# Patient Record
Sex: FEMALE | Race: WHITE | NOT HISPANIC OR LATINO | Employment: UNEMPLOYED | URBAN - METROPOLITAN AREA
[De-identification: names, ages, dates, MRNs, and addresses within clinical notes are randomized per-mention and may not be internally consistent; named-entity substitution may affect disease eponyms.]

---

## 2017-04-11 ENCOUNTER — OFFICE VISIT - HEALTHEAST (OUTPATIENT)
Dept: MIDWIFE SERVICES | Facility: CLINIC | Age: 27
End: 2017-04-11

## 2017-04-11 DIAGNOSIS — Z32.00 POSSIBLE PREGNANCY: ICD-10-CM

## 2017-04-11 DIAGNOSIS — Z32.01 POSITIVE URINE PREGNANCY TEST: ICD-10-CM

## 2017-04-11 ASSESSMENT — MIFFLIN-ST. JEOR: SCORE: 1315.76

## 2017-04-13 ENCOUNTER — COMMUNICATION - HEALTHEAST (OUTPATIENT)
Dept: MIDWIFE SERVICES | Facility: CLINIC | Age: 27
End: 2017-04-13

## 2017-04-14 ENCOUNTER — COMMUNICATION - HEALTHEAST (OUTPATIENT)
Dept: MIDWIFE SERVICES | Facility: CLINIC | Age: 27
End: 2017-04-14

## 2017-04-18 ENCOUNTER — COMMUNICATION - HEALTHEAST (OUTPATIENT)
Dept: MIDWIFE SERVICES | Facility: CLINIC | Age: 27
End: 2017-04-18

## 2017-04-24 ENCOUNTER — COMMUNICATION - HEALTHEAST (OUTPATIENT)
Dept: MIDWIFE SERVICES | Facility: CLINIC | Age: 27
End: 2017-04-24

## 2017-04-24 ENCOUNTER — AMBULATORY - HEALTHEAST (OUTPATIENT)
Dept: OBGYN | Facility: CLINIC | Age: 27
End: 2017-04-24

## 2017-04-26 ENCOUNTER — COMMUNICATION - HEALTHEAST (OUTPATIENT)
Dept: ADMINISTRATIVE | Facility: CLINIC | Age: 27
End: 2017-04-26

## 2017-07-01 ENCOUNTER — OFFICE VISIT - HEALTHEAST (OUTPATIENT)
Dept: FAMILY MEDICINE | Facility: CLINIC | Age: 27
End: 2017-07-01

## 2017-07-01 DIAGNOSIS — G43.909 MIGRAINE HEADACHE: ICD-10-CM

## 2017-07-01 DIAGNOSIS — R19.7 DIARRHEA: ICD-10-CM

## 2017-09-08 ENCOUNTER — RECORDS - HEALTHEAST (OUTPATIENT)
Dept: ADMINISTRATIVE | Facility: OTHER | Age: 27
End: 2017-09-08

## 2017-09-08 ENCOUNTER — AMBULATORY - HEALTHEAST (OUTPATIENT)
Dept: ADMINISTRATIVE | Facility: REHABILITATION | Age: 27
End: 2017-09-08

## 2017-09-08 DIAGNOSIS — G43.909 MIXED MIGRAINE AND MUSCLE CONTRACTION HEADACHE: ICD-10-CM

## 2017-09-08 DIAGNOSIS — G44.209 MIXED MIGRAINE AND MUSCLE CONTRACTION HEADACHE: ICD-10-CM

## 2017-09-19 ENCOUNTER — HOSPITAL ENCOUNTER (OUTPATIENT)
Dept: MRI IMAGING | Facility: CLINIC | Age: 27
Discharge: HOME | End: 2017-09-19

## 2017-09-19 DIAGNOSIS — G43.909 MIXED MIGRAINE AND MUSCLE CONTRACTION HEADACHE: ICD-10-CM

## 2017-09-19 DIAGNOSIS — G44.209 MIXED MIGRAINE AND MUSCLE CONTRACTION HEADACHE: ICD-10-CM

## 2017-09-25 ENCOUNTER — RECORDS - HEALTHEAST (OUTPATIENT)
Dept: ADMINISTRATIVE | Facility: OTHER | Age: 27
End: 2017-09-25

## 2017-09-29 ENCOUNTER — RECORDS - HEALTHEAST (OUTPATIENT)
Dept: ADMINISTRATIVE | Facility: OTHER | Age: 27
End: 2017-09-29

## 2017-10-24 ENCOUNTER — OFFICE VISIT - HEALTHEAST (OUTPATIENT)
Dept: PHYSICAL THERAPY | Facility: REHABILITATION | Age: 27
End: 2017-10-24

## 2017-10-24 DIAGNOSIS — R29.3 POOR POSTURE: ICD-10-CM

## 2017-10-24 DIAGNOSIS — G44.209 TENSION HEADACHE: ICD-10-CM

## 2017-10-24 DIAGNOSIS — M62.81 GENERALIZED MUSCLE WEAKNESS: ICD-10-CM

## 2017-10-24 DIAGNOSIS — M54.12 CERVICAL RADICULITIS: ICD-10-CM

## 2017-10-25 ENCOUNTER — RECORDS - HEALTHEAST (OUTPATIENT)
Dept: ADMINISTRATIVE | Facility: OTHER | Age: 27
End: 2017-10-25

## 2017-11-13 ENCOUNTER — COMMUNICATION - HEALTHEAST (OUTPATIENT)
Dept: FAMILY MEDICINE | Facility: CLINIC | Age: 27
End: 2017-11-13

## 2017-11-17 ENCOUNTER — OFFICE VISIT - HEALTHEAST (OUTPATIENT)
Dept: FAMILY MEDICINE | Facility: CLINIC | Age: 27
End: 2017-11-17

## 2017-11-17 DIAGNOSIS — J02.9 SORE THROAT: ICD-10-CM

## 2017-11-17 DIAGNOSIS — J02.9 PHARYNGITIS: ICD-10-CM

## 2017-12-16 ENCOUNTER — COMMUNICATION - HEALTHEAST (OUTPATIENT)
Dept: FAMILY MEDICINE | Facility: CLINIC | Age: 27
End: 2017-12-16

## 2017-12-16 ENCOUNTER — COMMUNICATION - HEALTHEAST (OUTPATIENT)
Dept: SCHEDULING | Facility: CLINIC | Age: 27
End: 2017-12-16

## 2017-12-16 DIAGNOSIS — G43.909 MIGRAINE HEADACHE: ICD-10-CM

## 2017-12-18 ENCOUNTER — COMMUNICATION - HEALTHEAST (OUTPATIENT)
Dept: FAMILY MEDICINE | Facility: CLINIC | Age: 27
End: 2017-12-18

## 2017-12-18 ENCOUNTER — AMBULATORY - HEALTHEAST (OUTPATIENT)
Dept: FAMILY MEDICINE | Facility: CLINIC | Age: 27
End: 2017-12-18

## 2017-12-18 DIAGNOSIS — R51.9 HEADACHE: ICD-10-CM

## 2017-12-20 ENCOUNTER — COMMUNICATION - HEALTHEAST (OUTPATIENT)
Dept: FAMILY MEDICINE | Facility: CLINIC | Age: 27
End: 2017-12-20

## 2017-12-27 ENCOUNTER — COMMUNICATION - HEALTHEAST (OUTPATIENT)
Dept: HEALTH INFORMATION MANAGEMENT | Facility: CLINIC | Age: 27
End: 2017-12-27

## 2017-12-27 ENCOUNTER — COMMUNICATION - HEALTHEAST (OUTPATIENT)
Dept: TELEHEALTH | Facility: CLINIC | Age: 27
End: 2017-12-27

## 2018-01-04 ENCOUNTER — COMMUNICATION - HEALTHEAST (OUTPATIENT)
Dept: FAMILY MEDICINE | Facility: CLINIC | Age: 28
End: 2018-01-04

## 2018-01-08 ENCOUNTER — COMMUNICATION - HEALTHEAST (OUTPATIENT)
Dept: NURSING | Facility: CLINIC | Age: 28
End: 2018-01-08

## 2018-01-08 ENCOUNTER — OFFICE VISIT - HEALTHEAST (OUTPATIENT)
Dept: FAMILY MEDICINE | Facility: CLINIC | Age: 28
End: 2018-01-08

## 2018-01-08 DIAGNOSIS — G43.909 MIGRAINE: ICD-10-CM

## 2018-01-08 DIAGNOSIS — Z71.89 COUNSELING AND COORDINATION OF CARE: ICD-10-CM

## 2018-01-08 DIAGNOSIS — Z12.4 CERVICAL CANCER SCREENING: ICD-10-CM

## 2018-01-08 DIAGNOSIS — Z00.00 ROUTINE GENERAL MEDICAL EXAMINATION AT A HEALTH CARE FACILITY: ICD-10-CM

## 2018-01-08 ASSESSMENT — MIFFLIN-ST. JEOR: SCORE: 1391.73

## 2018-01-10 ENCOUNTER — RECORDS - HEALTHEAST (OUTPATIENT)
Dept: ADMINISTRATIVE | Facility: OTHER | Age: 28
End: 2018-01-10

## 2018-01-10 LAB
BKR LAB AP ABNORMAL BLEEDING: YES
BKR LAB AP BIRTH CONTROL/HORMONES: NORMAL
BKR LAB AP CERVICAL APPEARANCE: NORMAL
BKR LAB AP GYN ADEQUACY: NORMAL
BKR LAB AP GYN INTERPRETATION: NORMAL
BKR LAB AP HPV REFLEX: NORMAL
BKR LAB AP LMP: NORMAL
BKR LAB AP PATIENT STATUS: NORMAL
BKR LAB AP PREVIOUS ABNORMAL: NORMAL
BKR LAB AP PREVIOUS NORMAL: 2015
HIGH RISK?: NO
PATH REPORT.COMMENTS IMP SPEC: NORMAL
RESULT FLAG (HE HISTORICAL CONVERSION): NORMAL

## 2018-01-13 ENCOUNTER — COMMUNICATION - HEALTHEAST (OUTPATIENT)
Dept: FAMILY MEDICINE | Facility: CLINIC | Age: 28
End: 2018-01-13

## 2018-01-13 DIAGNOSIS — R51.9 HEADACHE: ICD-10-CM

## 2018-01-16 ENCOUNTER — COMMUNICATION - HEALTHEAST (OUTPATIENT)
Dept: FAMILY MEDICINE | Facility: CLINIC | Age: 28
End: 2018-01-16

## 2018-01-16 DIAGNOSIS — R51.9 HEADACHE: ICD-10-CM

## 2018-01-30 ENCOUNTER — COMMUNICATION - HEALTHEAST (OUTPATIENT)
Dept: FAMILY MEDICINE | Facility: CLINIC | Age: 28
End: 2018-01-30

## 2018-01-30 DIAGNOSIS — R51.9 HEADACHE: ICD-10-CM

## 2018-01-31 ENCOUNTER — AMBULATORY - HEALTHEAST (OUTPATIENT)
Dept: FAMILY MEDICINE | Facility: CLINIC | Age: 28
End: 2018-01-31

## 2018-01-31 ENCOUNTER — COMMUNICATION - HEALTHEAST (OUTPATIENT)
Dept: FAMILY MEDICINE | Facility: CLINIC | Age: 28
End: 2018-01-31

## 2018-01-31 ENCOUNTER — AMBULATORY - HEALTHEAST (OUTPATIENT)
Dept: NURSING | Facility: CLINIC | Age: 28
End: 2018-01-31

## 2018-01-31 DIAGNOSIS — R51.9 HEADACHE: ICD-10-CM

## 2018-02-06 ENCOUNTER — AMBULATORY - HEALTHEAST (OUTPATIENT)
Dept: FAMILY MEDICINE | Facility: CLINIC | Age: 28
End: 2018-02-06

## 2018-02-06 ENCOUNTER — COMMUNICATION - HEALTHEAST (OUTPATIENT)
Dept: SCHEDULING | Facility: CLINIC | Age: 28
End: 2018-02-06

## 2018-02-06 DIAGNOSIS — R51.9 HEADACHE: ICD-10-CM

## 2018-02-19 ENCOUNTER — COMMUNICATION - HEALTHEAST (OUTPATIENT)
Dept: FAMILY MEDICINE | Facility: CLINIC | Age: 28
End: 2018-02-19

## 2018-02-19 DIAGNOSIS — R51.9 HEADACHE: ICD-10-CM

## 2018-04-24 ENCOUNTER — COMMUNICATION - HEALTHEAST (OUTPATIENT)
Dept: FAMILY MEDICINE | Facility: CLINIC | Age: 28
End: 2018-04-24

## 2018-05-10 ENCOUNTER — COMMUNICATION - HEALTHEAST (OUTPATIENT)
Dept: FAMILY MEDICINE | Facility: CLINIC | Age: 28
End: 2018-05-10

## 2018-06-29 ENCOUNTER — COMMUNICATION - HEALTHEAST (OUTPATIENT)
Dept: FAMILY MEDICINE | Facility: CLINIC | Age: 28
End: 2018-06-29

## 2018-06-29 ENCOUNTER — OFFICE VISIT - HEALTHEAST (OUTPATIENT)
Dept: FAMILY MEDICINE | Facility: CLINIC | Age: 28
End: 2018-06-29

## 2018-06-29 DIAGNOSIS — H00.015 HORDEOLUM EXTERNUM OF LEFT LOWER EYELID: ICD-10-CM

## 2018-06-29 DIAGNOSIS — G43.909 MIGRAINE HEADACHE: ICD-10-CM

## 2018-07-10 ENCOUNTER — RECORDS - HEALTHEAST (OUTPATIENT)
Dept: ADMINISTRATIVE | Facility: OTHER | Age: 28
End: 2018-07-10

## 2018-07-12 ENCOUNTER — COMMUNICATION - HEALTHEAST (OUTPATIENT)
Dept: FAMILY MEDICINE | Facility: CLINIC | Age: 28
End: 2018-07-12

## 2018-07-17 ENCOUNTER — OFFICE VISIT - HEALTHEAST (OUTPATIENT)
Dept: FAMILY MEDICINE | Facility: CLINIC | Age: 28
End: 2018-07-17

## 2018-07-17 DIAGNOSIS — M79.601 PAIN IN BOTH UPPER EXTREMITIES: ICD-10-CM

## 2018-07-17 DIAGNOSIS — M79.602 PAIN IN BOTH UPPER EXTREMITIES: ICD-10-CM

## 2018-07-17 DIAGNOSIS — N89.8 VAGINAL DISCHARGE: ICD-10-CM

## 2018-07-17 LAB
CLUE CELLS: NORMAL
TRICHOMONAS, WET PREP: NORMAL
YEAST, WET PREP: NORMAL

## 2018-07-26 ENCOUNTER — COMMUNICATION - HEALTHEAST (OUTPATIENT)
Dept: FAMILY MEDICINE | Facility: CLINIC | Age: 28
End: 2018-07-26

## 2018-07-26 DIAGNOSIS — K08.89 TOOTH PAIN: ICD-10-CM

## 2018-08-06 ENCOUNTER — COMMUNICATION - HEALTHEAST (OUTPATIENT)
Dept: FAMILY MEDICINE | Facility: CLINIC | Age: 28
End: 2018-08-06

## 2018-08-06 DIAGNOSIS — K08.89 TOOTH PAIN: ICD-10-CM

## 2018-08-10 ENCOUNTER — COMMUNICATION - HEALTHEAST (OUTPATIENT)
Dept: FAMILY MEDICINE | Facility: CLINIC | Age: 28
End: 2018-08-10

## 2018-09-04 ENCOUNTER — COMMUNICATION - HEALTHEAST (OUTPATIENT)
Dept: FAMILY MEDICINE | Facility: CLINIC | Age: 28
End: 2018-09-04

## 2018-09-05 ENCOUNTER — OFFICE VISIT - HEALTHEAST (OUTPATIENT)
Dept: FAMILY MEDICINE | Facility: CLINIC | Age: 28
End: 2018-09-05

## 2018-09-05 ENCOUNTER — COMMUNICATION - HEALTHEAST (OUTPATIENT)
Dept: FAMILY MEDICINE | Facility: CLINIC | Age: 28
End: 2018-09-05

## 2018-09-05 DIAGNOSIS — R51.9 HEADACHE: ICD-10-CM

## 2018-09-05 DIAGNOSIS — G43.719: ICD-10-CM

## 2018-09-07 ENCOUNTER — COMMUNICATION - HEALTHEAST (OUTPATIENT)
Dept: FAMILY MEDICINE | Facility: CLINIC | Age: 28
End: 2018-09-07

## 2018-09-17 ENCOUNTER — AMBULATORY - HEALTHEAST (OUTPATIENT)
Dept: FAMILY MEDICINE | Facility: CLINIC | Age: 28
End: 2018-09-17

## 2018-10-18 ENCOUNTER — COMMUNICATION - HEALTHEAST (OUTPATIENT)
Dept: ADMINISTRATIVE | Facility: CLINIC | Age: 28
End: 2018-10-18

## 2018-10-31 ENCOUNTER — COMMUNICATION - HEALTHEAST (OUTPATIENT)
Dept: FAMILY MEDICINE | Facility: CLINIC | Age: 28
End: 2018-10-31

## 2018-11-29 ENCOUNTER — COMMUNICATION - HEALTHEAST (OUTPATIENT)
Dept: FAMILY MEDICINE | Facility: CLINIC | Age: 28
End: 2018-11-29

## 2018-12-28 ENCOUNTER — COMMUNICATION - HEALTHEAST (OUTPATIENT)
Dept: FAMILY MEDICINE | Facility: CLINIC | Age: 28
End: 2018-12-28

## 2018-12-28 DIAGNOSIS — F43.10 PTSD (POST-TRAUMATIC STRESS DISORDER): ICD-10-CM

## 2019-01-25 ENCOUNTER — OFFICE VISIT - HEALTHEAST (OUTPATIENT)
Dept: FAMILY MEDICINE | Facility: CLINIC | Age: 29
End: 2019-01-25

## 2019-01-25 DIAGNOSIS — J02.9 PHARYNGITIS, UNSPECIFIED ETIOLOGY: ICD-10-CM

## 2019-01-25 LAB — DEPRECATED S PYO AG THROAT QL EIA: NORMAL

## 2019-01-26 LAB — GROUP A STREP BY PCR: NORMAL

## 2019-02-11 ENCOUNTER — COMMUNICATION - HEALTHEAST (OUTPATIENT)
Dept: FAMILY MEDICINE | Facility: CLINIC | Age: 29
End: 2019-02-11

## 2019-02-11 DIAGNOSIS — R51.9 HEADACHE: ICD-10-CM

## 2019-03-19 ENCOUNTER — COMMUNICATION - HEALTHEAST (OUTPATIENT)
Dept: SCHEDULING | Facility: CLINIC | Age: 29
End: 2019-03-19

## 2019-05-17 ENCOUNTER — COMMUNICATION - HEALTHEAST (OUTPATIENT)
Dept: FAMILY MEDICINE | Facility: CLINIC | Age: 29
End: 2019-05-17

## 2019-05-17 DIAGNOSIS — R51.9 HEADACHE: ICD-10-CM

## 2019-07-18 ENCOUNTER — COMMUNICATION - HEALTHEAST (OUTPATIENT)
Dept: FAMILY MEDICINE | Facility: CLINIC | Age: 29
End: 2019-07-18

## 2019-07-18 DIAGNOSIS — N61.0 MASTITIS: ICD-10-CM

## 2019-07-22 ENCOUNTER — COMMUNICATION - HEALTHEAST (OUTPATIENT)
Dept: SCHEDULING | Facility: CLINIC | Age: 29
End: 2019-07-22

## 2019-08-13 ENCOUNTER — COMMUNICATION - HEALTHEAST (OUTPATIENT)
Dept: SCHEDULING | Facility: CLINIC | Age: 29
End: 2019-08-13

## 2019-08-13 DIAGNOSIS — F41.9 ANXIETY: ICD-10-CM

## 2019-08-27 ENCOUNTER — OFFICE VISIT - HEALTHEAST (OUTPATIENT)
Dept: FAMILY MEDICINE | Facility: CLINIC | Age: 29
End: 2019-08-27

## 2019-08-27 DIAGNOSIS — G43.719 INTRACTABLE CHRONIC MIGRAINE WITHOUT AURA AND WITHOUT STATUS MIGRAINOSUS: ICD-10-CM

## 2019-08-27 DIAGNOSIS — F33.0 MILD EPISODE OF RECURRENT MAJOR DEPRESSIVE DISORDER (H): ICD-10-CM

## 2019-08-27 DIAGNOSIS — F41.9 ANXIETY: ICD-10-CM

## 2019-08-28 ENCOUNTER — COMMUNICATION - HEALTHEAST (OUTPATIENT)
Dept: FAMILY MEDICINE | Facility: CLINIC | Age: 29
End: 2019-08-28

## 2019-09-12 ENCOUNTER — COMMUNICATION - HEALTHEAST (OUTPATIENT)
Dept: FAMILY MEDICINE | Facility: CLINIC | Age: 29
End: 2019-09-12

## 2019-09-12 DIAGNOSIS — R51.9 HEADACHE: ICD-10-CM

## 2019-10-13 ENCOUNTER — COMMUNICATION - HEALTHEAST (OUTPATIENT)
Dept: FAMILY MEDICINE | Facility: CLINIC | Age: 29
End: 2019-10-13

## 2019-10-13 DIAGNOSIS — Z00.00 WELLNESS EXAMINATION: ICD-10-CM

## 2019-11-29 ENCOUNTER — COMMUNICATION - HEALTHEAST (OUTPATIENT)
Dept: FAMILY MEDICINE | Facility: CLINIC | Age: 29
End: 2019-11-29

## 2019-11-29 DIAGNOSIS — Z78.9 USES BIRTH CONTROL: ICD-10-CM

## 2019-12-12 ENCOUNTER — COMMUNICATION - HEALTHEAST (OUTPATIENT)
Dept: FAMILY MEDICINE | Facility: CLINIC | Age: 29
End: 2019-12-12

## 2019-12-12 DIAGNOSIS — J10.1 INFLUENZA B: ICD-10-CM

## 2020-01-07 ENCOUNTER — COMMUNICATION - HEALTHEAST (OUTPATIENT)
Dept: FAMILY MEDICINE | Facility: CLINIC | Age: 30
End: 2020-01-07

## 2020-01-07 DIAGNOSIS — Z00.00 WELLNESS EXAMINATION: ICD-10-CM

## 2020-01-08 RX ORDER — SWAB
SWAB, NON-MEDICATED MISCELLANEOUS
Qty: 90 TABLET | Refills: 0 | Status: SHIPPED | OUTPATIENT
Start: 2020-01-08 | End: 2021-12-13

## 2020-02-06 ENCOUNTER — COMMUNICATION - HEALTHEAST (OUTPATIENT)
Dept: SCHEDULING | Facility: CLINIC | Age: 30
End: 2020-02-06

## 2020-02-06 ENCOUNTER — OFFICE VISIT - HEALTHEAST (OUTPATIENT)
Dept: FAMILY MEDICINE | Facility: CLINIC | Age: 30
End: 2020-02-06

## 2020-02-06 DIAGNOSIS — J02.9 SORETHROAT: ICD-10-CM

## 2020-02-06 DIAGNOSIS — R50.9 FEVER: ICD-10-CM

## 2020-02-06 DIAGNOSIS — J10.1 INFLUENZA A: ICD-10-CM

## 2020-02-06 LAB
DEPRECATED S PYO AG THROAT QL EIA: NORMAL
FLUAV AG SPEC QL IA: ABNORMAL
FLUBV AG SPEC QL IA: ABNORMAL

## 2020-02-07 LAB — GROUP A STREP BY PCR: NORMAL

## 2020-08-07 ENCOUNTER — COMMUNICATION - HEALTHEAST (OUTPATIENT)
Dept: FAMILY MEDICINE | Facility: CLINIC | Age: 30
End: 2020-08-07

## 2020-08-07 DIAGNOSIS — Z78.9 USES BIRTH CONTROL: ICD-10-CM

## 2020-08-07 RX ORDER — LEVONORGESTREL AND ETHINYL ESTRADIOL 0.15-0.03
KIT ORAL
Qty: 84 TABLET | Refills: 2 | Status: SHIPPED | OUTPATIENT
Start: 2020-08-07 | End: 2021-12-13

## 2020-08-17 ENCOUNTER — OFFICE VISIT - HEALTHEAST (OUTPATIENT)
Dept: FAMILY MEDICINE | Facility: CLINIC | Age: 30
End: 2020-08-17

## 2020-08-17 DIAGNOSIS — R51.9 HEADACHE: ICD-10-CM

## 2020-08-17 DIAGNOSIS — F41.9 ANXIETY: ICD-10-CM

## 2020-08-17 DIAGNOSIS — G43.909 MIGRAINE WITHOUT STATUS MIGRAINOSUS, NOT INTRACTABLE, UNSPECIFIED MIGRAINE TYPE: ICD-10-CM

## 2020-08-17 DIAGNOSIS — Z00.00 ROUTINE GENERAL MEDICAL EXAMINATION AT A HEALTH CARE FACILITY: ICD-10-CM

## 2020-08-17 LAB — HCG UR QL: NEGATIVE

## 2020-08-17 ASSESSMENT — ANXIETY QUESTIONNAIRES
7. FEELING AFRAID AS IF SOMETHING AWFUL MIGHT HAPPEN: SEVERAL DAYS
IF YOU CHECKED OFF ANY PROBLEMS ON THIS QUESTIONNAIRE, HOW DIFFICULT HAVE THESE PROBLEMS MADE IT FOR YOU TO DO YOUR WORK, TAKE CARE OF THINGS AT HOME, OR GET ALONG WITH OTHER PEOPLE: SOMEWHAT DIFFICULT
5. BEING SO RESTLESS THAT IT IS HARD TO SIT STILL: SEVERAL DAYS
6. BECOMING EASILY ANNOYED OR IRRITABLE: SEVERAL DAYS
4. TROUBLE RELAXING: SEVERAL DAYS
1. FEELING NERVOUS, ANXIOUS, OR ON EDGE: SEVERAL DAYS
3. WORRYING TOO MUCH ABOUT DIFFERENT THINGS: SEVERAL DAYS
2. NOT BEING ABLE TO STOP OR CONTROL WORRYING: SEVERAL DAYS
GAD7 TOTAL SCORE: 7

## 2020-08-17 ASSESSMENT — MIFFLIN-ST. JEOR: SCORE: 1371.32

## 2020-08-17 ASSESSMENT — PATIENT HEALTH QUESTIONNAIRE - PHQ9: SUM OF ALL RESPONSES TO PHQ QUESTIONS 1-9: 1

## 2020-09-15 ENCOUNTER — COMMUNICATION - HEALTHEAST (OUTPATIENT)
Dept: FAMILY MEDICINE | Facility: CLINIC | Age: 30
End: 2020-09-15

## 2020-09-15 DIAGNOSIS — F41.9 ANXIETY: ICD-10-CM

## 2020-09-16 RX ORDER — ESCITALOPRAM OXALATE 20 MG/1
TABLET ORAL
Qty: 90 TABLET | Refills: 3 | Status: SHIPPED | OUTPATIENT
Start: 2020-09-16 | End: 2021-12-13

## 2020-09-24 ENCOUNTER — COMMUNICATION - HEALTHEAST (OUTPATIENT)
Dept: FAMILY MEDICINE | Facility: CLINIC | Age: 30
End: 2020-09-24

## 2020-09-28 ENCOUNTER — COMMUNICATION - HEALTHEAST (OUTPATIENT)
Dept: FAMILY MEDICINE | Facility: CLINIC | Age: 30
End: 2020-09-28

## 2020-11-18 ENCOUNTER — OFFICE VISIT - HEALTHEAST (OUTPATIENT)
Dept: FAMILY MEDICINE | Facility: CLINIC | Age: 30
End: 2020-11-18

## 2020-11-18 DIAGNOSIS — F33.0 MILD EPISODE OF RECURRENT MAJOR DEPRESSIVE DISORDER (H): ICD-10-CM

## 2020-11-18 DIAGNOSIS — F41.9 ANXIETY: ICD-10-CM

## 2020-11-18 RX ORDER — HYDROXYZINE PAMOATE 50 MG/1
50 CAPSULE ORAL 3 TIMES DAILY PRN
Qty: 30 CAPSULE | Refills: 0 | Status: SHIPPED | OUTPATIENT
Start: 2020-11-18 | End: 2021-12-13

## 2020-11-18 ASSESSMENT — ANXIETY QUESTIONNAIRES
5. BEING SO RESTLESS THAT IT IS HARD TO SIT STILL: NEARLY EVERY DAY
GAD7 TOTAL SCORE: 21
1. FEELING NERVOUS, ANXIOUS, OR ON EDGE: NEARLY EVERY DAY
7. FEELING AFRAID AS IF SOMETHING AWFUL MIGHT HAPPEN: NEARLY EVERY DAY
IF YOU CHECKED OFF ANY PROBLEMS ON THIS QUESTIONNAIRE, HOW DIFFICULT HAVE THESE PROBLEMS MADE IT FOR YOU TO DO YOUR WORK, TAKE CARE OF THINGS AT HOME, OR GET ALONG WITH OTHER PEOPLE: EXTREMELY DIFFICULT
4. TROUBLE RELAXING: NEARLY EVERY DAY
2. NOT BEING ABLE TO STOP OR CONTROL WORRYING: NEARLY EVERY DAY
3. WORRYING TOO MUCH ABOUT DIFFERENT THINGS: NEARLY EVERY DAY
6. BECOMING EASILY ANNOYED OR IRRITABLE: NEARLY EVERY DAY

## 2020-11-18 ASSESSMENT — PATIENT HEALTH QUESTIONNAIRE - PHQ9: SUM OF ALL RESPONSES TO PHQ QUESTIONS 1-9: 17

## 2021-01-07 ENCOUNTER — OFFICE VISIT - HEALTHEAST (OUTPATIENT)
Dept: FAMILY MEDICINE | Facility: CLINIC | Age: 31
End: 2021-01-07

## 2021-01-07 DIAGNOSIS — M79.10 MYALGIA: ICD-10-CM

## 2021-01-07 DIAGNOSIS — G43.909 MIGRAINE WITHOUT STATUS MIGRAINOSUS, NOT INTRACTABLE, UNSPECIFIED MIGRAINE TYPE: ICD-10-CM

## 2021-01-07 DIAGNOSIS — R00.0 TACHYCARDIA: ICD-10-CM

## 2021-01-07 LAB
ALBUMIN SERPL-MCNC: 3.9 G/DL (ref 3.5–5)
ALP SERPL-CCNC: 46 U/L (ref 45–120)
ALT SERPL W P-5'-P-CCNC: 11 U/L (ref 0–45)
ANION GAP SERPL CALCULATED.3IONS-SCNC: 10 MMOL/L (ref 5–18)
AST SERPL W P-5'-P-CCNC: 18 U/L (ref 0–40)
ATRIAL RATE - MUSE: 62 BPM
BILIRUB SERPL-MCNC: 0.5 MG/DL (ref 0–1)
BUN SERPL-MCNC: 17 MG/DL (ref 8–22)
CALCIUM SERPL-MCNC: 8.8 MG/DL (ref 8.5–10.5)
CHLORIDE BLD-SCNC: 106 MMOL/L (ref 98–107)
CO2 SERPL-SCNC: 25 MMOL/L (ref 22–31)
CREAT SERPL-MCNC: 0.83 MG/DL (ref 0.6–1.1)
DIASTOLIC BLOOD PRESSURE - MUSE: NORMAL
ERYTHROCYTE [DISTWIDTH] IN BLOOD BY AUTOMATED COUNT: 11.3 % (ref 11–14.5)
GFR SERPL CREATININE-BSD FRML MDRD: >60 ML/MIN/1.73M2
GLUCOSE BLD-MCNC: 87 MG/DL (ref 70–125)
HCT VFR BLD AUTO: 39.4 % (ref 35–47)
HGB BLD-MCNC: 13.1 G/DL (ref 12–16)
INTERPRETATION ECG - MUSE: NORMAL
MAGNESIUM SERPL-MCNC: 1.9 MG/DL (ref 1.8–2.6)
MCH RBC QN AUTO: 32 PG (ref 27–34)
MCHC RBC AUTO-ENTMCNC: 33.2 G/DL (ref 32–36)
MCV RBC AUTO: 96 FL (ref 80–100)
P AXIS - MUSE: 60 DEGREES
PLATELET # BLD AUTO: 259 THOU/UL (ref 140–440)
PMV BLD AUTO: 7 FL (ref 7–10)
POTASSIUM BLD-SCNC: 4.2 MMOL/L (ref 3.5–5)
PR INTERVAL - MUSE: 184 MS
PROT SERPL-MCNC: 6.9 G/DL (ref 6–8)
QRS DURATION - MUSE: 92 MS
QT - MUSE: 412 MS
QTC - MUSE: 418 MS
R AXIS - MUSE: 41 DEGREES
RBC # BLD AUTO: 4.09 MILL/UL (ref 3.8–5.4)
SODIUM SERPL-SCNC: 141 MMOL/L (ref 136–145)
SYSTOLIC BLOOD PRESSURE - MUSE: NORMAL
T AXIS - MUSE: 33 DEGREES
TSH SERPL DL<=0.005 MIU/L-ACNC: 0.8 UIU/ML (ref 0.3–5)
VENTRICULAR RATE- MUSE: 62 BPM
VIT B12 SERPL-MCNC: 453 PG/ML (ref 213–816)
WBC: 9.4 THOU/UL (ref 4–11)

## 2021-01-07 RX ORDER — BUTALBITAL, ACETAMINOPHEN AND CAFFEINE 50; 325; 40 MG/1; MG/1; MG/1
1-2 TABLET ORAL EVERY 6 HOURS PRN
Qty: 20 TABLET | Refills: 0 | Status: SHIPPED | OUTPATIENT
Start: 2021-01-07 | End: 2021-12-13

## 2021-01-07 RX ORDER — RIZATRIPTAN BENZOATE 5 MG/1
5 TABLET, ORALLY DISINTEGRATING ORAL PRN
Qty: 30 TABLET | Refills: 0 | Status: SHIPPED | OUTPATIENT
Start: 2021-01-07 | End: 2021-12-13

## 2021-01-08 LAB
25(OH)D3 SERPL-MCNC: 26.3 NG/ML (ref 30–80)
25(OH)D3 SERPL-MCNC: 26.3 NG/ML (ref 30–80)
B BURGDOR IGG+IGM SER QL: 0.19 INDEX VALUE

## 2021-02-03 ENCOUNTER — AMBULATORY - HEALTHEAST (OUTPATIENT)
Dept: FAMILY MEDICINE | Facility: CLINIC | Age: 31
End: 2021-02-03

## 2021-02-03 DIAGNOSIS — L70.0 ACNE VULGARIS: ICD-10-CM

## 2021-02-03 RX ORDER — SPIRONOLACTONE 100 MG/1
100 TABLET, FILM COATED ORAL DAILY
Qty: 30 TABLET | Refills: 11 | Status: SHIPPED | OUTPATIENT
Start: 2021-02-03 | End: 2021-12-13

## 2021-02-03 RX ORDER — TRETINOIN 0.25 MG/G
CREAM TOPICAL DAILY
Qty: 45 G | Refills: 0 | Status: SHIPPED | OUTPATIENT
Start: 2021-02-03 | End: 2021-12-13

## 2021-02-16 ENCOUNTER — COMMUNICATION - HEALTHEAST (OUTPATIENT)
Dept: ADMINISTRATIVE | Facility: CLINIC | Age: 31
End: 2021-02-16

## 2021-02-16 DIAGNOSIS — F33.0 MILD EPISODE OF RECURRENT MAJOR DEPRESSIVE DISORDER (H): ICD-10-CM

## 2021-02-16 DIAGNOSIS — F41.9 ANXIETY: ICD-10-CM

## 2021-02-16 RX ORDER — VENLAFAXINE HYDROCHLORIDE 37.5 MG/1
37.5 CAPSULE, EXTENDED RELEASE ORAL DAILY
Qty: 90 CAPSULE | Refills: 0 | Status: SHIPPED | OUTPATIENT
Start: 2021-02-16 | End: 2021-12-13

## 2021-04-22 ENCOUNTER — RECORDS - HEALTHEAST (OUTPATIENT)
Dept: ADMINISTRATIVE | Facility: OTHER | Age: 31
End: 2021-04-22

## 2021-05-26 ASSESSMENT — PATIENT HEALTH QUESTIONNAIRE - PHQ9: SUM OF ALL RESPONSES TO PHQ QUESTIONS 1-9: 17

## 2021-05-27 ASSESSMENT — PATIENT HEALTH QUESTIONNAIRE - PHQ9: SUM OF ALL RESPONSES TO PHQ QUESTIONS 1-9: 1

## 2021-05-28 ASSESSMENT — ANXIETY QUESTIONNAIRES
GAD7 TOTAL SCORE: 7
GAD7 TOTAL SCORE: 21

## 2021-05-28 NOTE — TELEPHONE ENCOUNTER
Controlled Substance Refill Request  Medication:   Requested Prescriptions     Pending Prescriptions Disp Refills     traMADol (ULTRAM) 50 mg tablet [Pharmacy Med Name: TRAMADOL 50MG TABLETS] 30 tablet 0     Sig: TAKE 1 TABLET BY MOUTH AS NEEDED FOR HEADACHE. NO MORE THAN 10 TABLETS PER MONTH     Date Last Fill: 2/13/19  Pharmacy: [facility]    Submit electronically to pharmacy    Controlled Substance Agreement on File:   Encounter-Level CSA Scan Date:    There are no encounter-level csa scan date.         Last office visit: 9/5/2018 Alyssia Scott MD

## 2021-05-28 NOTE — TELEPHONE ENCOUNTER
Last filled per  4-17-19. Last medication check 9-15-18. No current CSA on file. No up coming appointment made.

## 2021-05-30 VITALS — BODY MASS INDEX: 19.93 KG/M2 | WEIGHT: 127 LBS | HEIGHT: 67 IN

## 2021-05-30 NOTE — TELEPHONE ENCOUNTER
Left message to call back for: Lauren-2nd attempt  Information to relay to patient:      rx send for keflex 1 tab three times per day  For 10 dy. Please Follow up in clinic if not better in next 3 days , continue pumping milk or breast feeding      Mulu Gómez MD 7/18/2019 10:12 PM

## 2021-05-30 NOTE — TELEPHONE ENCOUNTER
RN Triage:   Thursday she had a blister on the right breast treated for mastitis on the right breast. On cephalexin 500 mg three times a day. NO fever, just aches and chills which have gotten better since starting the antibiotics. Patient has same amount of pain and redness. Breast is red and feels the blister has turned into a lump that is the size of a marble. Patient wanted an appointment for tomorrow. She was warm transferred to scheduling. Home care suggestions reviewed per RN protocol.     Marquita Faria RN, BSN [facility] Triage Nurse        Reason for Disposition    Taking antibiotic < 72 hours (3 days) and infected wound doesn't look better    Protocols used: WOUND INFECTION-A-OH

## 2021-05-30 NOTE — TELEPHONE ENCOUNTER
rx send for keflex 1 tab three times per day  For 10 dy. Please Follow up in clinic if not better in next 3 days , continue pumping milk or breast feeding     Mulu Gómez MD 7/18/2019 10:12 PM

## 2021-05-30 NOTE — TELEPHONE ENCOUNTER
Patient Returning Call  Reason for call: Patient calling back  Information relayed to patient:  Below message relayed to patient, she reports still in a lot of pain, feels a lump in her breast, caller transferred to [facility] triage nurse.  Patient has additional questions:  Yes  If YES, what are your questions/concerns:    Okay to leave a detailed message?:  Yes

## 2021-05-30 NOTE — TELEPHONE ENCOUNTER
The patient is weaning and the right side is painful.    Rates her pain at an 8 out of 10    The pain started yesterday    Is still breastfeeding but has returned to work and is in the processes of weaning.    Today the breast is pulsating.    The spot is red and tender.    The nipple and the whole breast is tender    It started with a clogged duct. Was hot packing, and pumping and breastfeeding however with the heat and the weaning it has now become inflamed.    Pt is requesting an antibiotic.    She doesn't believe she has a fever.    Baby is fussy and teething    Please advise     Kaylah Goddard RN  [facility] Medication Refill and Triage Nurse  7/18/2019  10:10 AM

## 2021-05-31 VITALS — BODY MASS INDEX: 23.55 KG/M2 | WEIGHT: 148.1 LBS

## 2021-05-31 VITALS — HEIGHT: 66 IN | WEIGHT: 145.5 LBS | BODY MASS INDEX: 23.38 KG/M2

## 2021-05-31 VITALS — BODY MASS INDEX: 27.45 KG/M2 | WEIGHT: 170.1 LBS

## 2021-05-31 NOTE — TELEPHONE ENCOUNTER
Per Dr. Hay - Prior Authorization started for Botox.   [facility] form was completed and faxed 8/28. Copy of form will be sent to scan. Original will be given to Dr. Hay's assistant.   Teodora Urbina LPN

## 2021-05-31 NOTE — TELEPHONE ENCOUNTER
"  Refill of escitalopram      Was referred to triage as she had mentioned to the initial staff that she was experiencing \"palpitaitons\"      Last dose of this was 3 days ago      > \"These are not new\"  - she has a h/o of these - will last 1-2 seconds   > No shortness of breath   > No CP   > No dizziness / lightheadness       A/P:   > Screened per protocol - negative for concerning sx per history reported     > Call back if new pattern or worsening in some way       Will process refill request as per SOP as well        Wicho Triplett, RN   Triage and Medication Refills    Reason for Disposition    Intermittent mild chest pain lasting a few seconds each time    Protocols used: CHEST PAIN-A-OH      "

## 2021-05-31 NOTE — PROGRESS NOTES
ASSESSMENT/PLAN:  Mild episode of recurrent major depressive disorder (H), anxiety  PHQ9=1, GAD7=2  Stable   Refilled  -     escitalopram oxalate (LEXAPRO) 20 MG tablet; Take 1 tablet (20 mg total) by mouth daily.    Intractable chronic migraine without aura and without status migrainosus  Stop daily excedrine migraine  Chronic 10 year history  Location:  Frontal, temporal, occipital  Headache days/month:  25+ days with headaches  Headache duration hrs/day:  12+ hours  Over the past 3 months headaches have become more often and has been taking daily excedrine migraine  Disability due to headaches: often missed /canceled plans because of headaches.  She has gone to the emergency department numerous times because of her headaches.  Medications previously tried: verapamil, Amitriptyline, topiramate, propanolol, gabapentin as preventative treatment without success.  She has tried Zofran, sumatriptan, Rizatriptan, ibuprofen, acetaminophen, tramadol, Fioricet as acute abortive treatments.  She has also tried [facility] and has not noted any difference     Plan:  start prior authorization for BOTOX.  She had an MRI of the brain that was done in 2017 which showed Chiari I malformation with tonsils projecting 7.6 mm over the foramen magnum, as opposed to an MRI from 2015 which showed the Chiari malformation with tonsils 5 mm before the level of the foramen magnum.  She will follow-up with [facility].  I strongly support the use of botulinum toxin injection for the convention of chronic migraine for this patient.     CHIEF COMPLAINT:  Chief Complaint   Patient presents with     Medication Management     citalopram        HISTORY OF PRESENT ILLNESS:  Lauren is a 29 y.o. female presenting to the clinic today for medication management. The patient report that she recently moved houses 4 days ago and acknowledge that she feels a little bit sad about the move, but overall feels  okay. The patient acknowledge that she has not been able to see her psychiatrist and needs her citalopram medication renewed. She confirms that she has ongoing constant migraines.  She denies to take Escitalopram oxalate, 10 mg, and tramadol 50 mg. The patient states that she is no longer on probabtion    Chronic 10 year history  Location:  Frontal, temporal, occipital  Headache days/month:  25+ days with headaches  Headache duration hrs/day:  12+ hours  Over the past 3 months headaches have become more often  Disability due to headaches: often missed /canceled plans because of headaches.  She has gone to the emergency department numerous times because of her headaches.  Medications previously tried: verapamil, Amitriptyline, topiramate, propanolol, gabapentin as preventative treatment without success.  She has tried Zofran, sumatriptan, Rizatriptan, ibuprofen, acetaminophen, tramadol, Fioricet as acute abortive treatments.  She has also tried physical therapy [facility] and has not noted any difference     [facility].  There was a note for review.  It was mentioned that the patient had an MRI of the brain that showed Chiari I malformation with tonsils projecting at 7.6 mm over the foramen magnum.  This was also compared with an MRI from 2015 which showed Chiari malformation with tonsils 5 mm before the level of the foramen magnum.    REVIEW OF SYSTEMS:   Positive: migraines.  All other systems are negative.    PFSH:  Substance abuse  Alcoholic     TOBACCO USE:  Social History     Tobacco Use   Smoking Status Former Smoker     Years: 5.00     Types: Cigarettes     Last attempt to quit: 2012     Years since quittin.6   Smokeless Tobacco Never Used   Tobacco Comment    e-cig       VITALS:  Vitals:    19 1456   BP: 106/70   Pulse: 64   Resp: 16   Weight: 124 lb 8 oz (56.5 kg)     Wt Readings from Last 3 Encounters:   19 124 lb  8 oz (56.5 kg)   01/25/19 120 lb (54.4 kg)   09/05/18 123 lb 9.6 oz (56.1 kg)       PHYSICAL EXAM:  Constitutional: Patient is oriented to person, place, and time. Patient appears well-developed and well-nourished. No distress.   Head: Normocephalic and atraumatic.   Right Ear: External ear normal.   Left Ear: External ear normal.   Nose: Nose normal.   Eyes: Conjunctivae and EOM are normal. Right eye exhibits no discharge. Left eye exhibits no discharge. No scleral icterus.   Neurological: Patient is alert and oriented to person, place, and time. No cranial nerve deficit. Coordination normal.   Skin: No rash noted. Patient is not diaphoretic. No erythema. No pallor.  The patient has good eye contact.  No psychomotor retardation or stereotypical behaviors.  Speech was regular rate, regular rhythm, adequate responses.  Mood was stable and affect was congruent mood.  No suicidal or homicidal intent.  No hallucination.     Results for orders placed or performed in visit on 01/25/19   Rapid Strep A Screen-Throat   Result Value Ref Range    Rapid Strep A Antigen No Group A Strep detected, presumptive negative No Group A Strep detected, presumptive negative   Group A Strep, RNA Direct Detection, Throat   Result Value Ref Range    Group A Strep by PCR No Group A Strep rRNA detected No Group A Strep rRNA detected       QUALITY MEASURES:      ADDITIONAL HISTORY SUMMARIZED (2): None.  DECISION TO OBTAIN EXTRA INFORMATION (1): None.   RADIOLOGY TESTS (1): None.  LABS (1): None.  MEDICINE TESTS (1): None.  INDEPENDENT REVIEW (2 each): None.     Total data points: 0    The visit lasted a total of 16 minutes face to face with the patient. Over 50% of the time was spent counseling and educating the patient about medication renewal.    IAngely, am scribing for and in the presence of, Dr. Hay.    I, Dr. Hay, personally performed the services described in this documentation, as scribed by Angely Salazar in my presence, and  it is both accurate and complete.    MEDICATIONS:  Current Outpatient Medications   Medication Sig Dispense Refill     escitalopram oxalate (LEXAPRO) 20 MG tablet Take 1 tablet (20 mg total) by mouth daily. 90 tablet 3     levonorgestrel-ethinyl estradiol (LEVORA-28) 0.15-0.03 mg per tablet Take 1 tablet by mouth daily. 84 tablet 3     prenat.vits,gabriel,min-iron-folic (PRENATAL VITAMIN) Tab Take 1 tablet by mouth daily. 90 each 3     butalbital-acetaminophen-caffeine (FIORICET, ESGIC) -40 mg per tablet Take 1-2 tablets by mouth every 6 (six) hours as needed for headaches. 20 tablet 0     cholecalciferol, vitamin D3, 1,000 unit capsule        SUMAtriptan (IMITREX) 50 MG tablet Take 1 tablet (50 mg total) by mouth once as needed for migraine. Max 200mg/24 hr 30 tablet 0     traMADol (ULTRAM) 50 mg tablet TAKE 1 TABLET BY MOUTH AS NEEDED FOR HEADACHE. NO MORE THAN 10 TABLETS PER MONTH 30 tablet 0     No current facility-administered medications for this visit.

## 2021-05-31 NOTE — TELEPHONE ENCOUNTER
RN cannot approve Refill Request    RN can NOT refill this medication historical medication requested.       Last office visit: Visit date not found Last Physical: Visit date not found Last MTM visit: 8/23/2016 Bonny Oliveira, PharmD Last visit same specialty: Visit date not found.  Next visit within 3 mo: Visit date not found  Next physical within 3 mo: Visit date not found      Ross Triplett, [facility] Connection Triage/Med Refill 8/13/2019    Requested Prescriptions   Pending Prescriptions Disp Refills     escitalopram oxalate (LEXAPRO) 10 MG tablet  0     Sig: Take 1 tablet (10 mg total) by mouth daily.       There is no refill protocol information for this order

## 2021-06-01 VITALS — BODY MASS INDEX: 20.18 KG/M2 | WEIGHT: 125 LBS

## 2021-06-01 VITALS — WEIGHT: 123.13 LBS | BODY MASS INDEX: 19.87 KG/M2

## 2021-06-01 NOTE — TELEPHONE ENCOUNTER
I contacted [employer] to check on status of Prior Auth. Per [employer]'s the PA was denied and a letter was faxed to us on 8/28. Letter will be re-faxed to the  fax machine.   Teodora Urbina, [facility]

## 2021-06-01 NOTE — TELEPHONE ENCOUNTER
RN cannot approve Refill Request    RN can NOT refill this medication PCP to clarify quantity and instructions. Last office visit: 8/27/2019 Alyssia Scott MD Last Physical: 1/8/2018 Last MTM visit: Visit date not found Last visit same specialty: 8/27/2019 Alyssia Scott MD.  Next visit within 3 mo: Visit date not found  Next physical within 3 mo: Visit date not found      Sabrina Ovalle Connection Triage/Med Refill 9/12/2019    Requested Prescriptions   Pending Prescriptions Disp Refills     SUMAtriptan (IMITREX) 50 MG tablet [Pharmacy Med Name: SUMATRIPTAN 50MG TABLETS] 30 tablet 0     Sig: TAKE 1 TABLET(50 MG) BY MOUTH 1 TIME AS NEEDED FOR MIGRAINE.  MG/ 24 HOUR       Triptans Refill Protocol Passed - 9/12/2019 12:15 PM        Passed - PCP or prescribing provider visit in past 12 months       Last office visit with prescriber/PCP: 8/27/2019 Alyssia Scott MD OR same dept: 8/27/2019 Alyssia Scott MD OR same specialty: 8/27/2019 Alyssia Scott MD  Last physical: 1/8/2018 Last MTM visit: Visit date not found   Next visit within 3 mo: Visit date not found  Next physical within 3 mo: Visit date not found  Prescriber OR PCP: Alyssia Beard MD  Last diagnosis associated with med order: 1. Headache  - SUMAtriptan (IMITREX) 50 MG tablet [Pharmacy Med Name: SUMATRIPTAN 50MG TABLETS]; TAKE 1 TABLET(50 MG) BY MOUTH 1 TIME AS NEEDED FOR MIGRAINE.  MG/ 24 HOUR  Dispense: 30 tablet; Refill: 0    If protocol passes may refill for 12 months if within 3 months of last provider visit (or a total of 15 months).

## 2021-06-02 VITALS — WEIGHT: 123.6 LBS | BODY MASS INDEX: 19.95 KG/M2

## 2021-06-02 VITALS — WEIGHT: 120 LBS | BODY MASS INDEX: 19.37 KG/M2

## 2021-06-02 NOTE — TELEPHONE ENCOUNTER
RN cannot approve Refill Request    RN can NOT refill this medication med is not covered by policy/route to provider. Last office visit: 8/27/2019 Alyssia Scott MD Last Physical: 1/8/2018 Last MTM visit: Visit date not found Last visit same specialty: 8/27/2019 Alyssia Scott MD.  Next visit within 3 mo: Visit date not found  Next physical within 3 mo: Visit date not found      Nai Boyce, [facility] Triage/Med Refill 10/13/2019    Requested Prescriptions   Pending Prescriptions Disp Refills     PNV cmb#95-ferrous fumarate-FA (PRENATAL VITAMIN WITH MINERALS) 28 mg iron- 800 mcg Tab [Pharmacy Med Name: PRENAVITE TABLETS] 90 tablet 0     Sig: TAKE 1 TABLET BY MOUTH DAILY       There is no refill protocol information for this order

## 2021-06-03 VITALS — WEIGHT: 124.5 LBS | BODY MASS INDEX: 20.09 KG/M2

## 2021-06-04 VITALS
TEMPERATURE: 99.3 F | HEART RATE: 80 BPM | DIASTOLIC BLOOD PRESSURE: 80 MMHG | SYSTOLIC BLOOD PRESSURE: 110 MMHG | BODY MASS INDEX: 20.39 KG/M2 | WEIGHT: 126.31 LBS | OXYGEN SATURATION: 97 %

## 2021-06-04 VITALS
OXYGEN SATURATION: 99 % | SYSTOLIC BLOOD PRESSURE: 118 MMHG | HEIGHT: 66 IN | HEART RATE: 80 BPM | WEIGHT: 141 LBS | DIASTOLIC BLOOD PRESSURE: 62 MMHG | BODY MASS INDEX: 22.66 KG/M2

## 2021-06-04 NOTE — TELEPHONE ENCOUNTER
Describe your symptoms:   Sore throat  Fever  Body Aches  Any pain: yes  New/Ongoing: New  How long have you been having symptoms: 2  day(s)  Have you been seen for this:  No.  Appointment offered? Spouse calling  Triage offered?: patient declined  Home remedies tried: Dayquil, IBU  Pharmacy Name and Location: [id number] [place] [id number] [address] [facility]  Okay to leave a detailed message? Yes     Daughter Marija Santiago  2014 was seen 12/10/19 tested POSITIVE for Inf B and is experiencing the same sx's  Requesting Tamiflu therapy.  Please send Rx to pharmacy and notify patient to the outcome .

## 2021-06-05 VITALS
TEMPERATURE: 98.3 F | SYSTOLIC BLOOD PRESSURE: 120 MMHG | HEART RATE: 82 BPM | DIASTOLIC BLOOD PRESSURE: 80 MMHG | BODY MASS INDEX: 24.05 KG/M2 | OXYGEN SATURATION: 100 % | WEIGHT: 149 LBS

## 2021-06-05 NOTE — TELEPHONE ENCOUNTER
RN cannot approve Refill Request    RN can NOT refill this medication med is not covered by policy/route to provider. Last office visit: 8/27/2019 Alyssia Scott MD Last Physical: 1/8/2018 Last MTM visit: Visit date not found Last visit same specialty: 8/27/2019 Alyssia Scott MD.  Next visit within 3 mo: Visit date not found  Next physical within 3 mo: Visit date not found      Abril Bentley, [facility] Triage/Med Refill 1/8/2020    Requested Prescriptions   Pending Prescriptions Disp Refills     PNV cmb#95-ferrous fumarate-FA (PRENATAL VITAMIN WITH MINERALS) 28 mg iron- 800 mcg Tab [Pharmacy Med Name: PRENAVITE TABLETS] 90 tablet 0     Sig: TAKE 1 TABLET BY MOUTH ONCE DAILY       There is no refill protocol information for this order

## 2021-06-05 NOTE — TELEPHONE ENCOUNTER
Sore throat, body hurts, had a fever but has not checked it.    No vomiting or diarrhea.    Would liek to be seen today in clinic.    Transferred to scheduling for an appointment.    Abril Bentley RN [facility] Triage Nurse Advisor    Reason for Disposition    SEVERE sore throat pain    Protocols used: SORE THROAT-A-OH

## 2021-06-05 NOTE — PROGRESS NOTES
ASSESSMENT/PLAN:    Fever, Sorethroat, Influenza A  Pleasant 29 y.o.  female presented with fever and sore throat and found to have influenza A.  Rapid strep was negative.  The patient may continue with OTC symptomatic treatment.  Rest, hydration, nutritional support, and time were counseled.  Follow up if the patient is not better in 1-2 weeks.  The patient verbalized understanding and agreed with the plan.  -     Rapid Strep A Screen-Throat  -     Influenza A/B Rapid Test- Nasal Swab  -     Group A Strep, RNA Direct Detection, Throat  -     oseltamivir (TAMIFLU) 75 MG capsule; Take 1 capsule (75 mg total) by mouth 2 (two) times a day for 5 days.      Orders Placed This Encounter   Procedures     Rapid Strep A Screen-Throat     Influenza A/B Rapid Test- Nasal Swab     Group A Strep, RNA Direct Detection, Throat     CHIEF COMPLAINT:  Chief Complaint   Patient presents with     Sore Throat     x 3 days     HISTORY OF PRESENT ILLNESS:  Lauren is a 29 y.o. female presenting to the clinic today for a sore throat and chills. She is accompanied by her son, Clive. She has had a sore throat for 3 days. She has had chills, cough, myalgia and headaches. She denies any vomiting or diarrhea, but has had some nausea. Her son has also been sick.    REVIEW OF SYSTEMS:   Constitutional: Negative.   HENT: Negative.   Eyes: Negative.   Respiratory: Negative.   Cardiovascular: Negative.   Gastrointestinal: Negative.   Endocrine: Negative.   Genitourinary: Negative.   Musculoskeletal: Negative.   Skin: Negative.   Allergic/Immunologic: Negative.   Neurological: Negative.   Hematological: Negative.   Psychiatric/Behavioral: Negative.   All other systems are negative.    TOBACCO USE:  Social History     Tobacco Use   Smoking Status Former Smoker     Years: 5.00     Types: Cigarettes     Last attempt to quit: 2012     Years since quittin.1   Smokeless Tobacco Never Used   Tobacco Comment    e-cig       VITALS:  Vitals:     02/06/20 0829   BP: 110/80   Pulse: 80   Temp: 99.3  F (37.4  C)   TempSrc: Oral   SpO2: 97%   Weight: 126 lb 5 oz (57.3 kg)     Wt Readings from Last 3 Encounters:   02/06/20 126 lb 5 oz (57.3 kg)   08/27/19 124 lb 8 oz (56.5 kg)   01/25/19 120 lb (54.4 kg)       PHYSICAL EXAM:  Constitutional: Patient is oriented to person, place, and time. Patient appears well-developed and well-nourished. No distress.   Head: Normocephalic and atraumatic.   Right Ear: External ear normal.   Left Ear: External ear normal.   Nose: Nose normal.   Mouth/Throat: Oropharynx is clear and moist. No oropharyngeal exudate. Peritonsillar erythema.   Eyes: Conjunctivae and EOM are normal. Pupils are equal, round, and reactive to light. Right eye exhibits no discharge. Left eye exhibits no discharge. No scleral icterus.   Neck: Neck supple. No JVD present. No tracheal deviation present. No thyromegaly present.   Cardiovascular: Normal rate, regular rhythm, normal heart sounds and intact distal pulses. No murmur heard.   Pulmonary/Chest: Effort normal and breath sounds normal. No stridor. No respiratory distress. Patient has no wheezes, no rales, exhibits no tenderness.   Abdominal: Soft. Bowel sounds are normal. Patient exhibits no distension and no mass. There is no tenderness. There is no rebound and no guarding.   Lymphadenopathy:  Patient has no cervical adenopathy.   Neurological: Patient is alert and oriented to person, place, and time. Patient has normal reflexes. No cranial nerve deficit. Coordination normal.   Skin: Skin is warm and dry. No rash noted. Patient is not diaphoretic. No erythema. No pallor.    Results for orders placed or performed in visit on 02/06/20   Rapid Strep A Screen-Throat   Result Value Ref Range    Rapid Strep A Antigen No Group A Strep detected, presumptive negative No Group A Strep detected, presumptive negative   Influenza A/B Rapid Test- Nasal Swab   Result Value Ref Range    Influenza  A, Rapid Antigen  Influenza A antigen detected (!) No Influenza A antigen detected    Influenza B, Rapid Antigen No Influenza B antigen detected No Influenza B antigen detected     ADDITIONAL HISTORY SUMMARIZED (2): Reviewed RN triage from 02/06/2020 for sore throat and myalgia.   DECISION TO OBTAIN EXTRA INFORMATION (1): None.   RADIOLOGY TESTS (1): None.  LABS (1): None.  MEDICINE TESTS (1): Performed Rapid strep test today. Performed influenza A/B swab today.   INDEPENDENT REVIEW (2 each): None.     IGenevieve, am scribing for and in the presence of, Dr. Hay.    I, Dr. Hay, personally performed the services described in this documentation, as scribed by Genevieve Swann in my presence, and it is both accurate and complete.    MEDICATIONS:  Current Outpatient Medications   Medication Sig Dispense Refill     butalbital-acetaminophen-caffeine (FIORICET, ESGIC) -40 mg per tablet Take 1-2 tablets by mouth every 6 (six) hours as needed for headaches. 20 tablet 0     cholecalciferol, vitamin D3, 1,000 unit capsule        escitalopram oxalate (LEXAPRO) 20 MG tablet Take 1 tablet (20 mg total) by mouth daily. 90 tablet 3     KURVELO, 28, 0.15-0.03 mg per tablet TAKE 1 TABLET BY MOUTH DAILY 84 tablet 2     oseltamivir (TAMIFLU) 75 MG capsule Take 1 capsule (75 mg total) by mouth 2 (two) times a day for 5 days. 10 capsule 0     PNV cmb#95-ferrous fumarate-FA (PRENATAL VITAMIN WITH MINERALS) 28 mg iron- 800 mcg Tab TAKE 1 TABLET BY MOUTH ONCE DAILY 90 tablet 0     SUMAtriptan (IMITREX) 50 MG tablet TAKE 1 TABLET(50 MG) BY MOUTH 1 TIME AS NEEDED FOR MIGRAINE.  MG/ 24 HOUR 30 tablet 0     traMADol (ULTRAM) 50 mg tablet TAKE 1 TABLET BY MOUTH AS NEEDED FOR HEADACHE. NO MORE THAN 10 TABLETS PER MONTH 30 tablet 0     No current facility-administered medications for this visit.        Total data points:3
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
## 2021-06-10 NOTE — PROGRESS NOTES
Documentation: Patient was seen for confirmation of pregnancy visit on 4/11/17. Patient case reviewed with CNM group. Due to lack of patient follow-up and high-risk medical and medication HX, patient is deemed inappropriate for CNM care. Unable to reach patient via phone or Mychart to inform her of this. She will need to seek prenatal care elsewhere if she does call back.

## 2021-06-11 NOTE — PROGRESS NOTES
Subjective:      Patient ID: Lauren Santiago is a 27 y.o. female.    Chief Complaint:    HPI  Patient comes in for evaluation of diarrhea that has been ongoing for the last several weeks.  She was seen in the [place] emergency department and diagnosed with a viral infection.  She took a single dose of Imodium a couple weeks ago and none since then.  She has about 4-5 mucousy stools per day.  She has no abdominal pain or discomfort at this time.  When this first started she was having a little bit of cramping and that has gone away.    She is currently taking Seroquel and she said that her psychiatrist had decreased her dose from 300 mg at bedtime to 150 mg at bedtime.  She is in a retirement house mandated by the judicial system and will need a physician order confirming the reduced dose.    She also has a history of migraine headaches.  She had a migraine while she was in nursing home and was taken to [facility] of the [facility].  The doctor had ordered Reglan and Benadryl for the headaches in addition to Tylenol.  Patient does not have that prescription with her and would like this refilled.    Patient is currently pregnant    Past Medical History:   Diagnosis Date     Anxiety 6/16/2015     GERD (gastroesophageal reflux disease) 5/23/2015     Headache      Migraine headache 6/23/2015     Mood disorder 6/16/2015     Opiate abuse, episodic     Stopped and detoxed.     Panic disorder 6/23/2015     PTSD (post-traumatic stress disorder) 2009       No past surgical history on file.    Family History   Problem Relation Age of Onset     Ovarian cancer Mother      In remission as of 2017     Mental illness Father      Depression Father      Hypertension Father      Drug abuse Brother      Depression Brother      Heart attack Paternal Grandmother      Hypertension Paternal Grandmother        Social History   Substance Use Topics     Smoking status: Former Smoker     Smokeless tobacco: Current User      Comment: e-cig     Alcohol use No        Review of Systems    Objective:     /52  Pulse 87  Temp 97.9  F (36.6  C) (Oral)   Resp 12  Wt 148 lb 1.6 oz (67.2 kg)  LMP 02/28/2017  SpO2 98%  BMI 23.55 kg/m2    Physical Exam   Constitutional: No distress.   She looks well-hydrated.  She moves easily from the chair to the exam table.   Cardiovascular: Normal rate.    Pulmonary/Chest: Effort normal.   Abdominal: Soft. Bowel sounds are normal. She exhibits no distension. There is no tenderness. There is no guarding.   Neurological: She is alert.       Assessment and Plan:     Procedures    The primary encounter diagnosis was Migraine headache. A diagnosis of Diarrhea was also pertinent to this visit.    Diarrhea for the past several weeks.  Patient does not appear to be in any pain.  She is well-appearing and well-hydrated.  I would not recommend any antidiarrheal medications at this time.  She was told that it was likely viral, but this is a long time for a viral type illness.  She has had problems with drug abuse and I am unsure if she had been on opiates for a while and she could be withdrawing from opiates causing the diarrhea.  She has not been on antibiotics and I do not think this is due to C. difficile.  I will obtain a stool culture and based on the results to determine if we need to treat.    History of migraine headaches: She currently is not having a headache at this time.  Since she is pregnant she cannot use triptan's.  Reglan is contraindicated when using Seroquel as increased risk of tardive dyskinesia.  I did give her prescription for Benadryl that she can use with Tylenol to see if this may help with her headaches.  I also encouraged her to stay well-hydrated.    I adjusted her dose of Seroquel in the chart to reflect the 150 mg that she is taken at bedtime.      Patient Instructions     Diarrhea for the past few weeks. Will obtain a stool culture.    Migraine headache: rest and stay hydrated. May try Benadryl and Tylenol for  the headache. Unable to use Reglan since you are on Seroquel    You mentioned that your dose of Seroquel had been reduced to 150 mg at bedtime and I adjusted this on you medication list.

## 2021-06-11 NOTE — TELEPHONE ENCOUNTER
Refill Approved    Rx renewed per Medication Renewal Policy. Medication was last renewed on 8/27/09.    Loretta Mejía, [facility] Triage/Med Refill 9/16/2020     Requested Prescriptions   Pending Prescriptions Disp Refills     escitalopram oxalate (LEXAPRO) 20 MG tablet [Pharmacy Med Name: ESCITALOPRAM 20MG TABLETS] 90 tablet 3     Sig: TAKE ONE TABLET BY MOUTH EVERY DAY       [facility] Refill Protocol  Passed - 9/15/2020  3:33 AM        Passed - PCP or prescribing provider visit in last year     Last office visit with prescriber/PCP: 2/6/2020 Alyssia Scott MD OR same dept: 2/6/2020 Alyssia Scott MD OR same specialty: 2/6/2020 Alyssia Scott MD  Last physical: 8/17/2020 Last MTM visit: Visit date not found   Next visit within 3 mo: Visit date not found  Next physical within 3 mo: Visit date not found  Prescriber OR PCP: Alyssia Beard MD  Last diagnosis associated with med order: 1. Anxiety  - escitalopram oxalate (LEXAPRO) 20 MG tablet [Pharmacy Med Name: ESCITALOPRAM 20MG TABLETS]; TAKE ONE TABLET BY MOUTH EVERY DAY  Dispense: 90 tablet; Refill: 3    If protocol passes may refill for 12 months if within 3 months of last provider visit (or a total of 15 months).

## 2021-06-11 NOTE — TELEPHONE ENCOUNTER
We do not do paternity test here in clinic. Charlette will check with the director to see where she can have this done. The message was also given to Niko.

## 2021-06-13 NOTE — PROGRESS NOTES
[facility]   Cervical Thoracic Initial Evaluation    Patient Name: Lauren Santiago  Date of evaluation: 10/24/2017  Referral Diagnosis: Mixed migraine and muscle contraction headache  Referring provider: Mo Terry MD  Visit Diagnosis:     ICD-10-CM    1. Tension headache G44.209    2. Cervical radiculitis M54.12    3. Poor posture R29.3    4. Generalized muscle weakness M62.81        Assessment:      Pt. is appropriate for skilled PT intervention as outlined in the Plan of Care (POC).  Pt. is a good candidate for skilled PT services to improve pain levels and function.    Goals:  Pt. will demonstrate/verbalize independence in self-management of condition in : 6 weeks  Pt. will be independent with home exercise program in : 6 weeks  Pt. will report decreased intensity, frequency of : in 6 weeks  Pt. will have improved quality of sleep: waking less times/night;in 6 weeks  Patient Turn Head: for driving;with less pain;in 6 weeks  No Data Recorded    Patient's expectations/goals are realistic.    Barriers to Learning or Achieving Goals:  Chronicity of the problem.  Co-morbidities or other medical factors.  pregnant and recent methamphetamine user       Plan / Patient Instructions:        Plan of Care:   Authorization / Certification Start Date: 10/24/17  Authorization / Certification End Date: 01/20/17  Authorization / Certification Number of Visits: 12  Communication with: Referral Source  Patient Related Instruction: Nature of Condition;Treatment plan and rationale;Self Care instruction;Basis of treatment;Body mechanics;Posture;Expected outcome  Times per Week: 1-2x/week  Number of Weeks: 6  Number of Visits: 12  Precautions / Restrictions : pregnant  Therapeutic Exercise: ROM;Stretching;Strengthening  Neuromuscular Reeducation: kinesio tape;posture;core  Manual Therapy: soft tissue mobilization    Plan for next visit: Plan to review exercises, add scapular retraction. Continue with MT for  suboccipitals and manual traction.  Continue with KT if helpful.     Subjective:         Social information:   Living Situation:lives with others    Occupation:homemaker   Work Status:NA   Equipment Available: None    History of Present Illness:    Lauren is a 27 y.o. female who presents to therapy today with complaints of frequent Migraine HA about 4x/week and neck pain right more frequent than left with radicular symptoms into right arm and hand. Date of onset/duration of symptoms is from many years ago with symptoms worsening over the past year. Onset was gradual. Symptoms are constant and not improving. She reports history of similar symptoms and reports her dad said she had headaches as young as a child and Migraines started when she was a teenager. She describes their previous level of function as limited with all activities.     Had visit to ER in July and August of this year for Migraine and dental pain.  MR of head performed most recently 17 for Migraines.  Has Chiari I formation noted and also noted in earlier MR/xrays of C-spine.    Pt. Is pregnant with due date of Dec. 12th, 2017 and has one daughter, age 3.5 at home.  Is  and stays home with daughter.  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  Reports Migraines can be triggered with coughing or laughing.  Lifting daughter or heavy activity can trigger increased neck tension and headache also.  Reports HA always there and reports neck pain constant also.  Was told she might have Carpal Tunnel of right hand.    Pain Ratin  Pain rating at best: 4  Pain rating at worst: 10  Pain description: numbness, pain, sharp, soreness and Migraine    Functional limitations are described as occurring with:   dependent care  lifting  personal cares donning bra, combing hair and washing hair  reaching overhead and behind back  performing routine daily activities  sitting greater than 15 minutes  sleeping    Patient reports benefit from:  rest  ,  cold         Objective:      Note: Items left blank indicates the item was not performed or not indicated at the time of the evaluation.    Patient Outcome Measures :    Neck Disability Score in %: 60   Scores range from 0-100%, where a score of 0% represents minimal pain and maximal function. The minmal clinically important difference is a score reduction of 10%.    Cervical Thoracic Examination  1. Tension headache     2. Cervical radiculitis     3. Poor posture     4. Generalized muscle weakness       Involved side: Bilateral and right greater than left  Posture Observation:      General sitting posture is  poor.  General standing posture is fair.  Shoulder/Thoracic complex: Moderate bilateral scapular protraction     Cervical ROM:    Date: 10/24/17     *Indicate scale AROM AROM AROM   Cervical Flexion End range pain     Cervical Extension End range pain      Right Left Right Left Right Left   Cervical Sidebending Min loss Min loss       Cervical Rotation Min loss Mod loss       Cervical Protraction WNL     Cervical Retraction End range pain     Thoracic Flexion      Thoracic Extension      Thoracic Sidebending         Thoracic Rotation           Strength     Date: 10/24/17     Cervical Myotomes/5 Right Left Right Left Right Left   Cervical Flexion (C1-2) 4 4       Cervical Sidebending (C3) 4 4       Shoulder Elevation (C4) 4 4       Shoulder Abduction (C5) 4 4       Elbow Flexion (C6) 4 4       Elbow Extension (C7) 4 4       Wrist Flexion (C7) 4 4       Wrist Extension (C6) 4 4       Thumb abduction (C8) 4 4       Finger Abduction (T1) 4 4         Sensation   intact      Reflex Testing  Cervical Dermatomes Right Left UE Reflexes Right Left   Back of the Head (C2)   Biceps (C5-6)     Supraclavicular Fossa (C3)   Brachioradialis (C5-6)     AC Joint (C4)   Triceps (C7-8)     Lateral Biceps (C5)   Tanner s test     Palmar Thumb (C6)   LE Reflexes     Palmar 3rd Finger (C7)   Patellar (L3-4)     Palmar 5th Finger  (C8)   Achilles (S1-2)     Ulnar Forearm (T1)   Babinski Response         Flexibility:decreased flexibility of bilateral shoulders  AROM for shoulder:  Flexion Right 140 degrees, Left 144 degrees  Abduction right 160 degrees, left 175 degrees  Extension right 54 degrees, left 57 degrees  ER right 55 degrees, left 70 degrees  IR right 60 degrees, left 65 degrees    Palpation: tender at bilateral suboccipitals, bilateral scalenes, right upper trapezius and levator scapula    Passive Mobility-Joint Integrity: Not tested.    Cervical Special Tests     Cervical Special Tests Right Left UE Nerve Mobility Right Left   Cervical compression   Median nerve     Cervical distraction   Ulnar nerve     Spurling s test   Radial nerve     Shoulder abduction sign   Thoracic outlet     Deep neck flexor endurance test   Kailey     Upper cervical rotation   Adson s     Sharper-Paul   Cervical rotation lateral flexion     Alar ligament test   Other:     Other:   Other:       UE Screen: see above    Treatment Today     TREATMENT MINUTES COMMENTS   Evaluation 30 Neck/right shoulder   Self-care/ Home management 10 Handouts given and reviewed regarding posture, body mechanics, icing and kinesiotape use   Manual therapy 5 Manual traction for neck supine 90/90 and gentle MT for suboccipital mm   Neuromuscular Re-education 5 KT for right shoulder x 3 strips and 1 longitudinal strip for right poster neck   Therapeutic Activity     Therapeutic Exercises 10 Ex given see flow sheet   Gait training     Modality__________________                Total 60    Blank areas are intentional and mean the treatment did not include these items.     PT Evaluation Code: (Please list factors)  Patient History/Comorbidities: pregnant, recent opioid abuse, PTSD, GERD, anxiety, chronic history of Migraines  Examination: limited neck AROM, limited right shoulder AROM, generalized weakness spinal muscles and UE muscles  Clinical Presentation: evolving with pregnancy  and recent opioid abuse  Clinical Decision Making: low    Patient History/  Comorbidities Examination  (body structures and functions, activity limitations, and/or participation restrictions) Clinical Presentation Clinical Decision Making (Complexity)   No documented Comorbidities or personal factors 1-2 Elements Stable and/or uncomplicated Low   1-2 documented comorbidities or personal factor 3 Elements Evolving clinical presentation with changing characteristics Moderate   3-4 documented comorbidities or personal factors 4 or more Unstable and unpredictable High                Linda Rodriguez, PT  10/24/2017  3:40 PM

## 2021-06-13 NOTE — PROGRESS NOTES
"Lauren Santiago is a 30 y.o. female who is being evaluated via a billable telephone visit.      The patient has been notified of following:     \"This telephone visit will be conducted via a call between you and your physician/provider. We have found that certain health care needs can be provided without the need for a physical exam.  This service lets us provide the care you need with a short phone conversation.  If a prescription is necessary we can send it directly to your pharmacy.  If lab work is needed we can place an order for that and you can then stop by our lab to have the test done at a later time.    Telephone visits are billed at different rates depending on your insurance coverage. During this emergency period, for some insurers they may be billed the same as an in-person visit.  Please reach out to your insurance provider with any questions.    If during the course of the call the physician/provider feels a telephone visit is not appropriate, you will not be charged for this service.\"    Patient has given verbal consent to a Telephone visit? Yes      Patient would like to receive their AVS by AVS Preference: Fred.    Additional provider notes:   Lauren Santiago 30 y.o. female presented for telephone encounter.    Chief Complaint   Patient presents with     Anxiety     Depression        Patient Active Problem List   Diagnosis     Anxiety     Migraine headache      Current Outpatient Medications on File Prior to Visit   Medication Sig Dispense Refill     cholecalciferol, vitamin D3, 1,000 unit capsule        escitalopram oxalate (LEXAPRO) 20 MG tablet TAKE ONE TABLET BY MOUTH EVERY DAY 90 tablet 3     KURVELO, 28, 0.15-0.03 mg per tablet TAKE 1 TABLET BY MOUTH DAILY 84 tablet 2     PNV cmb#95-ferrous fumarate-FA (PRENATAL VITAMIN WITH MINERALS) 28 mg iron- 800 mcg Tab TAKE 1 TABLET BY MOUTH ONCE DAILY 90 tablet 0     SUMAtriptan (IMITREX) 50 MG tablet TAKE 1 TABLET(50 MG) BY MOUTH 1 TIME AS NEEDED FOR " MIGRAINE.  MG/ 24 HOUR 30 tablet 12     No current facility-administered medications on file prior to visit.       No past surgical history on file.  Family History   Problem Relation Age of Onset     Ovarian cancer Mother         In remission as of 2017     Mental illness Father      Depression Father      Hypertension Father      Drug abuse Brother      Depression Brother      Heart attack Paternal Grandmother      Hypertension Paternal Grandmother        S:  Getting divorce  Feeling anxious and depressed  Still living under the same household  Feeling safe  Still taking lexapro  Seeing 2 therapists    Little interest or pleasure in doing things: More than half the days  Feeling down, depressed, or hopeless: More than half the days  Trouble falling or staying asleep, or sleeping too much: Nearly every day  Feeling tired or having little energy: Nearly every day  Poor appetite or overeating: More than half the days  Feeling bad about yourself - or that you are a failure or have let yourself or your family down: More than half the days  Trouble concentrating on things, such as reading the newspaper or watching television: Nearly every day  Moving or speaking so slowly that other people could have noticed. Or the opposite - being so fidgety or restless that you have been moving around a lot more than usual: Not at all  Thoughts that you would be better off dead, or of hurting yourself in some way: Not at all  PHQ-9 Total Score: 17  If you checked off any problems, how difficult have these problems made it for you to do your work, take care of things at home, or get along with other people?: Extremely dIfficult    DAE-7 Screening Results:  Feeling nervous, anxious or on edge: 3 (11/18/2020 10:00 AM)  Not being able to stop or control worry: 3 (11/18/2020 10:00 AM)  Worrying too much about different things: 3 (11/18/2020 10:00 AM)  Trouble relaxing: 3 (11/18/2020 10:00 AM)  Being so restless that is is hard to  sit still: 3 (11/18/2020 10:00 AM)  Becoming easily annnoyed or irritable: 3 (11/18/2020 10:00 AM)  Feeling afraid as if something awful might happen: 3 (11/18/2020 10:00 AM)  DAE-7 Total: 21 (11/18/2020 10:00 AM)  How difficult did these problems make it for you to do your work, take care of things at home or get along with other people? : Extremely difficult (11/18/2020 10:00 AM)  How difficult did these problems make it for you to do your work, take care of things at home or get along with other people? : Extremely difficult (11/18/2020 10:00 AM)      O:  Patient sound alert, coherent, and not in distress    Assessment/Plan:  1. Mild episode of recurrent major depressive disorder (H)  Start effexor  Continue with lexapro 20mg  Will refer to psychiatry  Continue with therapy  Motivational interviewing was utilized today.  Modified cognitive behavioral therapy was performed with counseling on internal locus of control.  Discussed importance of self care, sleep, nutrition, hydration, exercise, and mindfulness  - venlafaxine (EFFEXOR XR) 37.5 MG 24 hr capsule; Take 1 capsule (37.5 mg total) by mouth daily.  Dispense: 90 capsule; Refill: 0  - AMB REFERRAL TO MENTAL HEALTH AND ADDICTION  - Adult (18+); Psychiatry, ECT and Medication Management; Psychiatry; [facility]: Psychiatry Clinic [phone]; We will contact you to schedule the appointment or please call with any questions; External R...    2. Anxiety  Start effexor  Start hydroxyzine prn  Continue with lexapro 20mg  Will refer to psychiatry  Continue with therapy  Motivational interviewing was utilized today.  Modified cognitive behavioral therapy was performed with counseling on internal locus of control.  Discussed importance of self care, sleep, nutrition, hydration, exercise, and mindfulness    - venlafaxine (EFFEXOR XR) 37.5 MG 24 hr capsule; Take 1 capsule (37.5 mg total) by mouth daily.  Dispense: 90 capsule; Refill: 0  - AMB REFERRAL TO MENTAL HEALTH AND  ADDICTION  - Adult (18+); Psychiatry, ECT and Medication Management; Psychiatry; [facility]: Psychiatry Clinic [phone]; We will contact you to schedule the appointment or please call with any questions; External R...  - hydrOXYzine pamoate (VISTARIL) 50 MG capsule; Take 1 capsule (50 mg total) by mouth 3 (three) times a day as needed for anxiety.  Dispense: 30 capsule; Refill: 0        Phone call duration:  21 minutes    Alyssia Beard MD

## 2021-06-14 NOTE — PROGRESS NOTES
ASSESSMENT/PLAN:  Lauren was seen today for migraine and spasms.    Diagnoses and all orders for this visit:    Myalgia  -     HM2(CBC w/o Differential)  -     Lyme Antibody [place]  -     Comprehensive Metabolic Panel  -     Vitamin B12  -     Vitamin D, Total (25-Hydroxy)  -     Thyroid Cascade  -     Magnesium  We spoke about differential diagnoses  Recommend hydration, nutrition, and working on self-care including mental health and sleep    Migraine without status migrainosus, not intractable, unspecified migraine type  In place of sumatriptan we will do Maxalt  She can also try Fioricet for as needed abortive treatment  Increase Effexor to 75 mg  -     butalbital-acetaminophen-caffeine (FIORICET, ESGIC) -40 mg per tablet; Take 1-2 tablets by mouth every 6 (six) hours as needed for headaches.  -     rizatriptan (MAXALT-MLT) 5 MG disintegrating tablet; Take 1 tablet (5 mg total) by mouth as needed for migraine. Max 30mg/24hrMay repeat in 2 hours if needed    Anxiety/depression  Increase Effexor to 75 mg, continue with Lexapro 20 mg    Tachycardia  -     Electrocardiogram Perform - Clinic  EKG was unremarkable.  Ventricular rate of 62  No further management      SUBJECTIVE:    Lauren Santiago is a 30 y.o. female who came in today     Muscle pain/spasm over the last 3-4 days   Arms, legs, neck  Tight during daytime and pain at night  Affecting sleep  Triggering her migraine  No rash  No fever  No sore throat  No close contact with anyone confirmed to have had Covid over the last 2 weeks    123-125 sitting heart rate noted a couple days ago  She has a heart monitoring watch which I asked her to download the average heart rate and it was stating 75 bpm  Pulse today is 82 bpm  No chest pain or shortness of breath    Feeling little anxious  But better now than she had been a month ago  Was having marital strain but is working it out with her   They were both in marriage counseling  Patient is taking Lexapro  and Effexor  Migraine has been worse because of her pain as described above and inability to sleep  Currently taking Effexor for prevention and sumatriptan for abortive therapy.  She thinks amitriptyline makes her feel worse off and is not taking sumatriptan    Review of Systems (except those mentioned above)  Constitutional: Negative.   HENT: Negative.   Eyes: Negative.   Respiratory: Negative.   Cardiovascular: Negative.   Gastrointestinal: Negative.   Endocrine: Negative.   Genitourinary: Negative.   Musculoskeletal: Negative.   Skin: Negative.   Allergic/Immunologic: Negative.   Neurological: Negative.   Hematological: Negative.   Psychiatric/Behavioral: Negative.     Patient Active Problem List    Diagnosis Date Noted     Migraine headache 06/23/2015     Anxiety 06/16/2015     No Known Allergies  Current Outpatient Medications   Medication Sig Dispense Refill     butalbital-acetaminophen-caffeine (FIORICET, ESGIC) -40 mg per tablet Take 1-2 tablets by mouth every 6 (six) hours as needed for headaches. 20 tablet 0     cholecalciferol, vitamin D3, 1,000 unit capsule        escitalopram oxalate (LEXAPRO) 20 MG tablet TAKE ONE TABLET BY MOUTH EVERY DAY 90 tablet 3     hydrOXYzine pamoate (VISTARIL) 50 MG capsule Take 1 capsule (50 mg total) by mouth 3 (three) times a day as needed for anxiety. 30 capsule 0     KURVELO, 28, 0.15-0.03 mg per tablet TAKE 1 TABLET BY MOUTH DAILY 84 tablet 2     PNV cmb#95-ferrous fumarate-FA (PRENATAL VITAMIN WITH MINERALS) 28 mg iron- 800 mcg Tab TAKE 1 TABLET BY MOUTH ONCE DAILY 90 tablet 0     rizatriptan (MAXALT-MLT) 5 MG disintegrating tablet Take 1 tablet (5 mg total) by mouth as needed for migraine. Max 30mg/24hrMay repeat in 2 hours if needed 30 tablet 0     venlafaxine (EFFEXOR XR) 37.5 MG 24 hr capsule Take 1 capsule (37.5 mg total) by mouth daily. 90 capsule 0     No current facility-administered medications for this visit.      Past Medical History:   Diagnosis Date      Anxiety 2015     GERD (gastroesophageal reflux disease) 2015     Headache      Migraine headache 2015     Mood disorder (H) 2015     Opiate abuse, episodic (H)     Stopped and detoxed.     Panic disorder 2015     PTSD (post-traumatic stress disorder) 2009     No past surgical history on file.  Social History     Socioeconomic History     Marital status:      Spouse name: None     Number of children: None     Years of education: None     Highest education level: None   Occupational History     None   Social Needs     Financial resource strain: None     Food insecurity     Worry: None     Inability: None     Transportation needs     Medical: None     Non-medical: None   Tobacco Use     Smoking status: Former Smoker     Years: 5.00     Types: Cigarettes     Quit date: 2012     Years since quittin.0     Smokeless tobacco: Never Used     Tobacco comment: e-cig   Substance and Sexual Activity     Alcohol use: No     Drug use: Yes     Types: Benzodiazepines     Comment: In treatment currently (2017); wants help geting off of Suboxone and Valium;      Sexual activity: Yes     Partners: Male   Lifestyle     Physical activity     Days per week: None     Minutes per session: None     Stress: None   Relationships     Social connections     Talks on phone: None     Gets together: None     Attends Taoist service: None     Active member of club or organization: None     Attends meetings of clubs or organizations: None     Relationship status: None     Intimate partner violence     Fear of current or ex partner: None     Emotionally abused: None     Physically abused: None     Forced sexual activity: None   Other Topics Concern     None   Social History Narrative    Lives with family.      Family History   Problem Relation Age of Onset     Ovarian cancer Mother         In remission as of      Mental illness Father      Depression Father      Hypertension Father      Drug abuse  Brother      Depression Brother      Heart attack Paternal Grandmother      Hypertension Paternal Grandmother          OBJECTIVE:    Vitals:    01/07/21 1348   BP: 120/80   Patient Site: Left Arm   Patient Position: Sitting   Cuff Size: Adult Regular   Pulse: 82   Temp: 98.3  F (36.8  C)   TempSrc: Oral   SpO2: 100%   Weight: 149 lb (67.6 kg)     Body mass index is 24.05 kg/m .    Physical Exam:  Constitutional: Patient was oriented to person, place, and time. Patient appeared well-developed and well-nourished. No distress.   Head: Normocephalic and atraumatic.   Right Ear: External ear normal.   Left Ear: External ear normal.   Eyes: Conjunctivae and EOM were normal. right eye exhibited no discharge. Left eye exhibited no discharge. No scleral icterus.   Neck: Neck supple. No JVD present. No tracheal deviation present. No thyromegaly present.   Lymphadenopathy:  Patient has no cervical adenopathy.   Cardiovascular: Normal rate, regular rhythm, normal heart sounds and intact distal pulses. No murmur heard.   Pulmonary/Chest: Effort normal and breath sounds normal. No stridor. No respiratory distress. Patient had no wheezes, no rales, exhibits no tenderness.   Skin: Skin was warm and dry. No rash noted. Patient was not diaphoretic. No erythema. No pallor.       Results for orders placed or performed in visit on 01/07/21   Electrocardiogram Perform - Clinic   Result Value Ref Range    SYSTOLIC BLOOD PRESSURE      DIASTOLIC BLOOD PRESSURE      VENTRICULAR RATE 62 BPM    ATRIAL RATE 62 BPM    P-R INTERVAL 184 ms    QRS DURATION 92 ms    Q-T INTERVAL 412 ms    QTC CALCULATION (BEZET) 418 ms    P Axis 60 degrees    R AXIS 41 degrees    T AXIS 33 degrees    MUSE DIAGNOSIS       Normal sinus rhythm  Normal ECG  When compared with ECG of 21-AUG-2016 17:58,  Nonspecific T wave abnormality no longer evident in Anterolateral leads

## 2021-06-14 NOTE — PROGRESS NOTES
[facility] Discharge Summary  Patient Name: Lauren Santiago  Date: 11/14/2017  Referral Diagnosis: Mixed migraine and muscle contraction headache  Referring provider: Mo Terry MD  Visit Diagnosis:   1. Tension headache     2. Cervical radiculitis     3. Poor posture     4. Generalized muscle weakness         Goals:  Pt. will demonstrate/verbalize independence in self-management of condition in : 6 weeks  Pt. will be independent with home exercise program in : 6 weeks  Pt. will report decreased intensity, frequency of : in 6 weeks  Pt. will have improved quality of sleep: waking less times/night;in 6 weeks  Patient Turn Head: for driving;with less pain;in 6 weeks  No Data Recorded    Patient was seen for 1 visits on 10/24/17  with 1 missed appointments. She was scheduled for a follow up today, but did not show for her appointment.  It  Has been 3 weeks since her initial visit with no follow up.    The patient has not shown for their scheduled appointment(s) and has not called to reschedule.  Goals were not met.    Therapy will be discontinued at this time.  The patient will need a new referral to resume.    Thank you for your referral.  Linda Rodriguez  11/14/2017  3:41 PM

## 2021-06-14 NOTE — PROGRESS NOTES
Subjective:      Lauren Santiago is a 27 y.o. female who presents today for possible strep throat.    Symptoms started 11/10/2017 with a runny nose and sneezing.  States yesterday morning she had a sore throat that started and then last night had a hard time swallowing.  Denies fever, nausea, and vomiting.  Endorses chills and diarrhea a couple of days ago which have now subsided.    Of note she has had on and off migraines through out her pregnancy.  Endorses that her daughter is currently sick with cold like symptoms-runny nose, sneezing, and coughing.  She has tried Tylenol and Benadryl at home which was not helpful.  Endorses itchy eyes and coughing up occasional yellow sputum.  She is currently pregnancy and 36 weeks.  Was seen by her OB GYN Dr. Mcfarlane at [facility].  Denies wheezing, shortness of breath, chest pain. Denies abdominal pain.     Denies any other acute concerns at today's visit.      Reviewed past medical/surgical history, family history, social history, medications and updated chart below.       No Known Allergies  Past Medical History:   Diagnosis Date     Anxiety 6/16/2015     GERD (gastroesophageal reflux disease) 5/23/2015     Headache      Migraine headache 6/23/2015     Mood disorder 6/16/2015     Opiate abuse, episodic     Stopped and detoxed.     Panic disorder 6/23/2015     PTSD (post-traumatic stress disorder) 2009     No past surgical history on file.  Social History     Social History     Marital status:      Spouse name: N/A     Number of children: N/A     Years of education: N/A     Occupational History     Not on file.     Social History Main Topics     Smoking status: Former Smoker     Smokeless tobacco: Current User      Comment: e-cig     Alcohol use No     Drug use: Yes     Special: Benzodiazepines      Comment: In treatment currently (4/2017); wants help geting off of Suboxone and Valium;      Sexual activity: Yes     Partners: Male     Other Topics Concern     Not  on file     Social History Narrative    Lives with family.      Family History   Problem Relation Age of Onset     Ovarian cancer Mother      In remission as of 2017     Mental illness Father      Depression Father      Hypertension Father      Drug abuse Brother      Depression Brother      Heart attack Paternal Grandmother      Hypertension Paternal Grandmother      Current Outpatient Prescriptions   Medication Sig Dispense Refill     diphenhydrAMINE (BENADRYL) 25 mg capsule Take 1 capsule (25 mg total) by mouth 4 (four) times a day as needed for itching. 40 capsule 0     escitalopram oxalate (LEXAPRO) 10 MG tablet Take 10 mg by mouth daily.       prenatal vit 10-iron-folic-dha 65-1-250 mg Cmpk Take by mouth.       QUEtiapine (SEROQUEL) 300 MG tablet Take 0.5 tablets by mouth at bedtime.       No current facility-administered medications for this visit.      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     Convulsions, unspecified convulsion type      Benzodiazepine withdrawal 08/14/2016     PTSD (post-traumatic stress disorder) 07/07/2016     Severe benzodiazepine use disorder 06/13/2016     Neuroleptic consent obtained on 10 Neda 2016 06/10/2016     Opioid use disorder, severe, dependence 06/10/2016     Acne 01/05/2016     Controlled substance agreement signed 07/20/2015     Opiate abuse, episodic 07/14/2015     Migraine headache 06/23/2015     Panic disorder 06/23/2015     Anxiety 06/16/2015     Affective bipolar disorder 06/16/2015     Difficulty concentrating 06/16/2015     GERD (gastroesophageal reflux disease) 05/23/2015       Review of Systems   Constitutional: Negative.   HENT: headache, History of migraine.   Eyes: Negative.   Respiratory: cough and sneezing.   Cardiovascular: Negative.   Gastrointestinal: Negative.    Endocrine: Negative.   Genitourinary: Negative.   Musculoskeletal: Negative.   Skin: Negative.   Allergic/Immunologic: Negative.   Neurological: Negative.   Hematological: Negative.   Psychiatric/Behavioral: Negative.     Objective:    Physical Exam   /58 (Patient Site: Left Arm, Patient Position: Sitting, Cuff Size: Adult Regular)  Pulse 100  Temp 98.1  F (36.7  C) (Oral)   Wt 170 lb 1.6 oz (77.2 kg)  LMP 02/28/2017  BMI 27.45 kg/m2    Constitutional: Alert and oriented x3, well nourished without any acute distress  HENT:   Right Ear: External ear normal. TM without erythema.  Left Ear: External ear normal.  TM without erythema.  Nose: Nose normal. Bilateral turbinates moist and erythematous.  Mouth/Throat: Oropharynx is clear and moist. Oropharynx erythematous.  Eyes: Conjunctivae and EOM are normal. Pupils are equal, round, and reactive to light. Right eye exhibits no discharge. Left eye exhibits no discharge.   Neck: No thyromegaly present.   Lymphadenopathy: Without palpable lymphadenopathy  Cardiovascular: Normal S1 and S2. Regular rate, rhythm and no murmur, rubs or gallops. Palpable distal pulses bilaterally.  Pulmonary/Chest: Normal effort. Lungs clear to auscultation in all lobes. Without wheezing, rhonchi or crackles.    Psychiatric: Normal mood and affect.       Assessment/Plan:    1. Phryngitis  Will do a rapid strep test today-negative.  Denies fever, nausea, vomiting, body aches, shortness of breath, and wheezing.  Education done on supportive measures-hydration, rest, salt water gargles, and humidification.  Discussed red flags that would warrant urgent evaluation. Patient verbalized understanding.  Discussed with patient to follow up if worsening symptoms, questions, or concerns.    Recent Results (from the past 24 hour(s))   Rapid Strep A Screen-Throat   Result Value Ref Range    Rapid Strep A Antigen No Group A Strep detected, presumptive negative No Group A Strep detected,  presumptive negative       - Rapid Strep A Screen-Throat    Office visit and charting was completed with Kaylah Eng, Student NP  Enedina Harris, JOLENE  11/17/2017

## 2021-06-15 NOTE — PROGRESS NOTES
ASSESSMENT/PLAN:    Routine general medical examination at a health care facility  We discussed healthy lifestyle, nutrition, cardiovascular risk reduction, self care, safety, sunscreen, and seatbelt use.  Health maintenance screening and immunizations reviewed with the patient.    Cervical cancer screening  -     Gynecologic Cytology (PAP Smear)    Migraine  Taking amitriptyline  Stopped T#3  -     Ambulatory referral to Neurology  -     Ambulatory referral to Chiropractic    Anxiety/depression  Stable on lexapro    CHIEF COMPLAINT:  Chief Complaint   Patient presents with     Annual Exam       SUBJECTIVE:  Lauren Santiago is a 27 y.o. female who is here for a health maintenance visit. Her blood pressure and pulse are within normal limits. Her BMI is 23. She is due for a PAP smear today. No history of abnormal PAP smear. Her immunizations are up to date.     Migraines: She would like to see a specialist for the migraines. She had previously seen a neurologist, but felt like she just kept trying new medications. [facility] and [facility] therapy for the migraines. She is taking the amitriptyline daily for headaches. She feels like this is helping because she has not had many migraines, just some normal headaches. The Tylenol #3 did help the headaches, but she felt like the rebound headache was worse, so she stopped.     REVIEW OF SYSTEMS:   She continues the Lexapro 10mg daily for anxiety, and this works well. She is about 3 weeks post partum, and is still having some bleeding. She is still breast feeding. She is not using/taking any birth control. All other systems are negative.    PFSH:  No new history.     History   Smoking Status     Former Smoker   Smokeless Tobacco     Current User     Comment: e-cig       Family History   Problem Relation Age of Onset     Ovarian cancer Mother      In remission as of 2017     Mental illness Father      Depression Father      Hypertension  "Father      Drug abuse Brother      Depression Brother      Heart attack Paternal Grandmother      Hypertension Paternal Grandmother        No past surgical history on file.    No Known Allergies      OBJECTIVE:   Physical Exam   Vitals:    01/08/18 1033   BP: 100/70   Pulse: 68   Weight: 145 lb 8 oz (66 kg)   Height: 5' 6\" (1.676 m)     Estimated body mass index is 23.48 kg/(m^2) as calculated from the following:    Height as of this encounter: 5' 6\" (1.676 m).    Weight as of this encounter: 145 lb 8 oz (66 kg).    Constitutional: Patient is oriented to person, place, and time. Patient appears well-developed and well-nourished. No distress.   Head: Normocephalic and atraumatic.   Right Ear: External ear normal. Ear canal and TM normal.   Left Ear: External ear normal. Ear canal and TM normal.   Nose: Nose normal.   Mouth/Throat: Oropharynx is clear and moist. No oropharyngeal exudate.   Eyes: Conjunctivae and EOM are normal. Pupils are equal, round, and reactive to light. Right eye exhibits no discharge. Left eye exhibits no discharge. No scleral icterus.   Neck: Neck supple. No JVD present. No tracheal deviation present. No thyromegaly present.   Breasts:  Normal appearing, no skin involvement, no palpable mass, no tenderness on palpation.  No axillary involvement  Cardiovascular: Normal rate, regular rhythm, normal heart sounds and intact distal pulses. No murmur heard.   Pulmonary/Chest: Effort normal and breath sounds normal. No stridor. No respiratory distress. Patient has no wheezes, no rales, exhibits no tenderness.   Abdominal: Soft. Bowel sounds are normal. Patient exhibits no distension and no mass. There is no tenderness. There is no rebound and no guarding.   Lymphadenopathy:  Patient has no cervical adenopathy.   Neurological: Patient is alert and oriented to person, place, and time. Patient has normal reflexes. No cranial nerve deficit. Coordination normal.   Skin: Skin is warm and dry. No rash " noted. Patient is not diaphoretic. No erythema. No pallor.   Pelvic:  Normal external genitalia with Normal vulva.  Normal vagina with no polyps or lesions and with physiologic discharge.  Normal cervix with normal mucosa and without CMT.  No adnexal masses  Psychiatric: Patient has good eye contact without any psychomotor retardation or stereotypic behaviors.  normal mood and affect. Judgment and thought content normal.   Speech is regular rate and rhythm.         ADDITIONAL HISTORY SUMMARIZED (2): None.  DECISION TO OBTAIN EXTRA INFORMATION (1): None.   RADIOLOGY TESTS (1): None.  LABS (1): Ordered labs today.  MEDICINE TESTS (1): None.  INDEPENDENT REVIEW (2 each): None.     The visit lasted a total of 15 minutes face to face with the patient. Over 50% of the time was spent counseling and educating the patient about health maintenance.    I, Angely Keller, am scribing for and in the presence of, Dr. Hay.    IAlyssia MD , personally performed the services described in this documentation, as scribed by Angely Keller in my presence, and it is both accurate and complete.      MEDICATIONS:  Current Outpatient Prescriptions   Medication Sig Dispense Refill     amitriptyline (ELAVIL) 10 MG tablet Take 1 tablet (10 mg total) by mouth at bedtime. 30 tablet 0     escitalopram oxalate (LEXAPRO) 10 MG tablet Take 10 mg by mouth daily.       prenat.vits,gabriel,min-iron-folic (PRENATAL VITAMIN) Tab Take 1 tablet by mouth daily. 30 each 0     No current facility-administered medications for this visit.          Total data points: 1

## 2021-06-15 NOTE — PROGRESS NOTES
While running the upcoming appointments report for [facility] enrolled patients, I came across this patient's name  I researched the chart and found that the patient had enrolled with CCC on 8/23/16 (completed the Goal Setting appointment) with Care [facility]honny, Raj Nguyen, at the [facility]    On 10/18/16 and 11/7/16, Raj had made attempts to follow up with the patient and her goals; however, was unsuccessful  There were no more attempts made after that    I met with the patient while she was in the clinic seeing Dr. Hay today  Introduced myself and explained my role  Patient faintly recalled her participation with [facility] and Sabrina Barry, Raj    Patient questioned what her goal was  I read to her that it states: to get help with her addiction  Patient states she has completed in patient and out patient treatment  I completed this goal    I questioned if the patient had any additional goals she would like to work on  Patient questioned what CCC is  I explained [facility]  Patient stated she is not interested in being enrolled at this time    I provided the patient with my business card and explained that if she changes her mind she can call me directly and we can discuss enrolling  She can also let Dr. Hay know if she would like to enroll and an order can be placed and/or I can meet with the patient while she is in the clinic    I have discharged the patient from [facility] at this time

## 2021-06-17 NOTE — TELEPHONE ENCOUNTER
Telephone Encounter by Rosa Winters LPN at 9/28/2020 12:36 PM     Author: Rosa Winters LPN Service: -- Author Type: Licensed Nurse    Filed: 9/28/2020 12:37 PM Encounter Date: 9/28/2020 Status: Signed    : Rosa Winters LPN (Licensed Nurse)       Patient Returning Call  Reason for call:  Patient returning call   Information relayed to patient:  Niko Bruno CMA           9/28/20 12:09 PM   Note      [employer] [facility] message sent. Mailbox is full      Niko Bruno CMA   to Lauren Santiago            9/28/20 12:08 PM   Hi Lauren,     I called but your voice mail box is full so I could leave  message. [facility] does not do Paternity test. I am sending you a link where you can get information where and how to go about to have this done.  If you need anything from Dr Hay let us know.   I think we need to cancel Clive's appointment for today unless you have question or concern related to Clive other than the Paternity test. Let me know if you want to cancel or you can cancel online on [email or web address].  Have a great day.     [email or web address]  Patient has additional questions:  No  If YES, what are your questions/concerns:  Patient verbalized understanding above message .  Okay to leave a detailed message?: No

## 2021-06-17 NOTE — PATIENT INSTRUCTIONS - HE
Patient Instructions by Leena Jensen CNP at 1/25/2019 11:10 AM     Author: Leena Jensen CNP Service: -- Author Type: Nurse Practitioner    Filed: 1/25/2019 11:55 AM Encounter Date: 1/25/2019 Status: Addendum    : Leena Jensen CNP (Nurse Practitioner)    Related Notes: Original Note by Leena Jensen CNP (Nurse Practitioner) filed at 1/25/2019 11:55 AM         Patient Education     Pharyngitis (Sore Throat), Report Pending    Pharyngitis (sore throat) is often due to a virus. It can also be caused by streptococcus (strep), bacteria. This is often called strep throat. Both viral and strep infections can cause throat pain that is worse when swallowing, aching all over, headache, and fever. Both types of infections are contagious. They may be spread by coughing, kissing, or touching others after touching your mouth or nose.  A test has been done to find out if you or your child have strep throat. Call this facility or your healthcare provider if you were not given your test results. If the test is positive for strep infection, you will need to take antibiotic medicines. A prescription can be called into your pharmacy at that time. If the test is negative, you probably have a viral pharyngitis. This does not need to be treated with antibiotics. Until you receive the results of the strep test, you should stay home from work. If your child is being tested, he or she should stay home from school.  Home care    Rest at home. Drink plenty of fluids so you won't get dehydrated.    If the test is positive for strep, you or your child should not go to work or school for the first 2 days of taking the antibiotics. After this time, you or your child will not be contagious. You or your child can then return to work or school when feeling better.     Use the antibiotic medicine for the full 10 days. Do not stop the medicine even if you or your child feel better. This is very important  to make sure the infection is fully treated. It is also important to prevent medicine-resistant germs from growing. If you or your child were given an antibiotic shot, no more antibiotics are needed.    Use throat lozenges or numbing throat sprays to help reduce pain. Gargling with warm salt water will also help reduce throat pain. Dissolve 1/2 teaspoon of salt in 1 glass of warm water. Children can sip on juice or a popsicle. Children 5 years and older can also suck on a lollipop or hard candy.    Don't eat salty or spicy foods or give them to your child. These can irritate the throat.  Other medicine for a child: You can give your child acetaminophen for fever, fussiness, or discomfort. In babies over 6 months of age, you may use ibuprofen instead of acetaminophen. If your child has chronic liver or kidney disease or ever had a stomach ulcer or GI bleeding, talk with your ana healthcare provider before giving these medicines. Aspirin should never be used by any child under 18 years of age who has a fever. It may cause severe liver damage.  Other medicine for an adult: You may use acetaminophen or ibuprofen to control pain or fever, unless another medicine was prescribed for this. If you have chronic liver or kidney disease or ever had a stomach ulcer or GI bleeding, talk with your healthcare provider before using these medicines.  Follow-up care  Follow up with your healthcare provider or our staff if you or your child don't get better over the next week.  When to seek medical advice  Call your healthcare provider right away if any of these occur:    Fever as directed by your healthcare provider. For children, seek care if:  ? Your child is of any age and has repeated fevers above 104 F (40 C).  ? Your child is younger than 2 years of age and has a fever of 100.4 F (38 C) for more than 1 day.  ? Your child is 2 years old or older and has a fever of 100.4 F (38 C) for more than 3 days.    New or worsening ear  pain, sinus pain, or headache    Painful lumps in the back of neck    Stiff neck    Lymph nodes are getting larger    ?Cant swallow liquids, a lot of drooling, or cant open mouth wide due to throat pain    Signs of dehydration, such as very dark urine or no urine, sunken eyes, dizziness    Trouble breathing or noisy breathing    Muffled voice    New rash    Other symptoms getting worse  Prevention  Here are steps you can take to help prevent an infection:    Keep good hand washing habits.    Dont have close contact with people who have sore throats, colds, or other upper respiratory infections.    Dont smoke, and stay away from secondhand smoke.    Stay up to date with of your vaccines.  Date Last Reviewed: 11/1/2017 2000-2017 [employer] [address] [place] [address], [place] [address]. All rights reserved. This information is not intended as a substitute for professional medical care. Always follow your healthcare professional's instructions.                 Clothing

## 2021-06-19 NOTE — PROGRESS NOTES
ASSESSMENT/PLAN:    Vaginal discharge  28-year-old female presents with vaginal irritation and discharge.  Blood prep was unremarkable.  Physical examination was unremarkable.  I recommend that she continues to monitor her symptoms and reassurance was provided to her.  She will follow-up if she has further concerns or worsening symptoms.  She verbalized understanding and agreed with the plan  -     Wet Prep, Vaginal    Pain in both upper extremities, and at times pain in lower extremities  I suspect the symptoms are related to mild de Quervain's tenosynovitis and overuse pain from rocking her 20lbs baby constantly.  We spoke about supportive cares and over-the-counter analgesics.    Other orders  -     prenat.vits,gabriel,min-iron-folic (PRENATAL VITAMIN) Tab; Take 1 tablet by mouth daily.      SUBJECTIVE:    Lauren Santiago is a 28 y.o. female who came in today for few days of vaginal irritation and vaginal discharge.  No abnormal vaginal bleeding.  No urinary symptoms.  She thinks she may have a yeast infection.  Denies fever.  Denies concerns for sexually transmitted infections    Over the last couple weeks she has noted pain along both her wrists especially with ulnar deviation of the wrists.  She has also noted soreness of both her arms and her legs.  She stated that she holds her 20 pound baby constantly and walks them all the time.  She is still nursing her baby.  She denies any injury or direct trauma to the extremities.  She has not been medicating for this pain.  The pain does not limit her ability to function.    Review of Systems (except those mentioned above)  Constitutional: Negative.   HENT: Negative.   Eyes: Negative.   Respiratory: Negative.   Cardiovascular: Negative.   Gastrointestinal: Negative.   Endocrine: Negative.   Genitourinary: Negative.   Musculoskeletal: Negative.   Skin: Negative.   Allergic/Immunologic: Negative.   Neurological: Negative.   Hematological: Negative.   Psychiatric/Behavioral:  Negative.     Patient Active Problem List    Diagnosis Date Noted     Insomnia due to psychological stress 08/15/2016     Benzodiazepine withdrawal (H) 08/14/2016     PTSD (post-traumatic stress disorder) 07/07/2016     Severe benzodiazepine use disorder (H) 06/13/2016     Opioid use disorder, severe, dependence (H) 06/10/2016     Controlled substance agreement signed 07/20/2015     Opiate abuse, episodic 07/14/2015     Migraine headache 06/23/2015     Anxiety 06/16/2015     Affective bipolar disorder (H) 06/16/2015     No Known Allergies  Current Outpatient Prescriptions   Medication Sig Dispense Refill     butalbital-acetaminophen-caffeine (FIORICET, ESGIC) -40 mg per tablet Take 1-2 tablets by mouth every 6 (six) hours as needed for headaches. 20 tablet 0     escitalopram oxalate (LEXAPRO) 10 MG tablet Take 10 mg by mouth daily.       prenat.vits,gabriel,min-iron-folic (PRENATAL VITAMIN) Tab Take 1 tablet by mouth daily. 90 each 3     acetaminophen-codeine (TYLENOL #3) 300-30 mg per tablet TAKE 1 TABLET BY MOUTH EVERY 4 HOURS AS NEEDED FOR PAIN. MAX OF 9 TABLETS PER MONTH 30 tablet 0     amitriptyline (ELAVIL) 10 MG tablet TAKE 1 TABLET(10 MG) BY MOUTH AT BEDTIME 30 tablet 6     No current facility-administered medications for this visit.      Past Medical History:   Diagnosis Date     Anxiety 6/16/2015     GERD (gastroesophageal reflux disease) 5/23/2015     Headache      Migraine headache 6/23/2015     Mood disorder (H) 6/16/2015     Opiate abuse, episodic     Stopped and detoxed.     Panic disorder 6/23/2015     PTSD (post-traumatic stress disorder) 2009     No past surgical history on file.  Social History     Social History     Marital status:      Spouse name: N/A     Number of children: N/A     Years of education: N/A     Social History Main Topics     Smoking status: Former Smoker     Smokeless tobacco: Current User      Comment: e-cig     Alcohol use No     Drug use: Yes     Special:  Benzodiazepines      Comment: In treatment currently (4/2017); wants help geting off of Suboxone and Valium;      Sexual activity: Yes     Partners: Male     Other Topics Concern     None     Social History Narrative    Lives with family.      Family History   Problem Relation Age of Onset     Ovarian cancer Mother      In remission as of 2017     Mental illness Father      Depression Father      Hypertension Father      Drug abuse Brother      Depression Brother      Heart attack Paternal Grandmother      Hypertension Paternal Grandmother          OBJECTIVE:    Vitals:    07/17/18 1129   BP: 112/60   Pulse: 64   Weight: 123 lb 2 oz (55.8 kg)     Body mass index is 19.87 kg/(m^2).    Physical Exam:  Constitutional: Patient was oriented to person, place, and time. Patient appeared well-developed and well-nourished. No distress.   Head: Normocephalic and atraumatic.   Right Ear: External ear normal.   Left Ear: External ear normal.   Nose: Nose normal.   Vaginal: Normal vaginal exam.  Normal-appearing cervix.  No cervical motion tenderness.  No adnexal mass  Musculoskeletal: No swelling, deformity, she does have old bruising on her legs.  She has full range of motion for upper and lower extremities without pain or limitation      Results for orders placed or performed in visit on 07/17/18   Wet Prep, Vaginal   Result Value Ref Range    Yeast Result No yeast seen No yeast seen    Trichomonas No Trichomonas seen No Trichomonas seen    Clue Cells, Wet Prep No Clue cells seen No Clue cells seen

## 2021-06-20 NOTE — PROGRESS NOTES
ASSESSMENT/PLAN:  Headache, chronic migraine without aura, intractable  Chronic 10 year history  Location:  Frontal, temporal, occipital  Headache days/month:  25+ days with headaches  Headache duration hrs/day:  12+ hours  Over the past 3 months headaches have become more often  Disability due to headaches: often missed /canceled plans because of headaches.  She has gone to the emergency department numerous times because of her headaches.  Medications previously tried: Amitriptyline, topiramate, propanolol, gabapentin as preventative treatment without success.  She has tried Zofran, sumatriptan, Rizatriptan, ibuprofen, acetaminophen, tramadol, Fioricet as acute abortive treatments.  She has also tried physical therapy through [facility] rehab and has not noted any difference  At the present time, she takes high-dose ibuprofen with high-dose acetaminophen and occasional tramadol.  The barrier at this time is also that she is breast-feeding    Plan:  Refill tramadol (L2 breastfeeding category) and  sumatriptan (L3 breastfeeding category) for abortive therapy  Will trial verapamil 80mg daily for preventative therapy.  Watch out for low blood pressure.  She had an MRI of the brain that was done in 2017 which showed Chiari I malformation with tonsils projecting 7.6 mm over the foramen magnum, as opposed to an MRI from 2015 which showed the Chiari malformation with tonsils 5 mm before the level of the foramen magnum.  She will follow-up with [facility].  I strongly support the use of botulinum toxin injection for the convention of chronic migraine for this patient.  -     traMADol (ULTRAM) 50 mg tablet; Take 1 tablet daily as needed for headaches.  No more than 10 tablets per month  -     verapamil (CALAN) 80 MG tablet; Take 1 tablet (80 mg total) by mouth 3 (three) times a day.    SUBJECTIVE:    Lauren Santiago is a 28 y.o. female who came in today     Chronic 10 year history  Location:  Frontal,  temporal, occipital  Headache days/month:  25+ days with headaches  Headache duration hrs/day:  12+ hours  Over the past 3 months headaches have become more often  Disability due to headaches: often missed /canceled plans because of headaches.  She has gone to the emergency department numerous times because of her headaches.  Medications previously tried: Amitriptyline, topiramate, propanolol, gabapentin as preventative treatment without success.  She has tried Zofran, sumatriptan, Rizatriptan, ibuprofen, acetaminophen, tramadol, Fioricet as acute abortive treatments.  She has also tried physical therapy through [facility] rehab and has not noted any difference  At the present time, she takes high-dose ibuprofen with high-dose acetaminophen and occasional tramadol.  The barrier at this time is also that she is breast-feeding    [facility] was [facility], [facility].  There was a note for review.  It was mentioned that the patient had an MRI of the brain that showed Chiari I malformation with tonsils projecting at 7.6 mm over the foramen magnum.  This was also compared with an MRI from 2015 which showed Chiari malformation with tonsils 5 mm before the level of the foramen magnum.    The patient is on a restricted insurance plan due to her history of severe benzodiazepine use disorder and opioid abuse.  At the present time, these conditions are in remission.      Review of Systems (except those mentioned above)  Constitutional: Negative.   HENT: Negative.   Eyes: Negative.   Respiratory: Negative.   Cardiovascular: Negative.   Gastrointestinal: Negative.   Endocrine: Negative.   Genitourinary: Negative.   Musculoskeletal: Negative.   Skin: Negative.   Allergic/Immunologic: Negative.   Neurological: Negative.   Hematological: Negative.   Psychiatric/Behavioral: Negative.     Patient Active Problem List    Diagnosis Date Noted     Insomnia due to psychological stress 08/15/2016      Benzodiazepine withdrawal (H) 08/14/2016     PTSD (post-traumatic stress disorder) 07/07/2016     Severe benzodiazepine use disorder (H) 06/13/2016     Opioid use disorder, severe, dependence (H) 06/10/2016     Controlled substance agreement signed 07/20/2015     Opiate abuse, episodic 07/14/2015     Migraine headache 06/23/2015     Anxiety 06/16/2015     Affective bipolar disorder (H) 06/16/2015     No Known Allergies  Current Outpatient Prescriptions   Medication Sig Dispense Refill     cholecalciferol, vitamin D3, 1,000 unit capsule        escitalopram oxalate (LEXAPRO) 10 MG tablet Take 10 mg by mouth daily.       magnesium citrate 100 mg Tab        ondansetron (ZOFRAN-ODT) 8 MG disintegrating tablet Take 8 mg by mouth.       prenat.vits,gabriel,min-iron-folic (PRENATAL VITAMIN) Tab Take 1 tablet by mouth daily. 90 each 3     amoxicillin (AMOXIL) 500 MG capsule        butalbital-acetaminophen-caffeine (FIORICET, ESGIC) -40 mg per tablet Take 1-2 tablets by mouth every 6 (six) hours as needed for headaches. 20 tablet 0     SUMAtriptan (IMITREX) 50 MG tablet Take 1 tablet (50 mg total) by mouth once as needed for migraine. Max 200mg/24 hr 30 tablet 0     traMADol (ULTRAM) 50 mg tablet Take 1 tablet daily as needed for headaches.  No more than 10 tablets per month 30 tablet 0     verapamil (CALAN) 80 MG tablet Take 1 tablet (80 mg total) by mouth 3 (three) times a day. 30 tablet 0     No current facility-administered medications for this visit.      Past Medical History:   Diagnosis Date     Anxiety 6/16/2015     GERD (gastroesophageal reflux disease) 5/23/2015     Headache      Migraine headache 6/23/2015     Mood disorder (H) 6/16/2015     Opiate abuse, episodic     Stopped and detoxed.     Panic disorder 6/23/2015     PTSD (post-traumatic stress disorder) 2009     No past surgical history on file.  Social History     Social History     Marital status:      Spouse name: N/A     Number of children: N/A      Years of education: N/A     Social History Main Topics     Smoking status: Former Smoker     Years: 5.00     Types: Cigarettes     Quit date: 1/1/2012     Smokeless tobacco: Never Used      Comment: e-cig     Alcohol use No     Drug use: Yes     Special: Benzodiazepines      Comment: In treatment currently (4/2017); wants help geting off of Suboxone and Valium;      Sexual activity: Yes     Partners: Male     Other Topics Concern     None     Social History Narrative    Lives with family.      Family History   Problem Relation Age of Onset     Ovarian cancer Mother      In remission as of 2017     Mental illness Father      Depression Father      Hypertension Father      Drug abuse Brother      Depression Brother      Heart attack Paternal Grandmother      Hypertension Paternal Grandmother          OBJECTIVE:    Vitals:    09/05/18 1018   BP: 104/64   Patient Site: Left Arm   Patient Position: Sitting   Cuff Size: Adult Regular   Pulse: 60   Weight: 123 lb 9.6 oz (56.1 kg)     Body mass index is 19.95 kg/(m^2).    Physical Exam:  Constitutional: Patient was oriented to person, place, and time. Patient appeared well-developed and well-nourished. No distress.   Head: Normocephalic and atraumatic.   Right Ear: External ear normal.   Left Ear: External ear normal.   Nose: Nose normal.   Mouth/Throat: Oropharynx was clear and moist. No oropharyngeal exudate.   Eyes: Conjunctivae and EOM were normal. Pupils were equal, round, and reactive to light. Right eye exhibited no discharge. Left eye exhibited no discharge. No scleral icterus.   Neck: Neck supple. No JVD present. No tracheal deviation present. No thyromegaly present.   Lymphadenopathy:  Patient has no cervical adenopathy.   Cardiovascular: Normal rate, regular rhythm, normal heart t

## 2021-06-23 NOTE — PROGRESS NOTES
Assessment:     1. Pharyngitis, unspecified etiology  Rapid Strep A Screen-Throat    Group A Strep, RNA Direct Detection, Throat     Results for orders placed or performed in visit on 01/25/19   Rapid Strep A Screen-Throat   Result Value Ref Range    Rapid Strep A Antigen No Group A Strep detected, presumptive negative No Group A Strep detected, presumptive negative        Plan:     Differential diagnosis include but not limited to sinus pressure, pharyngitis, strep infection, upper respiratory infection.  Discussed with the patient on exam I did not find any indication for bacterial infection.  During the exam patient endorsed having sinus pressure in the maxillary sinus area.  With this advised patient to take decongestant.  May also use ibuprofen or Tylenol for pain, fever, discomfort.  Increase fluid intake.  Monitor for worsening symptoms.  Follow-up with PCP if her symptoms does not resolve or may return to the clinic if she continues to experience symptoms or if her symptoms get worse.  I discussed red flag symptoms, return precautions to clinic/ER and follow up care with patient/parent.  Expected clinical course, symptomatic cares advised. Questions answered. Patient/parent amenable with plan.     Subjective:       28 y.o. female presents for evaluation sore throat, itchy eyes, headache, and body aches.  Patient reports that her symptoms started yesterday, initially her sore throat started off as a scratchy throat.  She noticed that as the day progressed it became difficult for her to swallow.  She was also having itchy eyes.  She has been taking ibuprofen which does not seem to be effective for her headaches also throat.  Her last dose was about 8 mm.  She has not had any fever.  She admits to a headache, no nausea, vomiting, diarrhea, change in appetite, shortness of breath.  She is not sure if she has been exposed to anyone else with similar symptoms, she has a daughter who was to .  Her son is  also here to be evaluated for fever and possible RSV.         The following portions of the patient's history were reviewed and updated as appropriate: allergies, current medications, past family history, past medical history, past social history, past surgical history and problem list.    Review of Systems  A 12 point comprehensive review of systems was negative except as noted.      Objective:      /62   Pulse (!) 103   Temp 98  F (36.7  C) (Oral)   Resp 18   Wt 120 lb (54.4 kg)   SpO2 96%   BMI 19.37 kg/m    General appearance: alert, appears stated age, cooperative and moderate distress  Head: Normocephalic, without obvious abnormality, atraumatic, sinuses nontender to percussion, sinus pressure  Eyes: conjunctivae/corneas clear. PERRL, EOM's intact. Fundi benign.  Ears: normal TM's and external ear canals both ears  Nose: Nares normal. Septum midline. Mucosa normal. No drainage or sinus tenderness.  Throat: lips, mucosa, and tongue normal; teeth and gums normal  Lungs: clear to auscultation bilaterally  Heart: regular rate and rhythm, S1, S2 normal, no murmur, click, rub or gallop  Extremities: extremities normal, atraumatic, no cyanosis or edema  Pulses: 2+ and symmetric  Skin: Skin color, texture, turgor normal. No rashes or lesions  Lymph nodes: Cervical, supraclavicular, and axillary nodes normal.  Neurologic: Grossly normal     This note has been dictated using voice recognition software. Any grammatical or context distortions are unintentional and inherent to the software

## 2021-06-24 NOTE — TELEPHONE ENCOUNTER
Controlled Substance Refill Request  Medication:   Requested Prescriptions     Pending Prescriptions Disp Refills     traMADol (ULTRAM) 50 mg tablet [Pharmacy Med Name: TRAMADOL 50MG TABLETS] 30 tablet 0     Sig: TAKE 1 TABLET BY MOUTH DAILY AS NEEDED FOR HEADACHES- NO MORE THAN 10 TABLETS PER MONTH     Date Last Fill: 9/5/18  Pharmacy: [facility]   Submit electronically to pharmacy    Controlled Substance Agreement on File:   Encounter-Level CSA Scan Date:    There are no encounter-level csa scan date.       Last office visit with primary: 9/5/2018

## 2021-06-25 NOTE — TELEPHONE ENCOUNTER
This can happen sometimes on these OCPs.  I would have her take tylenol, use heat on the area if having pain, and lay low today, and recheck by phone with Dr Narvaez tomorrow.    TS

## 2021-06-25 NOTE — TELEPHONE ENCOUNTER
"Pt reports \"early menses\" while taking hormonal contraceptive.  States \"Next week would be the placebo tablets.\"  However \"having bleeding this week like a full-on period.\"  Also lower abdominal cramping, similar to childbirth contractions.  \"Passing clots.\"    No profuse bleeding.  No severe abd pain.    Took home pregnancy test two weeks ago due to \"thick vaginal mucous discharge like had when pregnant.\"  Home preg test was negative two weeks ago.    What to do?  No OB providers appear available today; however need OB advice.    Please advise; thank you kindly.  Pharmacy is verified in chart.  Detailed voicemail message okay.     Christie Laguna RN BSBA  [facility] RN Triage     Reason for Disposition    Caller has NON-URGENT question and triager unable to answer question    Protocols used: CONTRACEPTION - BIRTH CONTROL PILLS - COMBINED-A-OH      "

## 2021-06-25 NOTE — TELEPHONE ENCOUNTER
Patient notified, verbalized understanding and agreed with plan.  Ping Salinas CMA [facility] CMT

## 2021-06-26 NOTE — PROGRESS NOTES
Progress Notes by Sammie Schrader CNP at 6/29/2018 10:40 AM     Author: Sammei Schrader CNP Service: -- Author Type: Nurse Practitioner    Filed: 6/29/2018 11:44 AM Encounter Date: 6/29/2018 Status: Signed    : Sammie Schrader CNP (Nurse Practitioner)       ASSESSMENT:   1. Migraine headache  butalbital-acetaminophen-caffeine (FIORICET, ESGIC) -40 mg per tablet   2. Hordeolum externum of left lower eyelid         PLAN:  28-year-old female presents with 2 separate problems today.  First, she notes a small area of tenderness and swelling to her left lower eyelid for the past several days.  Exam is consistent with an external hordeolum.  No evidence of secondary infection.  Patient is advised to use warm packs to the area at least 4 times daily.  We did discuss doing this appropriately.  Second, patient presents with an ongoing migraine headache.  She does have a significant history for migraine headaches.  There are no red flag symptoms today.  Nonfocal neuro exam.  Headache is typical of her headache patterns.  Do not feel advanced imaging is warranted today.  She is given 1 dose of IM Toradol here in the clinic.  Does request a prescription for Fioricet as this has worked for her in the past.  This is provided for her.  She is nursing, we did discuss that this can likely be excreted in the breast milk.  She will follow-up with her primary care provider next week for a recheck.    I discussed red flag symptoms, return precautions to clinic/ER and follow up care with patient/parent.  Expected clinical course, symptomatic cares advised. Questions answered. Patient/parent amenable with plan.    Patient Instructions:  Patient Instructions     Please warm pack your eye as we discussed.  I sent in a prescription for Fioricet.  Please follow up with primary care next week for a recheck.  Sty (or Stye)  A sty is an infection of the oil gland of the eyelid. It may develop into a small pocket of pus (an  abscess). This can cause pain, redness, and swelling. In early stages, a sty is treated with antibiotic cream, eye drops, or a small towel soaked in warm water (a warm compress). More severe cases may need to be opened and drained by a healthcare provider.  Home care    Eye drops or ointment are usually prescribed to treat the infection. Use these as directed.     Artificial tears may also be used to lubricate the eye and make it more comfortable. You can buy these over the counter without a prescription. Talk with your healthcare provider before using any over-the-counter treatment for a sty.    Apply a warm, damp towel to the affected eye for at least 5 minutes, 3 to 4 times a day for a week. Warm compresses open the pores and speed the healing. But if the compresses are too hot, they may burn your eyelid.    Sometimes the sty will drain with this treatment alone. If this happens, keep using the antibiotic until all the redness and swelling are gone.    Wash your hands before and after touching the infected eyelid to avoid spreading the infection.    Dont squeeze or try to break open the sty.  Follow-up care  Follow up with your healthcare provider, or as advised.   When to seek medical advice  Call your healthcare provider right away if any of these occur:    Increase in swelling or redness around the eyelid after 48 to 72 hours    Increase in eye pain or the eyelid blisters    Increase in warmth--the eyelid feels hot    Drainage of blood or thick pus from the sty    Blister on the eyelid    Inability to open the eyelid due to swelling    Fever of 100.4 F (38 C) or above, or as directed by your provider    Vision changes    Headache or stiff neck    The sty comes back  Date Last Reviewed: 8/1/2017 2000-2017 [employer]. [address] [place] [address], [place] [address]. All rights reserved. This information is not intended as a substitute for professional medical care. Always follow your healthcare  professional's instructions.            SUBJECTIVE:   Lauren Santiago is a 28 y.o. female who presents today with 2 separate issues.  First she notes that she has had a small area of swelling to her left lower eyelid for the past 3 days.  This is mildly painful, mildly itchy.  This does not affect her vision.  There has been no drainage from the area.  She has had no fevers.  Also complains of a migraine headache which she has had for the past 5 days.  She does have a history of migraine headaches.  This is consistent with her previous headaches except for the fact that it has lasted as long as it has.  Notes that the pain is all over her head.  She has had no dizziness, no nausea or vomiting.  Does admit to some photophobia.  Headache was not of sudden onset.  Not associated with any neck pain, fevers, sweats, chills, visual disturbances, ataxia or gait disturbance, confusion, nasal congestion.  Patient notes she was unable to get into primary care and that Fioricet has helped her in the past.  She is currently nursing an infant.      ROS:  Comprehensive 12 pt ROS completed, positives noted in HPI, otherwise negative.      Past Medical History:  Patient Active Problem List   Diagnosis   ? Anxiety   ? Affective bipolar disorder (H)   ? Migraine headache   ? Opiate abuse, episodic   ? Controlled substance agreement signed   ? Opioid use disorder, severe, dependence (H)   ? Severe benzodiazepine use disorder (H)   ? PTSD (post-traumatic stress disorder)   ? Benzodiazepine withdrawal (H)   ? Insomnia due to psychological stress       Surgical History:  No past surgical history on file.        Family History:  Family History   Problem Relation Age of Onset   ? Ovarian cancer Mother      In remission as of 2017   ? Mental illness Father    ? Depression Father    ? Hypertension Father    ? Drug abuse Brother    ? Depression Brother    ? Heart attack Paternal Grandmother    ? Hypertension Paternal Grandmother         Reviewed; Non-contributory    History   Smoking Status   ? Former Smoker   Smokeless Tobacco   ? Current User     Comment: e-cig       Current Medications:  Current Outpatient Prescriptions on File Prior to Visit   Medication Sig Dispense Refill   ? acetaminophen-codeine (TYLENOL #3) 300-30 mg per tablet TAKE 1 TABLET BY MOUTH EVERY 4 HOURS AS NEEDED FOR PAIN. MAX OF 9 TABLETS PER MONTH 30 tablet 0   ? escitalopram oxalate (LEXAPRO) 10 MG tablet Take 10 mg by mouth daily.     ? amitriptyline (ELAVIL) 10 MG tablet TAKE 1 TABLET(10 MG) BY MOUTH AT BEDTIME 30 tablet 6   ? prenat.vits,gabriel,min-iron-folic (PRENATAL VITAMIN) Tab Take 1 tablet by mouth daily. 30 each 0   ? [DISCONTINUED] butalbital-acetaminophen-caffeine (FIORICET, ESGIC) -40 mg per tablet One tablet  as needed 1 tablet 0     No current facility-administered medications on file prior to visit.        Allergies:   No Known Allergies    OBJECTIVE:   Vitals:    06/29/18 1040   BP: 108/66   Patient Site: Right Arm   Patient Position: Sitting   Cuff Size: Adult Regular   Pulse: 70   Resp: 18   Temp: 98.4  F (36.9  C)   TempSrc: Oral   SpO2: 97%   Weight: 125 lb (56.7 kg)     Physical exam reveals a pleasant 28 y.o. female.   General Appearance:  Alert, cooperative, no distress, appears stated age. Afebrile.  Integument: Warm, dry, no rashes or lesions.  HEENT:  Head: Atraumatic, normocephalic. No cranial bone tenderness. Face nontraumatic.  Eyes: Conjunctiva clear. PERRL. EOMs intact without nystagumus.  There is a small external hordeolum noted to the left lower lid.  No pain with eye movement, no evidence of surrounding cellulitis.  Nose: nares patent. No erythema of nasal mucosa. No rhinorrhea.  Neck: Supple. No Cspine tenderness.  Respiratory: No distress. Lungs clear to ausculation bilaterally. No crackles, wheezes, rhonchi or stridor.  Cardiovascular:: Regular rate, regular rhythm. No murmurs, rubs, clicks or gallops. No obvious chest wall  deformities. Peripheral pulses 2+ bilaterally. No peripheral edema.  GI: Soft, nontender, normal bowel sounds. No masses, organomegaly, rigidity, or guarding.  Musculoskeletal: No swelling or erythema of extremities. Moves all extremities equally. Back: no Tspine or Lspine tenderness.   Neurologic: Alert & oriented to person, place and time. Normal tone. PERRL. Normal speech, no dysarthria.  CN II-XII grossly intact. Motor: RUE/LUE  strength 5/5 bilaterally, elbow flexion/extension 5/5 bilaterally, shoulder elevation 5/5 bilaterally. RLE/LLE knee flexion/extension 5/5 bilaterally, ankle plantar/dorsiflexion 5/5 bilaterally. No pronator drift. Sensory: intact distally  Gait: Normal, no assistive devices. Assistance of one. Psychomotor slowing (-). Abnormal Movements (-).Rapid alternating Movements intact. Finger nose finger intact. Heel to shin normal bilaterally.  Psych: Normal mood and affect.       RADIOLOGY    none  LABORATORY STUDIES    none    Administrations This Visit     ketorolac injection 30 mg (TORADOL)     Admin Date Action Dose Route Administered By             06/29/2018 Given 30 mg Intramuscular Renée Romero, NELLIE Schrader, CNP

## 2021-06-27 ENCOUNTER — HEALTH MAINTENANCE LETTER (OUTPATIENT)
Age: 31
End: 2021-06-27

## 2021-06-29 NOTE — PROGRESS NOTES
Progress Notes by Alyssia Scott MD at 8/17/2020  3:20 PM     Author: Alyssia Scott MD Service: -- Author Type: Physician    Filed: 8/17/2020  4:15 PM Encounter Date: 8/17/2020 Status: Signed    : Alyssia Scott MD (Physician)       FEMALE PREVENTATIVE EXAM    Assessment and Plan:       Lauren was seen today for annual exam.    Routine general medical examination at a health care facility  -     Pregnancy (Beta-hCG, Qual), Urine  We discussed healthy lifestyle, nutrition, cardiovascular risk reduction, self care, safety, sunscreen, seatbelt, and timing of cancer screening.  Health maintenance screening and immunizations reviewed with the patient.    Migraine without status migrainosus, not intractable, unspecified migraine type  refilled  -     SUMAtriptan (IMITREX) 50 MG tablet; TAKE 1 TABLET(50 MG) BY MOUTH 1 TIME AS NEEDED FOR MIGRAINE.  MG/ 24 HOUR    Anxiety  Instructed her to reduce lexapro to 10mg daily for one month and if still feeling well then 10mg every other day for one month then d/c      Next follow up:  yearly    Immunization Review  Adult Imm Review: No immunizations due today    I discussed the following with the patient:   Adult Healthy Living: Importance of regular exercise  Healthy nutrition    Subjective:   Chief Complaint: Lauren Santiago is an 30 y.o. female here for a preventative health visit.     HPI:  Doing well. Wondering if she can wean off lexapro.  Has been 3 years free of substance abuse    Healthy Habits  Are you taking a daily aspirin? No  Do you typically exercising at least 40 min, 3-4 times per week?  Yes  Do you usually eat at least 4 servings of fruit and vegetables a day, include whole grains and fiber and avoid regularly eating high fat foods? NO  Have you had an eye exam in the past two years? Yes  Do you see a dentist twice per year? Yes  Do you have any concerns regarding sleep? No    Safety Screen  If you own  "firearms, are they secured in a locked gun cabinet or with trigger locks? The patient does not own any firearms  Do you feel you are safe where you are living?: Yes (8/27/2019  2:56 PM)  Do you feel you are safe in your relationship(s)?: Yes (8/27/2019  2:56 PM)      Review of Systems:  Please see above.  The rest of the review of systems are negative for all systems.     Pap History:   Yes - updated in Problem List and Health Maintenance accordingly  Cancer Screening       Status Date      PAP SMEAR Next Due 1/8/2021      Done 1/8/2018 GYNECOLOGIC CYTOLOGY (PAP SMEAR)     Patient has more history with this topic...          Patient Care Team:  Alyssia Scott MD as PCP - General (Family Medicine)  Alyssia Scott MD as Assigned PCP        History     Reviewed By Date/Time Sections Reviewed    Niko Bruno CMA 8/17/2020  3:25 PM Tobacco            Objective:   Vital Signs:   Visit Vitals  /62   Pulse 80   Ht 5' 6\" (1.676 m)   Wt 141 lb (64 kg)   SpO2 99%   BMI 22.76 kg/m           PHYSICAL EXAM    Objective:    Physical Exam   Vitals:    08/17/20 1525   BP: 118/62   Pulse: 80   SpO2: 99%      Constitutional: Patient is oriented to person, place, and time. Patient appears well-developed and well-nourished. No distress.   Head: Normocephalic and atraumatic.   Right Ear: External ear normal. Normal TM  Left Ear: External ear normal. Normal TM  Nose: Nose normal.   Mouth/Throat: Oropharynx is clear and moist. No oropharyngeal exudate.   Eyes: Conjunctivae and EOM are normal. Pupils are equal, round, and reactive to light. Right eye exhibits no discharge. Left eye exhibits no discharge. No scleral icterus.   Neck: Neck supple. No JVD present. No tracheal deviation present. No thyromegaly present.   Cardiovascular: Normal rate, regular rhythm, normal heart sounds and intact distal pulses. No murmur heard.   Pulmonary/Chest: Effort normal and breath sounds normal. No stridor. No " respiratory distress. Patient has no wheezes, no rales, exhibits no tenderness.   Abdominal: Soft. Bowel sounds are normal. Patient exhibits no distension and no mass. There is no tenderness. There is no rebound and no guarding.   Lymphadenopathy:  Patient has no cervical adenopathy.   Neurological: Patient is alert and oriented to person, place, and time. Patient has normal reflexes. No cranial nerve deficit. Coordination normal.   Skin: Skin is warm and dry. No rash noted. Patient is not diaphoretic. No erythema. No pallor.   Normal breast         Medication List          Accurate as of August 17, 2020  4:13 PM. If you have any questions, ask your nurse or doctor.            CHANGE how you take these medications    escitalopram oxalate 20 MG tablet  Also known as:  LEXAPRO  INSTRUCTIONS:  Take 1 tablet (20 mg total) by mouth daily.  What changed:  Another medication with the same name was removed. Continue taking this medication, and follow the directions you see here.  Changed by:  Alyssia Beard MD           CONTINUE taking these medications    cholecalciferol (vitamin D3) 25 mcg (1,000 unit) capsule        Kurvelo (28) 0.15-0.03 mg per tablet  INSTRUCTIONS:  TAKE 1 TABLET BY MOUTH DAILY  Generic drug:  levonorgestrel-ethinyl estradiol        PNV cmb#95-ferrous fumarate-FA 28 mg iron- 800 mcg Tab  Also known as:  PRENATAL VITAMIN WITH MINERALS  INSTRUCTIONS:  TAKE 1 TABLET BY MOUTH ONCE DAILY        SUMAtriptan 50 MG tablet  Also known as:  IMITREX  INSTRUCTIONS:  TAKE 1 TABLET(50 MG) BY MOUTH 1 TIME AS NEEDED FOR MIGRAINE.  MG/ 24 HOUR           STOP taking these medications    butalbital-acetaminophen-caffeine -40 mg per tablet  Also known as:  FIORICET, ESGIC  Stopped by:  Alyssia Beard MD     traMADoL 50 mg tablet  Also known as:  ULTRAM  Stopped by:  Alyssia Beard MD           Where to Get Your Medications      These medications were sent to  [id number] - [place], [place] - [address] AT [facility] [address] [place] [address] [address], [facility] [id number]    Hours:  24-hours Phone:  [phone]     SUMAtriptan 50 MG tablet         Additional Screenings Completed Today:

## 2021-07-14 PROBLEM — Z32.01 POSITIVE URINE PREGNANCY TEST: Status: RESOLVED | Noted: 2017-04-11 | Resolved: 2018-01-08

## 2021-08-31 LAB
ALBUMIN UR-MCNC: NEGATIVE MG/DL
APPEARANCE UR: CLEAR
BILIRUB UR QL STRIP: NEGATIVE
COLOR UR AUTO: ABNORMAL
GLUCOSE UR STRIP-MCNC: NEGATIVE MG/DL
HCG UR QL: NEGATIVE
HGB UR QL STRIP: NEGATIVE
KETONES UR STRIP-MCNC: NEGATIVE MG/DL
LEUKOCYTE ESTERASE UR QL STRIP: NEGATIVE
NITRATE UR QL: NEGATIVE
PH UR STRIP: 7.5 [PH] (ref 5–7)
RBC URINE: 1 /HPF
SP GR UR STRIP: 1.01 (ref 1–1.03)
SQUAMOUS EPITHELIAL: 3 /HPF
UROBILINOGEN UR STRIP-MCNC: <2 MG/DL
WBC URINE: 2 /HPF

## 2021-08-31 PROCEDURE — 87491 CHLMYD TRACH DNA AMP PROBE: CPT | Performed by: EMERGENCY MEDICINE

## 2021-08-31 PROCEDURE — 81025 URINE PREGNANCY TEST: CPT | Performed by: EMERGENCY MEDICINE

## 2021-08-31 PROCEDURE — 99284 EMERGENCY DEPT VISIT MOD MDM: CPT

## 2021-08-31 PROCEDURE — 81001 URINALYSIS AUTO W/SCOPE: CPT | Performed by: EMERGENCY MEDICINE

## 2021-08-31 PROCEDURE — 87591 N.GONORRHOEAE DNA AMP PROB: CPT | Performed by: EMERGENCY MEDICINE

## 2021-08-31 ASSESSMENT — MIFFLIN-ST. JEOR: SCORE: 1428.03

## 2021-09-01 ENCOUNTER — HOSPITAL ENCOUNTER (EMERGENCY)
Facility: CLINIC | Age: 31
Discharge: HOME | End: 2021-09-01
Attending: EMERGENCY MEDICINE | Admitting: EMERGENCY MEDICINE
Payer: COMMERCIAL

## 2021-09-01 VITALS
DIASTOLIC BLOOD PRESSURE: 85 MMHG | OXYGEN SATURATION: 97 % | SYSTOLIC BLOOD PRESSURE: 141 MMHG | RESPIRATION RATE: 20 BRPM | HEIGHT: 67 IN | BODY MASS INDEX: 23.54 KG/M2 | HEART RATE: 70 BPM | TEMPERATURE: 98.1 F | WEIGHT: 150 LBS

## 2021-09-01 DIAGNOSIS — B37.31 CANDIDAL VULVOVAGINITIS: ICD-10-CM

## 2021-09-01 DIAGNOSIS — Z20.2 POSSIBLE EXPOSURE TO STD: ICD-10-CM
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
## 2021-09-01 LAB
ALBUMIN SERPL-MCNC: 4 G/DL (ref 3.5–5)
ALP SERPL-CCNC: 47 U/L (ref 45–120)
ALT SERPL W P-5'-P-CCNC: 12 U/L (ref 0–45)
ANION GAP SERPL CALCULATED.3IONS-SCNC: 10 MMOL/L (ref 5–18)
AST SERPL W P-5'-P-CCNC: 14 U/L (ref 0–40)
BASOPHILS # BLD AUTO: 0.1 10E3/UL (ref 0–0.2)
BASOPHILS NFR BLD AUTO: 1 %
BILIRUB SERPL-MCNC: 0.8 MG/DL (ref 0–1)
BUN SERPL-MCNC: 10 MG/DL (ref 8–22)
CALCIUM SERPL-MCNC: 9.1 MG/DL (ref 8.5–10.5)
CHLORIDE BLD-SCNC: 102 MMOL/L (ref 98–107)
CLUE CELLS: ABNORMAL
CO2 SERPL-SCNC: 29 MMOL/L (ref 22–31)
CREAT SERPL-MCNC: 0.84 MG/DL (ref 0.6–1.1)
EOSINOPHIL # BLD AUTO: 0.2 10E3/UL (ref 0–0.7)
EOSINOPHIL NFR BLD AUTO: 3 %
ERYTHROCYTE [DISTWIDTH] IN BLOOD BY AUTOMATED COUNT: 14.7 % (ref 10–15)
GFR SERPL CREATININE-BSD FRML MDRD: >90 ML/MIN/1.73M2
GLUCOSE BLD-MCNC: 96 MG/DL (ref 70–125)
HCT VFR BLD AUTO: 37.3 % (ref 35–47)
HGB BLD-MCNC: 12.2 G/DL (ref 11.7–15.7)
IMM GRANULOCYTES # BLD: 0 10E3/UL
IMM GRANULOCYTES NFR BLD: 0 %
LIPASE SERPL-CCNC: 13 U/L (ref 0–52)
LYMPHOCYTES # BLD AUTO: 3.4 10E3/UL (ref 0.8–5.3)
LYMPHOCYTES NFR BLD AUTO: 38 %
MCH RBC QN AUTO: 29.3 PG (ref 26.5–33)
MCHC RBC AUTO-ENTMCNC: 32.7 G/DL (ref 31.5–36.5)
MCV RBC AUTO: 90 FL (ref 78–100)
MONOCYTES # BLD AUTO: 0.8 10E3/UL (ref 0–1.3)
MONOCYTES NFR BLD AUTO: 9 %
NEUTROPHILS # BLD AUTO: 4.5 10E3/UL (ref 1.6–8.3)
NEUTROPHILS NFR BLD AUTO: 49 %
NRBC # BLD AUTO: 0 10E3/UL
NRBC BLD AUTO-RTO: 0 /100
PLATELET # BLD AUTO: 307 10E3/UL (ref 150–450)
POTASSIUM BLD-SCNC: 3.9 MMOL/L (ref 3.5–5)
PROT SERPL-MCNC: 7.6 G/DL (ref 6–8)
RBC # BLD AUTO: 4.16 10E6/UL (ref 3.8–5.2)
SODIUM SERPL-SCNC: 141 MMOL/L (ref 136–145)
TRICHOMONAS, WET PREP: ABNORMAL
WBC # BLD AUTO: 9.1 10E3/UL (ref 4–11)
WBC'S/HIGH POWER FIELD, WET PREP: ABNORMAL
YEAST, WET PREP: PRESENT

## 2021-09-01 PROCEDURE — 36415 COLL VENOUS BLD VENIPUNCTURE: CPT | Performed by: EMERGENCY MEDICINE

## 2021-09-01 PROCEDURE — 80053 COMPREHEN METABOLIC PANEL: CPT | Performed by: EMERGENCY MEDICINE

## 2021-09-01 PROCEDURE — 96372 THER/PROPH/DIAG INJ SC/IM: CPT | Performed by: EMERGENCY MEDICINE

## 2021-09-01 PROCEDURE — 85025 COMPLETE CBC W/AUTO DIFF WBC: CPT | Performed by: EMERGENCY MEDICINE

## 2021-09-01 PROCEDURE — 250N000009 HC RX 250: Performed by: EMERGENCY MEDICINE

## 2021-09-01 PROCEDURE — 83690 ASSAY OF LIPASE: CPT | Performed by: EMERGENCY MEDICINE

## 2021-09-01 PROCEDURE — 250N000011 HC RX IP 250 OP 636: Performed by: EMERGENCY MEDICINE

## 2021-09-01 PROCEDURE — 87210 SMEAR WET MOUNT SALINE/INK: CPT | Performed by: EMERGENCY MEDICINE

## 2021-09-01 PROCEDURE — 250N000013 HC RX MED GY IP 250 OP 250 PS 637: Performed by: EMERGENCY MEDICINE

## 2021-09-01 RX ORDER — FLUCONAZOLE 150 MG/1
TABLET ORAL
Qty: 2 TABLET | Refills: 0 | Status: SHIPPED | OUTPATIENT
Start: 2021-09-01 | End: 2021-09-04

## 2021-09-01 RX ORDER — ONDANSETRON 4 MG/1
4 TABLET, ORALLY DISINTEGRATING ORAL EVERY 8 HOURS PRN
Qty: 10 TABLET | Refills: 0 | Status: SHIPPED | OUTPATIENT
Start: 2021-09-01 | End: 2021-09-04

## 2021-09-01 RX ORDER — LEVONORGESTREL 1.5 MG/1
1.5 TABLET ORAL ONCE
Qty: 1 TABLET | Refills: 0 | Status: SHIPPED | OUTPATIENT
Start: 2021-09-01 | End: 2023-07-07

## 2021-09-01 RX ORDER — DOXYCYCLINE 100 MG/1
100 CAPSULE ORAL 2 TIMES DAILY
Qty: 28 CAPSULE | Refills: 0 | Status: SHIPPED | OUTPATIENT
Start: 2021-09-01 | End: 2021-09-15

## 2021-09-01 RX ORDER — DOXYCYCLINE HYCLATE 50 MG/1
100 CAPSULE ORAL ONCE
Status: COMPLETED | OUTPATIENT
Start: 2021-09-01 | End: 2021-09-01

## 2021-09-01 RX ADMIN — DOXYCYCLINE HYCLATE 100 MG: 50 CAPSULE ORAL at 01:46

## 2021-09-01 RX ADMIN — LIDOCAINE HYDROCHLORIDE 500 MG: 10 INJECTION, SOLUTION INFILTRATION; PERINEURAL at 01:48

## 2021-09-01 ASSESSMENT — ENCOUNTER SYMPTOMS
FEVER: 0
VOMITING: 1
CHILLS: 0
FLANK PAIN: 0
ABDOMINAL PAIN: 1
NAUSEA: 1
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
## 2021-09-02 LAB
C TRACH DNA SPEC QL NAA+PROBE: POSITIVE
N GONORRHOEA DNA SPEC QL NAA+PROBE: NEGATIVE

## 2021-10-17 ENCOUNTER — HEALTH MAINTENANCE LETTER (OUTPATIENT)
Age: 31
End: 2021-10-17

## 2021-12-13 ENCOUNTER — VIRTUAL VISIT (OUTPATIENT)
Dept: FAMILY MEDICINE | Facility: CLINIC | Age: 31
End: 2021-12-13
Payer: COMMERCIAL

## 2021-12-13 ENCOUNTER — TELEPHONE (OUTPATIENT)
Dept: FAMILY MEDICINE | Facility: CLINIC | Age: 31
End: 2021-12-13
Payer: COMMERCIAL

## 2021-12-13 DIAGNOSIS — U07.1 INFECTION DUE TO 2019 NOVEL CORONAVIRUS: Primary | ICD-10-CM

## 2021-12-13 PROCEDURE — 99213 OFFICE O/P EST LOW 20 MIN: CPT | Mod: 95 | Performed by: FAMILY MEDICINE

## 2021-12-13 NOTE — TELEPHONE ENCOUNTER
Please assist the patient with a video appointment with me. Ok to double book today before 2pm. Thank you.

## 2021-12-13 NOTE — PROGRESS NOTES
Lauren is a 31 year old who is being evaluated via a billable video visit.      How would you like to obtain your AVS? MyChart  If the video visit is dropped, the invitation should be resent by: Text to cell phone: [phone]  Will anyone else be joining your video visit? No      Video Start Time: 12:26 PM    Assessment & Plan     Infection due to 2019 novel coronavirus  For now, supportive cares.  Practice self-observation and remain alert for worsening symptomsh. Self isolate in the home until result is made available and your symptoms are better.     Alyssia Beard MD  [facility]    Subjective   Lauren is a 31 year old who presents for the following health issues     HPI     Positive home  covid yesterday  Symptoms started a week ago  Now with HA, cough, runny nose, myalgia, feverish, chills  Son and exhusband tested positive covid  Not vaccinated for covid or flu        Objective           Vitals:  No vitals were obtained today due to virtual visit.    Physical Exam     Constitutional: Patient is oriented to person, place, and time. Patient appears well-developed and well-nourished. No distress.   Head: Normocephalic and atraumatic.   Right Ear: External ear normal.   Left Ear: External ear normal.   Eyes: Conjunctivae and EOM are normal. Right eye exhibits no discharge. Left eye exhibits no discharge. No scleral icterus.   Neurological: Patient is alert and oriented to person, place, and time.  Skin: No rash noted. Patient is not diaphoretic. No erythema. No pallor.       Video-Visit Details    Type of service:  Video Visit    Video End Time:12:32 PM    Originating Location (pt. Location): Home    Distant Location (provider location):  [facility]     Platform used for Video Visit: [employer]

## 2021-12-13 NOTE — TELEPHONE ENCOUNTER
12-13-21  Reason for Call: Request for an order:    Order or referral being requested: COVID TEST    Date needed: as soon as possible    Has the patient been seen by the PCP for this problem? NO    Additional comments: pt called stated he is requesting a covid test  Symptoms: x3 days  Fever- hasnt taken her temp  Body aches  Chills  Headache  Sore throat  Muscle pain  Pt is fully vaccinated w/o booster  Pt was exposed to covid @ Work 1 week ago      Phone number Patient can be reached at:  Cell number on file:    Telephone Information:   Mobile [phone]       Best Time:  antyime    Can we leave a detailed message on this number?  YES    Call taken on 12/13/2021 at 8:14 AM by Talisha Gage

## 2022-01-04 ENCOUNTER — MYC MEDICAL ADVICE (OUTPATIENT)
Dept: FAMILY MEDICINE | Facility: CLINIC | Age: 32
End: 2022-01-04

## 2022-01-04 ENCOUNTER — VIRTUAL VISIT (OUTPATIENT)
Dept: FAMILY MEDICINE | Facility: CLINIC | Age: 32
End: 2022-01-04
Payer: COMMERCIAL

## 2022-01-04 DIAGNOSIS — F33.0 MILD EPISODE OF RECURRENT MAJOR DEPRESSIVE DISORDER (H): ICD-10-CM

## 2022-01-04 DIAGNOSIS — F41.9 ANXIETY: ICD-10-CM

## 2022-01-04 DIAGNOSIS — F33.0 MILD EPISODE OF RECURRENT MAJOR DEPRESSIVE DISORDER (H): Primary | ICD-10-CM

## 2022-01-04 PROCEDURE — 99214 OFFICE O/P EST MOD 30 MIN: CPT | Mod: GT | Performed by: FAMILY MEDICINE

## 2022-01-04 RX ORDER — BUPROPION HYDROCHLORIDE 150 MG/1
150 TABLET ORAL EVERY MORNING
Qty: 30 TABLET | Refills: 0 | Status: SHIPPED | OUTPATIENT
Start: 2022-01-04 | End: 2022-01-10

## 2022-01-04 ASSESSMENT — ANXIETY QUESTIONNAIRES
GAD7 TOTAL SCORE: 13
5. BEING SO RESTLESS THAT IT IS HARD TO SIT STILL: MORE THAN HALF THE DAYS
3. WORRYING TOO MUCH ABOUT DIFFERENT THINGS: MORE THAN HALF THE DAYS
6. BECOMING EASILY ANNOYED OR IRRITABLE: MORE THAN HALF THE DAYS
4. TROUBLE RELAXING: MORE THAN HALF THE DAYS
2. NOT BEING ABLE TO STOP OR CONTROL WORRYING: MORE THAN HALF THE DAYS
1. FEELING NERVOUS, ANXIOUS, OR ON EDGE: MORE THAN HALF THE DAYS
7. FEELING AFRAID AS IF SOMETHING AWFUL MIGHT HAPPEN: SEVERAL DAYS
IF YOU CHECKED OFF ANY PROBLEMS ON THIS QUESTIONNAIRE, HOW DIFFICULT HAVE THESE PROBLEMS MADE IT FOR YOU TO DO YOUR WORK, TAKE CARE OF THINGS AT HOME, OR GET ALONG WITH OTHER PEOPLE: SOMEWHAT DIFFICULT

## 2022-01-04 ASSESSMENT — PATIENT HEALTH QUESTIONNAIRE - PHQ9: SUM OF ALL RESPONSES TO PHQ QUESTIONS 1-9: 16

## 2022-01-04 NOTE — PROGRESS NOTES
Lauren is a 31 year old who is being evaluated via a billable video visit.      How would you like to obtain your AVS? MyChart    Will anyone else be joining your video visit? No      Video Start Time: 2:21 PM    Assessment & Plan     Mild episode of recurrent major depressive disorder (H)  Start Wellbutrin  Will refer for psychotherapy  Motivational interviewing was utilized today.  Modified cognitive behavioral therapy was performed with counseling on internal locus of control.  Discussed importance of self care, sleep, nutrition, hydration, exercise, and mindfulness  Follow-up in 1 month  - buPROPion (WELLBUTRIN XL) 150 MG 24 hr tablet  Dispense: 30 tablet; Refill: 0  - Adult Mental Health Referral    Anxiety  Continue with medication  Will refer for psychotherapy  Motivational interviewing was utilized today.  Modified cognitive behavioral therapy was performed with counseling on internal locus of control.  Discussed importance of self care, sleep, nutrition, hydration, exercise, and mindfulness  Follow-up in 1 month  - buPROPion (WELLBUTRIN XL) 150 MG 24 hr tablet  Dispense: 30 tablet; Refill: 0  - Adult Mental Health Referral      Alyssia Beard MD  [employer]   Lauren is a 31 year old who presents for the following health issues     HPI     Still dealing with divorce from her soon-to-be   She has moved out of the house and gotten an apartment close by to her children  Depression is worsening   Meditating a lot which is helping with her anxiety  Feeling safe  Has been off medication for many months (lexapro and wellbutrin)  No suicidal homicidal ideations  PHQ9=16, GAD7= 13          Objective           Vitals:  No vitals were obtained today due to virtual visit.    Physical Exam   Constitutional: Patient is oriented to person, place, and time. Patient appears well-developed and well-nourished. No distress.   Head: Normocephalic and atraumatic.    Right Ear: External ear normal.   Left Ear: External ear normal.   Eyes: Conjunctivae and EOM are normal. Right eye exhibits no discharge. Left eye exhibits no discharge. No scleral icterus.   Neurological: Patient is alert and oriented to person, place, and time.  Skin: No rash noted. Patient is not diaphoretic. No erythema. No pallor.    The patient has good eye contact.  No psychomotor retardation or stereotypical behaviors.  Speech was regular rate, regular rhythm, adequate responses.  Mood was stable and affect was congruent mood.  No suicidal or homicidal intent.  No hallucination.     Video-Visit Details    Type of service:  Video Visit    Video End Time:2:37 PM    Originating Location (pt. Location): Home    Distant Location (provider location):  [facility]     Platform used for Video Visit: Jennifer

## 2022-01-10 RX ORDER — BUPROPION HYDROCHLORIDE 150 MG/1
150 TABLET ORAL EVERY MORNING
Qty: 30 TABLET | Refills: 0 | Status: SHIPPED | OUTPATIENT
Start: 2022-01-10 | End: 2022-01-31

## 2022-01-10 RX ORDER — BUPROPION HYDROCHLORIDE 150 MG/1
150 TABLET ORAL EVERY MORNING
Qty: 30 TABLET | Refills: 0 | Status: CANCELLED | OUTPATIENT
Start: 2022-01-10

## 2022-01-20 ENCOUNTER — TELEPHONE (OUTPATIENT)
Dept: BEHAVIORAL HEALTH | Facility: CLINIC | Age: 32
End: 2022-01-20
Payer: COMMERCIAL

## 2022-01-24 ENCOUNTER — TELEPHONE (OUTPATIENT)
Dept: PSYCHOLOGY | Facility: CLINIC | Age: 32
End: 2022-01-24
Payer: COMMERCIAL

## 2022-01-25 ENCOUNTER — TELEPHONE (OUTPATIENT)
Dept: PSYCHOLOGY | Facility: CLINIC | Age: 32
End: 2022-01-25
Payer: COMMERCIAL

## 2022-01-25 NOTE — TELEPHONE ENCOUNTER
1/24/2022 intake assessment for therapy. Several links sent with no response. Emailed and phone message left with no response. Session failed

## 2022-01-25 NOTE — TELEPHONE ENCOUNTER
Intake assessment for therapy on 1/24. Several links sent. Phone message left and email sent with no response. Session failed

## 2022-01-31 DIAGNOSIS — F33.0 MILD EPISODE OF RECURRENT MAJOR DEPRESSIVE DISORDER (H): ICD-10-CM

## 2022-01-31 DIAGNOSIS — F41.9 ANXIETY: ICD-10-CM

## 2022-01-31 RX ORDER — BUPROPION HYDROCHLORIDE 150 MG/1
150 TABLET ORAL 2 TIMES DAILY
Qty: 60 TABLET | Refills: 0 | Status: SHIPPED | OUTPATIENT
Start: 2022-01-31 | End: 2022-03-01

## 2022-02-01 RX ORDER — BUPROPION HYDROCHLORIDE 150 MG/1
TABLET ORAL
Qty: 30 TABLET | Refills: 0 | OUTPATIENT
Start: 2022-02-01

## 2022-02-02 NOTE — TELEPHONE ENCOUNTER
"Duplicate.    PHQ-9 and GAD7 Scores  8/17/2020 11/18/2020 1/4/2022    PHQ-9 Total Score 1 17 16   DAE-7 Total Score 7 21 -    8/17/2020 11/18/2020 1/4/2022     Last Written Prescription Date:  01/31/2022  Last Fill Quantity: 60,  # refills: 0   Last office visit provider:  01/04/2022 with Dr Hay.     Requested Prescriptions   Pending Prescriptions Disp Refills     buPROPion (WELLBUTRIN XL) 150 MG 24 hr tablet [Pharmacy Med Name: BUPROPION XL 150MG TABLETS (24 H)] 30 tablet 0     Sig: TAKE 1 TABLET(150 MG) BY MOUTH EVERY MORNING       SSRIs Protocol Failed - 1/31/2022  3:34 AM        Failed - PHQ-9 score less than 5 in past 6 months     Please review last PHQ-9 score.           Passed - Medication is Bupropion     If the medication is Bupropion (Wellbutrin), and the patient is taking for smoking cessation; OK to refill.          Passed - Medication is active on med list        Passed - Patient is age 18 or older        Passed - No active pregnancy on record        Passed - No positive pregnancy test in last 12 months        Passed - Recent (6 mo) or future (30 days) visit within the authorizing provider's specialty     Patient had office visit in the last 6 months or has a visit in the next 30 days with authorizing provider or within the authorizing provider's specialty.  See \"Patient Info\" tab in inbasket, or \"Choose Columns\" in Meds & Orders section of the refill encounter.                 Thais Phillips 02/01/22 8:13 PM  "

## 2022-02-15 ENCOUNTER — VIRTUAL VISIT (OUTPATIENT)
Dept: FAMILY MEDICINE | Facility: CLINIC | Age: 32
End: 2022-02-15
Payer: COMMERCIAL

## 2022-02-15 DIAGNOSIS — F33.0 MILD EPISODE OF RECURRENT MAJOR DEPRESSIVE DISORDER (H): ICD-10-CM

## 2022-02-15 DIAGNOSIS — G43.719 INTRACTABLE CHRONIC MIGRAINE WITHOUT AURA AND WITHOUT STATUS MIGRAINOSUS: Primary | ICD-10-CM

## 2022-02-15 DIAGNOSIS — F41.9 ANXIETY: ICD-10-CM

## 2022-02-15 PROCEDURE — 99214 OFFICE O/P EST MOD 30 MIN: CPT | Mod: GT | Performed by: FAMILY MEDICINE

## 2022-02-15 RX ORDER — GABAPENTIN 100 MG/1
100 CAPSULE ORAL 3 TIMES DAILY
Qty: 90 CAPSULE | Refills: 0 | Status: SHIPPED | OUTPATIENT
Start: 2022-02-15 | End: 2022-02-28

## 2022-02-15 NOTE — LETTER
February 15, 2022      Lauren Santiago  [address]  [place] [address]        To Whom It May Concern:    Lauren Santiago  was seen today.  Please excuse her from work for today.      Sincerely,        Alyssia Beard MD

## 2022-02-15 NOTE — PROGRESS NOTES
Lauren is a 31 year old who is being evaluated via a billable video visit.      How would you like to obtain your AVS? MyChart    Will anyone else be joining your video visit? No      Video Start Time: 2:19 PM    Assessment & Plan     Intractable chronic migraine without aura and without status migrainosus  Start gabapentin  Motivational interviewing was utilized today.  Modified cognitive behavioral therapy was performed with counseling on internal locus of control.  Discussed importance of self care, sleep, nutrition, hydration, exercise, and mindfulness  Follow-up in 1 month  - gabapentin (NEURONTIN) 100 MG capsule  Dispense: 90 capsule; Refill: 0    Anxiety  Start gabapentin  Motivational interviewing was utilized today.  Modified cognitive behavioral therapy was performed with counseling on internal locus of control.  Discussed importance of self care, sleep, nutrition, hydration, exercise, and mindfulness  Follow-up in 1 month  - gabapentin (NEURONTIN) 100 MG capsule  Dispense: 90 capsule; Refill: 0    Mild episode of recurrent major depressive disorder (H)  Continue with wellbutrin 150mg BID       Alyssia Beard MD  [facility]    Subjective   Lauren is a 31 year old who presents for the following health issues     HPI     Headache flare up today  We discussed gabapentin initially for headache but she was hesitant  Wondering about it now  H/o chr migraine    wellbutrin 300mg is working well for depression  anxiety same   Feeling safe        Objective           Vitals:  No vitals were obtained today due to virtual visit.    Physical Exam   Alert oriented, NAD    Video-Visit Details    Type of service:  Video Visit    Video End Time:2:25 PM    Originating Location (pt. Location): Home    Distant Location (provider location):  [facility]     Platform used for Video Visit: [employer]

## 2022-02-24 ENCOUNTER — MYC MEDICAL ADVICE (OUTPATIENT)
Dept: FAMILY MEDICINE | Facility: CLINIC | Age: 32
End: 2022-02-24
Payer: COMMERCIAL

## 2022-02-24 DIAGNOSIS — F41.9 ANXIETY: ICD-10-CM

## 2022-02-24 DIAGNOSIS — G43.719 INTRACTABLE CHRONIC MIGRAINE WITHOUT AURA AND WITHOUT STATUS MIGRAINOSUS: ICD-10-CM

## 2022-02-28 DIAGNOSIS — G43.709 CHRONIC MIGRAINE WITHOUT AURA WITHOUT STATUS MIGRAINOSUS, NOT INTRACTABLE: Primary | ICD-10-CM

## 2022-02-28 RX ORDER — GABAPENTIN 300 MG/1
300 CAPSULE ORAL 3 TIMES DAILY
Qty: 90 CAPSULE | Refills: 11 | Status: SHIPPED | OUTPATIENT
Start: 2022-02-28 | End: 2022-04-06

## 2022-02-28 RX ORDER — RIZATRIPTAN BENZOATE 10 MG/1
10 TABLET ORAL
Qty: 30 TABLET | Refills: 11 | Status: SHIPPED | OUTPATIENT
Start: 2022-02-28 | End: 2022-04-06

## 2022-03-01 ENCOUNTER — MYC REFILL (OUTPATIENT)
Dept: FAMILY MEDICINE | Facility: CLINIC | Age: 32
End: 2022-03-01
Payer: COMMERCIAL

## 2022-03-01 DIAGNOSIS — F41.9 ANXIETY: ICD-10-CM

## 2022-03-01 DIAGNOSIS — F33.0 MILD EPISODE OF RECURRENT MAJOR DEPRESSIVE DISORDER (H): ICD-10-CM

## 2022-03-02 RX ORDER — RIZATRIPTAN BENZOATE 5 MG/1
TABLET, ORALLY DISINTEGRATING ORAL
Qty: 30 TABLET | Refills: 0 | Status: SHIPPED | OUTPATIENT
Start: 2022-03-02 | End: 2023-07-07

## 2022-03-02 RX ORDER — BUTALBITAL, ACETAMINOPHEN AND CAFFEINE 50; 325; 40 MG/1; MG/1; MG/1
TABLET ORAL
Qty: 20 TABLET | Refills: 0 | Status: SHIPPED | OUTPATIENT
Start: 2022-03-02 | End: 2022-04-06

## 2022-03-02 NOTE — TELEPHONE ENCOUNTER
Routing refill request to provider for review/approval because:  Controlled substance (Butalbital/Acetamin/caffeine).  Serotonin Agonist (Maxalt) also requires provider review.  butalbital-acetaminophen-caffeine (FIORICET, ESGIC) -40 mg per [id number]/7/2021--Sig - Route: Take 1-2 tablets by mouth every 6 (six) hours as needed for headaches. - OralSent to pharmacy as: butalbital-acetaminophen-caffeine 50 mg-325 mg-40 mg tablet (FIORICET, ESGIC)E-Prescribing Status: Receipt confirmed by pharmacy (1/7/2021  2:03 PM CST)     Last Written FIORICET Date:  1/7/2021  Last Fill Quantity: 20,  # refills: 0     rizatriptan (MAXALT-MLT) 5 MG disintegrating tablet 30 tablet 0 1/7/2021  --   Sig - Route: Take 1 tablet (5 mg total) by mouth as needed for migraine. Max 30mg/24hrMay repeat in 2 hours if needed - Oral   Sent to pharmacy as: rizatriptan 5 mg disintegrating tablet (MAXALT-MLT)   E-Prescribing Status: Receipt confirmed by pharmacy (1/7/2021  2:03 PM CST)     Last Written RIZATRIPTAN-MLT 5 mg Date:  1/7/2021  Last Fill Quantity:30  # refills: 0    Last office visit provider:  1/4/2022       Requested Prescriptions   Pending Prescriptions Disp Refills     butalbital-acetaminophen-caffeine (ESGIC) -40 MG tablet [Pharmacy Med Name: BUTALBITAL/ACETAMINOPHEN/CAFF TABS] 20 tablet      Sig: TAKE 1 TO 2 TABLETS BY MOUTH EVERY 6 HOURS AS NEEDED FOR HEADACHES       There is no refill protocol information for this order        rizatriptan (MAXALT-MLT) 5 MG ODT [Pharmacy Med Name: RIZATRIPTAN ODT 5MG TABLETS] 30 tablet      Sig: DISSOLVE 1 TABLET BY MOUTH AS NEEDED FOR MIGRAINE. MAX 30 MG PER 24 HOURS. MAY REPEAT IN 2 HOURS IF NEEDED.       Serotonin Agonists Failed - 2/28/2022  9:00 AM        Failed - Blood pressure under 140/90 in past 12 months     BP Readings from Last 3 Encounters:   08/31/21 (!) 141/85   01/07/21 120/80   08/17/20 118/62                 Failed - Serotonin Agonist request needs review.      "Please review patient's record. If patient has had 8 or more treatments in the past month, please forward to provider.          Failed - Medication is active on med list        Passed - Recent (12 mo) or future (30 days) visit within the authorizing provider's specialty     Patient has had an office visit with the authorizing provider or a provider within the authorizing providers department within the previous 12 mos or has a future within next 30 days. See \"Patient Info\" tab in inbasket, or \"Choose Columns\" in Meds & Orders section of the refill encounter.              Passed - Patient is age 18 or older        Passed - No active pregnancy on record        Passed - No positive pregnancy test in past 12 months             Rosy Laguna RN 03/02/22 9:11 AM  "

## 2022-03-03 RX ORDER — BUPROPION HYDROCHLORIDE 150 MG/1
150 TABLET ORAL 2 TIMES DAILY
Qty: 60 TABLET | Refills: 0 | OUTPATIENT
Start: 2022-03-03

## 2022-03-03 NOTE — TELEPHONE ENCOUNTER
Denied Rx, request already responded to by other means.     Valentina Preston RN, BSN Nurse Triage Advisor 10:06 AM 3/3/2022

## 2022-03-14 DIAGNOSIS — G43.719 INTRACTABLE CHRONIC MIGRAINE WITHOUT AURA AND WITHOUT STATUS MIGRAINOSUS: ICD-10-CM

## 2022-03-14 DIAGNOSIS — F41.9 ANXIETY: ICD-10-CM

## 2022-03-14 RX ORDER — GABAPENTIN 100 MG/1
CAPSULE ORAL
Qty: 90 CAPSULE | Refills: 0 | OUTPATIENT
Start: 2022-03-14

## 2022-03-14 NOTE — TELEPHONE ENCOUNTER
Medication refill request declined: should have refills on file - dose changed     Disp Refills Start End ALINA   gabapentin (NEURONTIN) 300 MG capsule 90 capsule 11 2/28/2022  No   Sig - Route: Take 1 capsule (300 mg) by mouth 3 times daily - Oral   Sent to pharmacy as: Gabapentin 300 MG Oral Capsule (NEURONTIN)   Class: E-Prescribe   Order: [id number]   E-Prescribing Status: Receipt confirmed by pharmacy (2/28/2022  2:49 PM CST)       Carol Jimenez RN 03/14/22 10:55 AM

## 2022-04-06 ENCOUNTER — MYC MEDICAL ADVICE (OUTPATIENT)
Dept: FAMILY MEDICINE | Facility: CLINIC | Age: 32
End: 2022-04-06

## 2022-04-06 ENCOUNTER — OFFICE VISIT (OUTPATIENT)
Dept: FAMILY MEDICINE | Facility: CLINIC | Age: 32
End: 2022-04-06
Payer: COMMERCIAL

## 2022-04-06 VITALS
HEART RATE: 90 BPM | OXYGEN SATURATION: 98 % | SYSTOLIC BLOOD PRESSURE: 132 MMHG | DIASTOLIC BLOOD PRESSURE: 70 MMHG | WEIGHT: 122 LBS | BODY MASS INDEX: 19.11 KG/M2

## 2022-04-06 DIAGNOSIS — F33.1 MODERATE EPISODE OF RECURRENT MAJOR DEPRESSIVE DISORDER (H): Primary | ICD-10-CM

## 2022-04-06 DIAGNOSIS — G43.709 CHRONIC MIGRAINE WITHOUT AURA WITHOUT STATUS MIGRAINOSUS, NOT INTRACTABLE: ICD-10-CM

## 2022-04-06 DIAGNOSIS — G89.29 CHRONIC NONINTRACTABLE HEADACHE, UNSPECIFIED HEADACHE TYPE: ICD-10-CM

## 2022-04-06 DIAGNOSIS — R51.9 NONINTRACTABLE HEADACHE, UNSPECIFIED CHRONICITY PATTERN, UNSPECIFIED HEADACHE TYPE: Primary | ICD-10-CM

## 2022-04-06 DIAGNOSIS — R51.9 CHRONIC NONINTRACTABLE HEADACHE, UNSPECIFIED HEADACHE TYPE: ICD-10-CM

## 2022-04-06 DIAGNOSIS — F41.9 ANXIETY: ICD-10-CM

## 2022-04-06 PROCEDURE — 99214 OFFICE O/P EST MOD 30 MIN: CPT | Performed by: FAMILY MEDICINE

## 2022-04-06 RX ORDER — ESCITALOPRAM OXALATE 10 MG/1
10 TABLET ORAL DAILY
Qty: 30 TABLET | Refills: 11 | Status: SHIPPED | OUTPATIENT
Start: 2022-04-06 | End: 2023-07-07

## 2022-04-06 RX ORDER — LORAZEPAM 0.5 MG/1
0.5 TABLET ORAL 2 TIMES DAILY PRN
Qty: 5 TABLET | Refills: 0 | Status: SHIPPED | OUTPATIENT
Start: 2022-04-06 | End: 2023-07-07

## 2022-04-06 ASSESSMENT — ANXIETY QUESTIONNAIRES
GAD7 TOTAL SCORE: 21
GAD7 TOTAL SCORE: 21
3. WORRYING TOO MUCH ABOUT DIFFERENT THINGS: NEARLY EVERY DAY
5. BEING SO RESTLESS THAT IT IS HARD TO SIT STILL: NEARLY EVERY DAY
GAD7 TOTAL SCORE: 21
7. FEELING AFRAID AS IF SOMETHING AWFUL MIGHT HAPPEN: NEARLY EVERY DAY
6. BECOMING EASILY ANNOYED OR IRRITABLE: NEARLY EVERY DAY
2. NOT BEING ABLE TO STOP OR CONTROL WORRYING: NEARLY EVERY DAY
1. FEELING NERVOUS, ANXIOUS, OR ON EDGE: NEARLY EVERY DAY
7. FEELING AFRAID AS IF SOMETHING AWFUL MIGHT HAPPEN: NEARLY EVERY DAY
4. TROUBLE RELAXING: NEARLY EVERY DAY

## 2022-04-06 ASSESSMENT — PATIENT HEALTH QUESTIONNAIRE - PHQ9
10. IF YOU CHECKED OFF ANY PROBLEMS, HOW DIFFICULT HAVE THESE PROBLEMS MADE IT FOR YOU TO DO YOUR WORK, TAKE CARE OF THINGS AT HOME, OR GET ALONG WITH OTHER PEOPLE: VERY DIFFICULT
SUM OF ALL RESPONSES TO PHQ QUESTIONS 1-9: 13
SUM OF ALL RESPONSES TO PHQ QUESTIONS 1-9: 13

## 2022-04-06 NOTE — TELEPHONE ENCOUNTER
Last visit addressing this medication:02/15/2022 VV  Follow up plan not listed  Last refill on03/02/022, quantity 20

## 2022-04-06 NOTE — PATIENT INSTRUCTIONS
Patient Education     Depression  Depression is one of the most common mental health problems today. It is not just a state of unhappiness or sadness. It is a true disease. The cause seems to be linked to a drop in chemicals that transmit signals in the brain. Having a family history of depression, alcoholism, or suicide increases the risk. Chronic illness, chronic pain, migraine headaches, and high emotional stress also increase the risk.   Depression is something we tend to recognize in others. But we may have a hard time seeing it in ourselves. It can show in many physical and emotional ways:     Loss of appetite    Overeating    Not being able to sleep    Sleeping too much    Excessive tiredness not linked to physical exertion    Restlessness or irritability    Slowness of movement or speech    Feeling depressed or withdrawn    Loss of interest in things you once enjoyed    Trouble concentrating, remembering, or making decisions    Thoughts of harming or killing oneself, or thoughts that life is not worth living    Low self-esteem  The treatment for depression may include both medicine and psychotherapy. Antidepressants can reduce suffering. They can also improve the ability to function during the depressed period. Therapy can offer emotional support. It can also help you understand emotional factors that may be causing the depression.   Home care    Ongoing care and support help people manage this disease. Find a healthcare provider and therapist who meet your needs. Seek help when you feel like you may be getting ill.    Be kind to yourself. Make it a point to do things that you enjoy (gardening, walking in nature, going to a movie). Reward yourself for small successes.    Once you start your medicine, expect a slow decrease in your symptoms. Depression will lift gradually, not right away. Ask your healthcare provider how long it will take for the medicine to start working.    Take care of your physical body.  Eat a balanced diet (low in saturated fat and high in fruits and vegetables). Exercise at least 3 times a week for 30 minutes. Even mild to moderate exercise (like brisk walking) can make you feel better.    Don't make major decisions, such as a job change, a divorce, or a marriage until you feel better.    Don't drink alcohol. It can make depression worse.    Take medicine as prescribed. Don't stop your medicine or adjust the dose unless you talk with your healthcare provider.    Don't share your medicine or use someone else's medicine.    Tell all your healthcare providers about all the prescription and over-the-counter medicines, vitamins, and supplements you take. Certain supplements interact with medicines. They can cause dangerous side effects. Ask your pharmacist about medicine interactions when you have questions.    Talk with your family and trusted friends about your feelings and thoughts. Ask them to help you notice behavior changes early. You can then get help and, if needed, your medicine can be adjusted.    Talk with your healthcare provider if you are not getting better. He or she may change your medicine or have you try another treatment.    Follow-up care  Follow up with your healthcare provider as advised.   Call 911  Call 911 if you:     Have suicidal thoughts, a suicide plan, and the means to carry out the plan    Have serious thoughts of hurting someone else    Have trouble breathing    Are very confused    Feel very drowsy or have trouble awakening    Faint or lose consciousness    Have new chest pain that becomes more severe, lasts longer, or spreads into your shoulder, arm, neck, jaw, or back  When to seek medical advice  Call your healthcare provider right away if any of these happen:    Worsening of your symptoms    Feeling extreme depression, fear, anxiety, or anger toward yourself or others    Feeling out of control    Feeling that you may try to harm yourself or another    Hearing voices  that others do not hear    Seeing things that others do not see    Not sleeping or eating for 3 days in a row    Having friends or family express concern over your behavior and ask you to seek help  [employer] last reviewed this educational content on 7/1/2020 2000-2021 [employer]. All rights reserved. This information is not intended as a substitute for professional medical care. Always follow your healthcare professional's instructions.

## 2022-04-06 NOTE — PROGRESS NOTES
Answers for HPI/ROS submitted by the patient on 4/6/2022  If you checked off any problems, how difficult have these problems made it for you to do your work, take care of things at home, or get along with other people?: Very difficult  PHQ9 TOTAL SCORE: 13  DAE 7 TOTAL SCORE: 21  Headache Symptoms are: worsened  How often are you getting headaches or migraines? : Everyday  Are you able to do normal daily activities when you have a migraine?: No  Migraine Rescue/Relief Medications:: Ibuprofen (Advil, Motrin), Tylenol, Excedrin, Maxalt  Effectiveness of rescue/relief medications:: The relief is inconsistent  Migraine Preventative Medications:: Neurontin  ER or UC Visits:: 1 time  Depression/Anxiety: Depression & Anxiety  Status since last visit:: medium  Anxiety since last: : worse  Other associated symptoms of depression:: No  Other associated symotome: : Yes  Significant life event: : relationship concerns, job concerns, financial concerns, housing concerns, health concerns  Anxious:: Yes  Current substance use:: No  How many servings of fruits and vegetables do you eat daily?: 2-3  On average, how many sweetened beverages do you drink each day (Examples: soda, juice, sweet tea, etc.  Do NOT count diet or artificially sweetened beverages)?: 1  How many minutes a day do you exercise enough to make your heart beat faster?: 60 or more  How many days a week do you exercise enough to make your heart beat faster?: 6  How many days per week do you miss taking your medication?: 0  What is the reason for your visit today?: Establish new primary

## 2022-04-06 NOTE — PROGRESS NOTES
"  Assessment & Plan     (F33.1) Moderate episode of recurrent major depressive disorder (H)  (primary encounter diagnosis)  Comment: recurrent major depression since her adulthood. Tried Several medication wellbutrin, lexpro etc and not help. Recently stress from moving, changing job and divorce. She has two children who maker her happy. Denies suicide and homicide.    Plan: escitalopram (LEXAPRO) 10 MG tablet, Adult         Mental Health  Referral, Adult Mental         Health  Referral, Adult Neurology          Referral        Side effect of medication addressed. Advise to take it as long as no side effect.   Strongly advise to call 911 if she experience mental crisis.     (F41.9) Anxiety  Comment: anxiety and panic attack: shaking, sweat, syncope etc. She tried hydroxyzine and not help. She asks for short term of lorazepam and hopefully she will not use it. She state she has sober of drug for year.   Plan: LORazepam (ATIVAN) 0.5 MG tablet        Side effect of control substance of benzodiazepine address: addiction, abuse and over dose to cause death. She understands.     (R51.9,  G89.29) Chronic nonintractable headache, unspecified headache type  Comment: chronic stable headache and  several medication do not help. No headache injury. Reviewed the chart  Er visit at 4/1 \"Your blood tests were normal. You had a head CT during your last visit that was normal. Please follow-up with Neurology in clinic  Plan: follow-up with neurologist.      Return if symptoms worsen or fail to improve.    Giselle Lockett MD  [facility]    Benjamin Aguilar is a 32 year old who presents for the following health issues     HPI     Pt has major depression recurrent for years since her adulthood. She took lexapro years ago and not much help and stopped one year ago when she got better. She was Rxed wellbutrin and not help either. She had two episodes of likely panic attack and she is " "worried about wellbutrin induced seizure and the second episode she already stopped wellbutrin.     She has recent stress: moving the house, changing to a new job, she has an interview after the office visit, . She has two children 4 and 8 years old, when children are with her she feels better. Denies suicide and homicide. Non smoker and alcohol. She state she used drugs when she was very young     Reviewed the history of drug\" Drug use: In treatment currently (4/2017); wants help geting off of Suboxone and Valium; \"  She she has been sober for long time.     Chronic headache. Tried several Medication and not help. No head injury. She has photophobia and nausea when she has headache.    Review of Systems   Constitutional, HEENT, cardiovascular, pulmonary, gi and gu systems are negative, except as otherwise noted.      Objective    /70   Pulse 90   Wt 55.3 kg (122 lb)   LMP 04/03/2022 (Approximate)   SpO2 98%   Breastfeeding No   BMI 19.11 kg/m    Body mass index is 19.11 kg/m .  Physical Exam   GENERAL: healthy, alert and no distress  PSYCH: mentation appears normal, affect normal/bright. Denies suicide and homicide.            "

## 2022-04-07 RX ORDER — BUTALBITAL, ACETAMINOPHEN AND CAFFEINE 50; 325; 40 MG/1; MG/1; MG/1
TABLET ORAL
Qty: 20 TABLET | Refills: 0 | Status: SHIPPED | OUTPATIENT
Start: 2022-04-07 | End: 2023-07-07

## 2022-04-07 RX ORDER — GABAPENTIN 300 MG/1
300 CAPSULE ORAL 3 TIMES DAILY
Qty: 90 CAPSULE | Refills: 10 | Status: SHIPPED | OUTPATIENT
Start: 2022-04-07 | End: 2023-07-07

## 2022-04-07 ASSESSMENT — PATIENT HEALTH QUESTIONNAIRE - PHQ9: SUM OF ALL RESPONSES TO PHQ QUESTIONS 1-9: 13

## 2022-04-07 ASSESSMENT — ANXIETY QUESTIONNAIRES: GAD7 TOTAL SCORE: 21

## 2022-10-01 ENCOUNTER — HEALTH MAINTENANCE LETTER (OUTPATIENT)
Age: 32
End: 2022-10-01

## 2022-12-15 ENCOUNTER — TRANSFERRED RECORDS (OUTPATIENT)
Dept: HEALTH INFORMATION MANAGEMENT | Facility: CLINIC | Age: 32
End: 2022-12-15

## 2023-02-05 ENCOUNTER — HEALTH MAINTENANCE LETTER (OUTPATIENT)
Age: 33
End: 2023-02-05

## 2023-04-07 ENCOUNTER — HOSPITAL ENCOUNTER (OUTPATIENT)
Dept: BEHAVIORAL HEALTH | Facility: CLINIC | Age: 33
Discharge: HOME | End: 2023-04-07
Attending: FAMILY MEDICINE | Admitting: FAMILY MEDICINE
Payer: COMMERCIAL

## 2023-04-07 VITALS — BODY MASS INDEX: 24.25 KG/M2 | HEIGHT: 68 IN | WEIGHT: 160 LBS

## 2023-04-07 DIAGNOSIS — F15.20 AMPHETAMINE USE DISORDER, SEVERE (H): ICD-10-CM

## 2023-04-07 PROCEDURE — H0001 ALCOHOL AND/OR DRUG ASSESS: HCPCS | Mod: TEL,95

## 2023-04-07 RX ORDER — RISPERIDONE 2 MG/1
2 TABLET ORAL 2 TIMES DAILY
COMMUNITY
End: 2023-07-07

## 2023-04-07 RX ORDER — MIRTAZAPINE 7.5 MG/1
7.5 TABLET, FILM COATED ORAL AT BEDTIME
COMMUNITY
End: 2023-07-07

## 2023-04-07 RX ORDER — BUPRENORPHINE AND NALOXONE 8; 2 MG/1; MG/1
1 FILM, SOLUBLE BUCCAL; SUBLINGUAL DAILY
COMMUNITY
End: 2024-08-24

## 2023-04-07 ASSESSMENT — COLUMBIA-SUICIDE SEVERITY RATING SCALE - C-SSRS
1. IN THE PAST MONTH, HAVE YOU WISHED YOU WERE DEAD OR WISHED YOU COULD GO TO SLEEP AND NOT WAKE UP?: NO
1. HAVE YOU WISHED YOU WERE DEAD OR WISHED YOU COULD GO TO SLEEP AND NOT WAKE UP?: YES
2. HAVE YOU ACTUALLY HAD ANY THOUGHTS OF KILLING YOURSELF?: NO

## 2023-04-07 ASSESSMENT — PATIENT HEALTH QUESTIONNAIRE - PHQ9
SUM OF ALL RESPONSES TO PHQ QUESTIONS 1-9: 9
SUM OF ALL RESPONSES TO PHQ QUESTIONS 1-9: 9
10. IF YOU CHECKED OFF ANY PROBLEMS, HOW DIFFICULT HAVE THESE PROBLEMS MADE IT FOR YOU TO DO YOUR WORK, TAKE CARE OF THINGS AT HOME, OR GET ALONG WITH OTHER PEOPLE: SOMEWHAT DIFFICULT

## 2023-04-07 NOTE — PROGRESS NOTES
"    [facility]      PATIENT'S NAME: Lauren Santiago  PREFERRED NAME: Lauren  PRONOUNS: she/her/hers  MRN: [id number]  : 1990  [facility] [address] [address]  [facility]  [id number]  DATE OF SERVICE: 23  START TIME: 10:30 a.m.  END TIME: 11:45 a.m.  PREFERRED PHONE: [phone]  May we leave a program related message: Yes  SERVICE MODALITY:  Phone Visit:      Provider verified identity through the following two step process.  Patient provided:  Patient  and Patient's last 4 digits of SSN    Telephone Visit: The patient's condition can be safely assessed and treated via synchronous audio telemedicine encounter.      Reason for Audio Telemedicine Visit: Patient has requested telehealth visit    Originating Site (Patient Location): Transitional Housing.    Distant Site (Provider Location): Provider Remote Setting- Home Office    Consent:  The patient/guardian has verbally consented to:     1. The potential risks and benefits of telemedicine (telephone visit) versus in person care;    The patient has been notified of the following:      \"We have found that certain health care needs can be provided without the need for a face to face visit.  This service lets us provide the care you need with a phone conversation.       I will have full access to your [facility] medical record during this entire phone call.   I will be taking notes for your medical record.      Since this is like an office visit, we will bill your insurance company for this service.       There are potential benefits and risks of telephone visits (e.g. limits to patient confidentiality) that differ from in-person visits.?Confidentiality still applies for telephone services, and nobody will record the visit.  It is important to be in a quiet, private space that is free of distractions (including cell phone or other devices) during the visit.??      If during " "the course of the call I believe a telephone visit is not appropriate, you will not be charged for this service\"     Consent has been obtained for this service by care team member: Yes     The pt also gives verbal consent for SABRINA to and from:      Herself, Tel: [phone], [email or web address] [email or web address]    Her Emergency Contact, Quang Sung, (father), Tel: [phone]    [address] [id number] [phone]  FAX: [phone]    UNIVERSAL ADULT Substance Use Disorder DIAGNOSTIC ASSESSMENT    Identifying Information:  Patient is a 33 year old,   individual.  Patient was referred for an assessment by self.  Patient attended the session alone.    Chief Complaint:   The reason for seeking services at this time is: \"Chemical health.  I just got out of custodial March 14th and I have a history of substance abuse and I think I need more support.  I had a felony that got suspended.  I just had an assessment and had gone to [place] for outpatient, but that was not a good fit for me.  People were using drugs.  I am looking for a 3 month program.\"The problem(s) began 04/07/23.    Patient has attempted to resolve these concerns in the past through treatment, therapy, medication..  Patient does not appear to be in severe withdrawal, an imminent safety risk to self or others, or requiring immediate medical attention and may proceed with the assessment interview.    Social/Family History:  Patient reported they grew up in [place] [facility].  They were raised by biological father.  Parents  / .  \"I was 4-yrs-old.  I have a brother who just passed and two sister.\" Patient reported that their childhood was \"pretty good, a little chaotic because we moved a lot\".   Patient described their current relationships with family of origin as \"really good with sisters and Dad, don't see my Mom.\"      The patient describes their cultural background as .  Cultural influences and " "impact on patient's life structure, values, norms, and healthcare: Dusty.  Contextual influences on patient's health include: Contextual Factors: Individual Factors The pt reprots she has co-occurring MH and MENA with a recent DX of Schizophrenia.  She reports having some legal issues and is currently residing at an IRTS facility.  She reports experiencing \"a lot of trauma\" in her life., Family Factors some family HX of MENA. and Economic Factors reports finances are a stressor..  Patient identified their preferred language to be English. Patient reported they does not need the assistance of an  or other support involved in therapy.  Patient reports they are involved in [employer] activities.  They reports spirituality impacts recovery in the following ways:  \"it makes it worth living\".     Patient reported had no significant delays in developmental tasks.   Patient's highest education level was GED. Patient identified the following learning problems: none reported.  Patient reports they are  able to understand written materials.    Patient reported the following relationship history  and .  Patient's current relationship status is  for \"a month or two\".   Patient identified their sexual orientation as heterosexual.  Patient reported having 2 child(monica).     Patient's current living/housing situation involves transitional housing starting March 14th when pt was released from correction.  \"I was in for 6 months\".  The immediate members of family and household include \" IRTS facility clients.\" Marija and Clive, 5  & 9,My children are with their Dad\".  and they report that housing is stable. Patient identified father; siblings as part of their support system.  Patient identified the quality of these relationships as stable and meaningful.      Patient reports engaging in the following recreational/leisure activities: \"read, write, walks\".  Patient is currently unemployed.  Patient reports " "their income is obtained through general assistance.  Patient does identify finances as a current stressor.      Patient reports the following substance related arrests or legal issues: 2009, possession of Meth.  Recetn arrest was a burglary, but suspended due to mental healt so no more charge.\". They are not under any current court jurisdiction.   \"under a commitment of some type\".    Patient's Strengths and Limitations:  Patient identified the following strengths or resources that will help them succeed in treatment: , Sabianist / Jehovah's witness, commitment to health and well being, community involvement, exercise routine, rusty / spirituality, friends / good social support, family support, intelligence, motivation, sense of humor, sober support group / recovery support , sponsor and strong social skills. Things that may interfere with the patient's success in treatment include: financial hardship.     Assessments:  The following assessments were completed by patient for this visit:  PHQ2:       4/6/2022     1:17 PM   PHQ-2 ( 1999 [employer])   PHQ-2 Score Incomplete    Incomplete     PHQ9:       8/17/2020     3:32 PM 11/18/2020    10:00 AM 1/4/2022     1:00 PM 4/6/2022     1:17 PM 4/7/2023     9:58 AM   PHQ-9 SCORE   PHQ-9 Total Score MyChart    13 (Moderate depression) 9 (Mild depression)   PHQ-9 Total Score 1 17 16 13 9     GAD2:       4/7/2023    10:12 AM   DAE-2   Feeling nervous, anxious, or on edge 1   Not being able to stop or control worrying 0   DAE-2 Total Score 1     GAD7:       8/17/2020     3:33 PM 11/18/2020    10:00 AM 4/6/2022     1:17 PM   DAE-7 SCORE   Total Score   21 (severe anxiety)   Total Score 7 21 21     PROMIS 10-Global Health (all questions and answers displayed):       4/7/2023    10:13 AM   PROMIS 10   In general, would you say your health is: Good   In general, would you say your quality of life is: Good   In general, how would you rate your physical health? Good   In general, how " would you rate your mental health, including your mood and your ability to think? Fair   In general, how would you rate your satisfaction with your social activities and relationships? Good   In general, please rate how well you carry out your usual social activities and roles Very good   To what extent are you able to carry out your everyday physical activities such as walking, climbing stairs, carrying groceries, or moving a chair? Completely   In the past 7 days, how often have you been bothered by emotional problems such as feeling anxious, depressed, or irritable? Often   In the past 7 days, how would you rate your fatigue on average? Moderate   In the past 7 days, how would you rate your pain on average, where 0 means no pain, and 10 means worst imaginable pain? 5   In general, would you say your health is: 3   In general, would you say your quality of life is: 3   In general, how would you rate your physical health? 3   In general, how would you rate your mental health, including your mood and your ability to think? 2   In general, how would you rate your satisfaction with your social activities and relationships? 3   In general, please rate how well you carry out your usual social activities and roles. (This includes activities at home, at work and in your community, and responsibilities as a parent, child, spouse, employee, friend, etc.) 4   To what extent are you able to carry out your everyday physical activities such as walking, climbing stairs, carrying groceries, or moving a chair? 5   In the past 7 days, how often have you been bothered by emotional problems such as feeling anxious, depressed, or irritable? 4   In the past 7 days, how would you rate your fatigue on average? 3   In the past 7 days, how would you rate your pain on average, where 0 means no pain, and 10 means worst imaginable pain? 5   Global Mental Health Score 10   Global Physical Health Score 14   PROMIS TOTAL - SUBSCORES 24      [place] Suicide Severity Rating Scale (Lifetime/Recent)      4/7/2023    10:00 AM   [place] Suicide Severity Rating (Lifetime/Recent)   1. Wish to be Dead (Lifetime) Y   1. Wish to be Dead (Past 1 Month) N   2. Non-Specific Active Suicidal Thoughts (Lifetime) N   Has subject engaged in non-suicidal self-injurious behavior? (Lifetime) N   Calculated C-SSRS Risk Score (Lifetime/Recent) No Risk Indicated     GAIN-sliding scale:      4/7/2023    10:00 AM   When was the last time that you had significant problems...   with feeling very trapped, lonely, sad, blue, depressed or hopeless about the future? Past month   with sleep trouble, such as bad dreams, sleeping restlessly, or falling asleep during the day? Never   with feeling very anxious, nervous, tense, scared, panicked or like something bad was going to happen? 2 to 12 months ago   with becoming very distressed & upset when something reminded you of the past? 2 to 12 months ago   with thinking about ending your life or committing suicide? Never          4/7/2023    10:00 AM   When was the last time that you did the following things 2 or more times?   Lied or conned to get things you wanted or to avoid having to do something? 2 to 12 months ago   Had a hard time paying attention at school, work or home? 2 to 12 months ago   Had a hard time listening to instructions at school, work or home? Never   Were a bully or threatened other people? Never   Started physical fights with other people? Never       Personal and Family Medical History:  Patient does report a family history of mental health concerns.  Patient reports family history includes Depression in her brother; Ovarian Cancer in her mother; Schizophrenia in her brother; Substance Abuse in her brother and paternal grandfather..      Patient reported the following previous mental health diagnoses: an anxiety disorder; depression; PTSD; schizophrenia.  Patient reports their primary mental health symptoms  "include:  \"just depression and a little bit anxious\"  and these do impact her ability to function.   Patient received mental health services in the past: case management; therapy; Behavioral Health Clinician; psychiatry; Intensive Residential Treatment Services (IRTS)  Psychiatric Hospitalizations:  [facility] when August 2022.  Psychosis,Patient reports they are currently under a civil commitment for mental health..  Current mental health services/providers include:  Psychiatrist, therapy at [facility] and medication..      Patient has had a physical exam to rule out medical causes for current symptoms.  Date of last physical exam was greater than a year ago and client was encouraged to schedule an exam with PCP. The patient has a [facility] Primary Care Provider, who is named Alyssia Scott..  Patient reports the following current medical concerns: TBI from April 2022..  Patient denies any issues with pain.. Patient denies pregnancy..  There are not significant appetite / nutritional concerns / weight changes.  Patient does not report a history of an eating disorder.  Patient does report a history of head injury / trauma / cognitive impairment.      Patient reports current meds as:   Current Outpatient Medications   Medication     gabapentin (NEURONTIN) 300 MG capsule     mirtazapine (REMERON) 7.5 MG tablet     butalbital-acetaminophen-caffeine (ESGIC) -40 MG tablet     escitalopram (LEXAPRO) 10 MG tablet     levonorgestrel (PLAN B) 1.5 MG tablet     LORazepam (ATIVAN) 0.5 MG tablet     rizatriptan (MAXALT-MLT) 5 MG ODT     No current facility-administered medications for this encounter.   Risperidone-2 mg  Suboxone-2 mg    Medication Adherence:  Patient reports  taking prescribed medications as prescribed.   Patient is able to self-administer medications.      Patient Allergies:  No Known Allergies    Medical History:    Past Medical History:   Diagnosis Date     Anxiety 6/16/2015     GERD " "(gastroesophageal reflux disease) 5/23/2015     Headache      Migraine headache 6/23/2015     Mood disorder (H) 6/16/2015     Opiate abuse, episodic (H)     Stopped and detoxed.     Panic disorder 6/23/2015     PTSD (post-traumatic stress disorder) 2009       Substance Use:  Patient reported the following biological family members or relatives with chemical health issues:  Paternal grandfather \"drinks a lot\".  Pt's brother who has passed abused Heroin...  Patient has received substance use disorder and/or gambling treatment in the past.  Patient reports the following dates and locations of treatment services:  \"most recent was [place] and felt it was not safe with people selling drugs, [facility], [facility], [facility], [facility], [facility] and liked it a lot.  6 IPS and 3-4 OPs.  I graduated almost all of them.\".  Patient has ever been to detox.  Patient is currently in an IRTS facility, but is not in CD treatment.. Patient reports they have attended the following support groups: NA, AA in the past.      Substance History of use Age of first use Pattern and duration of use (include amounts and frequency) Date of last use     Withdrawal potential Route of administration   Alcohol used in the past   16 HU: \"age 21-age 23\"  Age 24-\"had my first kid so it calmed down quite a bit\".    Past couple years \"barely at all\" October of 2022 No oral   Cannabis   used in the past 16 Weed-  16/17/18-\"a gram a day\"  Age 18-\"increased to maybe two grams a day and that continued to Oct 2022\".  October of 2022. No smoked     Amphetamines   used in the past 17 Meth-Age 17- \"not sure how much.  Increased as time went on.  the most would be maybe a gram in a day.  6 months sober until April of 2022.  Had a TBI and ended up relapsing. 2021/2022: \"whatever was around and daily use.  Had been daily.\"  Used Aderral in the past and last time was April of 2022.\" \"out of [place], October 2022 and used.  Back to " "detention in November 2022.  Out of detention  March 14th and no use since October.\" No smoked and snorted   Cocaine/crack    never used  Cocaine-\"maybe when younger, but then changed to Meth\"      Hallucinogens never used   [age] \"Tried Acid and Mushrooms one time each\"      Inhalants never used          Heroin used in the past   21  Heroin-\"tried it and was hooked until age 23 when on Methadone which I got off at age 26\".   07/01/17  No smoked and snorted   Other Opiates used in the past 18 \"whatever I could get in the past\"  Suboxone-\"currently, 2mg once a day\" 09/02/17     Suboxone-current. No oral   Benzodiazepine   used in the past 17 \"problem with them from age [age].  After a trauma I was prescribed for awhile, but when taken off the RX, but kept using Ativan and Xanax. April 2022-July 2022,    \"Did some and daily, 5-6 pills, 1- 10 mg pills.\" No oral   Barbiturates never used        Over the counter meds never used        Caffeine currently use 14 Coffee-\"3 a day\"  Soda pop or Energy drinks-\"one a day\" 4/7/2023   No oral   Nicotine  currently use 17 \"a half a pack a day\" 04/07/23 No smoked   other substances not listed above:  Identify:  never used            Patient reported the following problems as a result of their substance use:family problems; relationship problems.  Patient is concerned about substance use, \"Meth\". Patient reports her family and friends is concerned about their substance use.  Patient reports their recovery goals are to get extra support from an IOP.     Patient reports experiencing the following withdrawal symptoms within the past 12 months: none and the following within the past 30 days: nonePatients reports:     Alcohol/drug is often taken in larger amounts or over a longer period than was intended.  Met for Alcohol, Cannabis, Sedative, hypnotic, or anxiolytics, Amphetamines and Tobacco.   There is a persistent desire or unsuccessful efforts to cut down or control alcohol/drug use.  Met for " Alcohol, Cannabis, Sedative, hypnotic, or anxiolytics, Amphetamines and Tobacco.  A great deal of time is spent in activities necessary to obtain alcohol, use alcohol, or recover from its effects.  Met for Alcohol,  Cannabis, Sedative, hypnotic, or anxiolytics and Amphetamines.   Craving, or a strong desire or urge to use alcohol/drug.  Met for Alcohol, Cannabis, Opiates, Sedative, hypnotic, or anxiolytics, Amphetamines and Tobacco.   Recurrent alcohol/drug use resulting in a failure to fulfill major role obligations at work, school or home.  Met for Alcohol, Cannabis, Opiates, Sedative, hypnotic, or anxiolytics and Amphetamines.   Continued alcohol use despite having persistent or recurrent social or interpersonal problems caused or exacerbated by the effects of alcohol/drug.  Met for Alcohol, Cannabis, Opiates, Sedative, hypnotic, or anxiolytics and Amphetamines.   Important social, occupational, or recreational activities are given up or reduced because of alcohol/drug use.  Met for Alcohol, Cannabis, Opiates, Sedative, hypnotic, or anxiolytics and Amphetamines.   Recurrent alcohol/drug use in situations in which it is physically hazardous.  Met for Alcohol and unsafe environments because of Meth, opiates, sedatives.  Alcohol/drug use is continued despite knowledge of having a persistent or recurrent physical or psychological problem that is likely to have been caused or exacerbated by alcohol.  Met for Alcohol, Cannabis, Opiates, Sedative, hypnotic, or anxiolytics, Amphetamines and Tobacco.  Tolerance, as defined by either of the following: A need for markedly increased amounts of alcohol/drug to achieve intoxication or desired effect..  Met for Alcohol, Cannabis, Opiates, Sedative, hypnotic, or anxiolytics and Amphetamines.   Withdrawal, as manifested by either of the following: The characteristic withdrawal syndrome for alcohol/drug (refer to Criteria A and B of the criteria set for alcohol/drug withdrawal).  "and Alcohol/drug (or a closely related substance, such as a benzodiazepine) is taken to relieve or avoid withdrawal symptoms.. Met for Opiates.     Patient reports the following problem behaviors while under the influence of substances passing out, psychosis, illegal behaviors in the past.    Patient reports substance use has ever impacted their ability to function in a school setting. Patient reports substance use has ever impacted their ability to function in a work setting.  Patients demographics and history impact their recovery in the following ways:  MH, trauma and some family HX of MENA.  Patient reports engaging in the following recreation/leisure activities while using:  'hanging out with people\".  Patient reports the following people are supportive of recovery: family and friends.    Patient does not have a history of gambling concerns and/or treatment.  Patient does not have other addictive behaviors she is concerned about.        Dimension Scale Ratings:    Dimension 1 -  Acute Intoxication/Withdrawal: 0 - No Problem The pt reports no current withdrawal symptoms and reports she is in an early remission from all drugs and alcohl.  pt has RX Suboxone currently as she is in a sustained remission from Opioid Use Disorder.  Dimension 2 - Biomedical: 1 - Minor Problem Patient has had a physical exam to rule out medical causes for current symptoms.  Date of last physical exam was greater than a year ago and client was encouraged to schedule an exam with PCP. The patient has a [facility] Primary Care Provider, who is named Alyssia Scott..  Patient reports the following current medical concerns: TBI from April 2022..  Patient denies any issues with pain.. Patient denies pregnancy..  There are not significant appetite / nutritional concerns / weight changes.  Patient does not report a history of an eating disorder.  Patient does report a history of head injury / trauma / cognitive impairment.  " "  Dimension 3 - Emotional/Behavioral/Cognitive Conditions: 2 - Moderate Problem Patient reported the following previous mental health diagnoses: an anxiety disorder; depression; PTSD; schizophrenia.  Patient reports their primary mental health symptoms include:  \"just depression and a little bit anxious\"  and these do impact her ability to function.   Patient received mental health services in the past: case management; therapy; Behavioral Health Clinician; psychiatry; Intensive Residential Treatment Services (IRTS)  Psychiatric Hospitalizations:  [facility] when August 2022.  Psychosis,Patient reports they are currently under a civil commitment for mental health..  Current mental health services/providers include:  Psychiatrist, therapy at [facility] and medication.. Pt reports no SI/SIB or SA in her lifetime.  The pt is currently getting adequate MH support from her IRTS facility, therapist and psychiatrist.  Dimension 4 - Readiness to Change:  0 - No Problem The pt expresses a strong desire to get MENA support.  Dimension 5 - Relapse/Continued Use/ Continued Problem Potential: 2 - Moderate Problem The pt meets criteria for a severe MENA and is in a reportedly early admission from Alcohol, Cannabis, Amphetamine and Sedative Abuse and in a sustained remission from Opioid Use Disorder.  She has significant co-occurring MH and MENA as well as trauma which places her at a significant risk of relapse despite more recent abstinence.  the pt reports much of her abstinence was while in prison and she is concerned about relapse.  Dimension 6 - Recovery Environment:  2 - Moderate Problem The pt reports she is currently residing at an IRTS facility.  She reports good support for her [facility] and is looking for more kacey support including OP [place].  the pt has been in prison and has concerns about relapse now that she has been released.  She has some family support.  She is open to support groups and to having a sponsor.  She is not " "employed or in school at this time.    Significant Losses / Trauma / Abuse / Neglect Issues:   Patient did not  serve in the .  There are indications or report of significant loss, trauma, abuse or neglect issues related to: \"there are a lot of them\".  Concerns for possible neglect are not present.     Safety Assessment:   Patient denies current homicidal ideation and behaviors.  Patient denies current self-injurious ideation and behaviors.    Patient denied risk behaviors associated with substance use.  Patient reported substance use associated with mental health symptoms.  Patient reports the following current concerns for their personal safety: None.  Patient reports there are not firearms in the house.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

## 2023-04-10 ENCOUNTER — TELEPHONE (OUTPATIENT)
Dept: BEHAVIORAL HEALTH | Facility: CLINIC | Age: 33
End: 2023-04-10
Payer: COMMERCIAL

## 2023-04-12 NOTE — ADDENDUM NOTE
Encounter addended by: Lisset Rivera [facility] on: 4/12/2023 2:48 PM   Actions taken: Clinical Note Signed

## 2023-04-25 NOTE — ADDENDUM NOTE
Encounter addended by: Lisset Rivera [facility] on: 4/25/2023 2:46 PM   Actions taken: Clinical Note Signed

## 2023-06-06 ENCOUNTER — TELEPHONE (OUTPATIENT)
Dept: BEHAVIORAL HEALTH | Facility: CLINIC | Age: 33
End: 2023-06-06
Payer: COMMERCIAL

## 2023-06-06 NOTE — TELEPHONE ENCOUNTER
All Adult MENA services switched to in person as of 5/15/23, unbale to contact client/no return call to schedule IOP. Client will need an update prior to scheduling IOP services

## 2023-07-06 PROBLEM — O09.90 SUPERVISION OF HIGH-RISK PREGNANCY: Status: ACTIVE | Noted: 2017-09-27

## 2023-07-06 PROBLEM — H52.4 ACCOMMODATIVE INSUFFICIENCY: Status: ACTIVE | Noted: 2023-04-05

## 2023-07-06 PROBLEM — H52.223 MYOPIA OF BOTH EYES WITH REGULAR ASTIGMATISM: Status: ACTIVE | Noted: 2023-04-05

## 2023-07-06 PROBLEM — H55.81 SACCADIC EYE MOVEMENT DEFICIENCY: Status: ACTIVE | Noted: 2023-04-05

## 2023-07-06 PROBLEM — H47.9 DISORDER OF VISUAL PATHWAY: Status: ACTIVE | Noted: 2023-04-05

## 2023-07-06 PROBLEM — H53.143 PHOTOPHOBIA OF BOTH EYES: Status: ACTIVE | Noted: 2023-04-05

## 2023-07-06 PROBLEM — H04.123 DRY EYES, BILATERAL: Status: ACTIVE | Noted: 2023-04-05

## 2023-07-06 PROBLEM — H53.30 BINOCULAR VISION DISORDER: Status: ACTIVE | Noted: 2023-04-05

## 2023-07-06 PROBLEM — Z78.9 NOT IMMUNE TO HEPATITIS B VIRUS: Status: ACTIVE | Noted: 2017-05-09

## 2023-07-06 PROBLEM — G43.001 MIGRAINE WITHOUT AURA AND WITH STATUS MIGRAINOSUS, NOT INTRACTABLE: Status: ACTIVE | Noted: 2018-01-22

## 2023-07-06 PROBLEM — H52.13 MYOPIA OF BOTH EYES WITH REGULAR ASTIGMATISM: Status: ACTIVE | Noted: 2023-04-05

## 2023-07-07 ENCOUNTER — OFFICE VISIT (OUTPATIENT)
Dept: FAMILY MEDICINE | Facility: CLINIC | Age: 33
End: 2023-07-07
Payer: COMMERCIAL

## 2023-07-07 ENCOUNTER — TELEPHONE (OUTPATIENT)
Dept: FAMILY MEDICINE | Facility: CLINIC | Age: 33
End: 2023-07-07

## 2023-07-07 VITALS
TEMPERATURE: 98 F | OXYGEN SATURATION: 97 % | RESPIRATION RATE: 12 BRPM | HEART RATE: 74 BPM | BODY MASS INDEX: 27.08 KG/M2 | DIASTOLIC BLOOD PRESSURE: 60 MMHG | HEIGHT: 68 IN | WEIGHT: 178.7 LBS | SYSTOLIC BLOOD PRESSURE: 92 MMHG

## 2023-07-07 DIAGNOSIS — F41.0 PANIC ATTACK: ICD-10-CM

## 2023-07-07 DIAGNOSIS — S06.9X1S TRAUMATIC BRAIN INJURY, WITH LOSS OF CONSCIOUSNESS OF 30 MINUTES OR LESS, SEQUELA (H): ICD-10-CM

## 2023-07-07 DIAGNOSIS — Z13.228 SCREENING FOR METABOLIC DISORDER: ICD-10-CM

## 2023-07-07 DIAGNOSIS — Z11.59 NEED FOR HEPATITIS C SCREENING TEST: ICD-10-CM

## 2023-07-07 DIAGNOSIS — R00.2 PALPITATIONS: ICD-10-CM

## 2023-07-07 DIAGNOSIS — F43.21 GRIEF REACTION: ICD-10-CM

## 2023-07-07 DIAGNOSIS — Z00.01 ENCOUNTER FOR ROUTINE ADULT MEDICAL EXAM WITH ABNORMAL FINDINGS: Primary | ICD-10-CM

## 2023-07-07 DIAGNOSIS — Z12.4 CERVICAL CANCER SCREENING: ICD-10-CM

## 2023-07-07 DIAGNOSIS — Z11.4 SCREENING FOR HIV (HUMAN IMMUNODEFICIENCY VIRUS): ICD-10-CM

## 2023-07-07 PROBLEM — H47.9 DISORDER OF VISUAL PATHWAY: Status: RESOLVED | Noted: 2023-04-05 | Resolved: 2023-07-07

## 2023-07-07 PROBLEM — F33.0 MILD EPISODE OF RECURRENT MAJOR DEPRESSIVE DISORDER (H): Status: RESOLVED | Noted: 2022-02-15 | Resolved: 2023-07-07

## 2023-07-07 PROBLEM — O09.90 SUPERVISION OF HIGH-RISK PREGNANCY: Status: RESOLVED | Noted: 2017-09-27 | Resolved: 2023-07-07

## 2023-07-07 LAB
CHOLEST SERPL-MCNC: 235 MG/DL
HBA1C MFR BLD: 5.4 % (ref 0–5.6)
HDLC SERPL-MCNC: 53 MG/DL
LDLC SERPL CALC-MCNC: 147 MG/DL
NONHDLC SERPL-MCNC: 182 MG/DL
TRIGL SERPL-MCNC: 174 MG/DL
TSH SERPL DL<=0.005 MIU/L-ACNC: 3.76 UIU/ML (ref 0.3–4.2)

## 2023-07-07 PROCEDURE — 36415 COLL VENOUS BLD VENIPUNCTURE: CPT | Performed by: FAMILY MEDICINE

## 2023-07-07 PROCEDURE — 83036 HEMOGLOBIN GLYCOSYLATED A1C: CPT | Performed by: FAMILY MEDICINE

## 2023-07-07 PROCEDURE — 99395 PREV VISIT EST AGE 18-39: CPT | Performed by: FAMILY MEDICINE

## 2023-07-07 PROCEDURE — 99213 OFFICE O/P EST LOW 20 MIN: CPT | Mod: 25 | Performed by: FAMILY MEDICINE

## 2023-07-07 PROCEDURE — 80061 LIPID PANEL: CPT | Performed by: FAMILY MEDICINE

## 2023-07-07 PROCEDURE — 84443 ASSAY THYROID STIM HORMONE: CPT | Performed by: FAMILY MEDICINE

## 2023-07-07 RX ORDER — BUPROPION HYDROCHLORIDE 150 MG/1
150 TABLET ORAL EVERY MORNING
COMMUNITY
Start: 2023-06-26 | End: 2023-09-27

## 2023-07-07 RX ORDER — RISPERIDONE 3 MG/1
TABLET ORAL
COMMUNITY
Start: 2023-04-19 | End: 2023-07-07

## 2023-07-07 RX ORDER — SUMATRIPTAN 25 MG/1
TABLET, FILM COATED ORAL
COMMUNITY
Start: 2023-03-27 | End: 2023-09-27

## 2023-07-07 RX ORDER — BUSPIRONE HYDROCHLORIDE 30 MG/1
30 TABLET ORAL 2 TIMES DAILY
Qty: 30 TABLET | Refills: 1 | Status: SHIPPED | OUTPATIENT
Start: 2023-07-07 | End: 2023-08-07

## 2023-07-07 RX ORDER — CLONIDINE HYDROCHLORIDE 0.1 MG/1
1 TABLET ORAL
COMMUNITY
Start: 2023-06-26 | End: 2023-09-27

## 2023-07-07 RX ORDER — LORAZEPAM 2 MG/1
2 TABLET ORAL EVERY 6 HOURS PRN
Qty: 4 TABLET | Refills: 1 | Status: SHIPPED | OUTPATIENT
Start: 2023-07-07 | End: 2023-07-10

## 2023-07-07 RX ORDER — TOPIRAMATE 50 MG/1
50 TABLET, FILM COATED ORAL AT BEDTIME
COMMUNITY
Start: 2023-06-26 | End: 2023-09-27

## 2023-07-07 RX ORDER — MIRTAZAPINE 15 MG/1
TABLET, FILM COATED ORAL
COMMUNITY
Start: 2023-05-30 | End: 2023-07-07

## 2023-07-07 RX ORDER — PRAZOSIN HYDROCHLORIDE 2 MG/1
2 CAPSULE ORAL AT BEDTIME
COMMUNITY
Start: 2023-06-27 | End: 2023-09-27

## 2023-07-07 RX ORDER — GABAPENTIN 600 MG/1
TABLET ORAL
COMMUNITY
Start: 2023-06-27 | End: 2023-09-27

## 2023-07-07 ASSESSMENT — ENCOUNTER SYMPTOMS
CONSTIPATION: 0
PARESTHESIAS: 0
DYSURIA: 0
DIZZINESS: 1
SHORTNESS OF BREATH: 0
MYALGIAS: 0
FREQUENCY: 0
JOINT SWELLING: 0
COUGH: 0
DIARRHEA: 0
ARTHRALGIAS: 0
NAUSEA: 0
FEVER: 0
HEADACHES: 1
NERVOUS/ANXIOUS: 1
EYE PAIN: 0
WEAKNESS: 0
HEMATOCHEZIA: 0
ABDOMINAL PAIN: 0
SORE THROAT: 0
PALPITATIONS: 0
HEMATURIA: 0
CHILLS: 1
HEARTBURN: 0

## 2023-07-07 ASSESSMENT — ANXIETY QUESTIONNAIRES
3. WORRYING TOO MUCH ABOUT DIFFERENT THINGS: NEARLY EVERY DAY
2. NOT BEING ABLE TO STOP OR CONTROL WORRYING: NEARLY EVERY DAY
4. TROUBLE RELAXING: NEARLY EVERY DAY
6. BECOMING EASILY ANNOYED OR IRRITABLE: NEARLY EVERY DAY
1. FEELING NERVOUS, ANXIOUS, OR ON EDGE: NEARLY EVERY DAY
7. FEELING AFRAID AS IF SOMETHING AWFUL MIGHT HAPPEN: NEARLY EVERY DAY
GAD7 TOTAL SCORE: 21
5. BEING SO RESTLESS THAT IT IS HARD TO SIT STILL: NEARLY EVERY DAY
GAD7 TOTAL SCORE: 21

## 2023-07-07 ASSESSMENT — PATIENT HEALTH QUESTIONNAIRE - PHQ9
SUM OF ALL RESPONSES TO PHQ QUESTIONS 1-9: 19
10. IF YOU CHECKED OFF ANY PROBLEMS, HOW DIFFICULT HAVE THESE PROBLEMS MADE IT FOR YOU TO DO YOUR WORK, TAKE CARE OF THINGS AT HOME, OR GET ALONG WITH OTHER PEOPLE: EXTREMELY DIFFICULT
SUM OF ALL RESPONSES TO PHQ QUESTIONS 1-9: 19
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
## 2023-07-07 NOTE — TELEPHONE ENCOUNTER
7-7-23    FYI - Status Update    Who is Calling: patient    Update: pt called & is requesting to switch pharmacies for:  LORazepam (ATIVAN) 2 MG tablet  Pt wants to use:  [facility] [place]- [address] [facility] [address]  Phone# [phone]  [facility] [address]    Does caller want a call/response back: No

## 2023-07-08 ASSESSMENT — ANXIETY QUESTIONNAIRES: GAD7 TOTAL SCORE: 21

## 2023-07-10 ENCOUNTER — TELEPHONE (OUTPATIENT)
Dept: FAMILY MEDICINE | Facility: CLINIC | Age: 33
End: 2023-07-10
Payer: COMMERCIAL

## 2023-07-10 RX ORDER — LORAZEPAM 2 MG/1
2 TABLET ORAL EVERY 6 HOURS PRN
Qty: 4 TABLET | Refills: 1 | Status: SHIPPED | OUTPATIENT
Start: 2023-07-10 | End: 2023-09-27

## 2023-07-10 NOTE — TELEPHONE ENCOUNTER
Prior Authorization Retail Medication Request    Medication/Dose: naloxone (NARCAN) 4 MG/0.1ML nasal spray    Insurance Name:  [facility]  Insurance ID:  [id number]   CoverMyMeds Key: [id number]    Pharmacy: [id number] [place], [place] - [address] AT [facility] [address]  [address] [facility]

## 2023-08-07 DIAGNOSIS — F43.21 GRIEF REACTION: ICD-10-CM

## 2023-08-07 RX ORDER — BUSPIRONE HYDROCHLORIDE 30 MG/1
TABLET ORAL
Qty: 180 TABLET | Refills: 3 | Status: SHIPPED | OUTPATIENT
Start: 2023-08-07 | End: 2023-11-08

## 2023-08-07 NOTE — TELEPHONE ENCOUNTER
"Last Written Prescription Date:  7/7/23  Last Fill Quantity: 30,  # refills: 1   Last office visit provider:  7/7/23     Requested Prescriptions   Pending Prescriptions Disp Refills    busPIRone HCl (BUSPAR) 30 MG tablet [Pharmacy Med Name: BUSPIRONE 30MG TABLETS] 30 tablet 1     Sig: TAKE 1 TABLET(30 MG) BY MOUTH TWICE DAILY       Atypical Antidepressants Protocol Passed - 8/7/2023  3:33 AM        Passed - Recent (12 mo) or future (30 days) visit within the authorizing provider's specialty     Patient has had an office visit with the authorizing provider or a provider within the authorizing providers department within the previous 12 mos or has a future within next 30 days. See \"Patient Info\" tab in inbasket, or \"Choose Columns\" in Meds & Orders section of the refill encounter.              Passed - Medication active on med list        Passed - Patient is age 18 or older        Passed - No active pregnancy on record        Passed - No positive pregnancy test in past 12 mos             ADY TROTTER RN 08/07/23 10:20 AM  "

## 2023-09-25 ENCOUNTER — TELEPHONE (OUTPATIENT)
Dept: FAMILY MEDICINE | Facility: CLINIC | Age: 33
End: 2023-09-25
Payer: COMMERCIAL

## 2023-09-25 NOTE — TELEPHONE ENCOUNTER
Reason for Call:  Appointment Request    Patient requesting this type of appt:  Pregnancy     Requested provider: OB provider at the [facility]    Reason patient unable to be scheduled: Needs to be scheduled by clinic    When does patient want to be seen/preferred time: as soon as possible    Comments: Lauren would like to schedule her first provider OB visit at the [facility]. Clinic will have to call pt to schedule appt.    Could we send this information to you in [employer] or would you prefer to receive a phone call?:   Patient would prefer a phone call   Okay to leave a detailed message?: Yes at Cell number on file:    Telephone Information:   Mobile [phone]       Call taken on 9/25/2023 at 11:58 AM by Lizzie Landin

## 2023-09-27 ENCOUNTER — PRENATAL OFFICE VISIT (OUTPATIENT)
Dept: FAMILY MEDICINE | Facility: CLINIC | Age: 33
End: 2023-09-27
Payer: COMMERCIAL

## 2023-09-27 VITALS
WEIGHT: 156.6 LBS | RESPIRATION RATE: 12 BRPM | HEIGHT: 68 IN | OXYGEN SATURATION: 97 % | BODY MASS INDEX: 23.73 KG/M2 | DIASTOLIC BLOOD PRESSURE: 64 MMHG | TEMPERATURE: 97.3 F | SYSTOLIC BLOOD PRESSURE: 110 MMHG | HEART RATE: 64 BPM

## 2023-09-27 DIAGNOSIS — F19.20 POLYSUBSTANCE DEPENDENCE (H): ICD-10-CM

## 2023-09-27 DIAGNOSIS — F15.20 AMPHETAMINE USE DISORDER, SEVERE (H): ICD-10-CM

## 2023-09-27 DIAGNOSIS — Z12.4 CERVICAL CANCER SCREENING: ICD-10-CM

## 2023-09-27 DIAGNOSIS — Z72.89 CURRENT EVERY DAY VAPING: ICD-10-CM

## 2023-09-27 DIAGNOSIS — O09.91 SUPERVISION OF HIGH RISK PREGNANCY IN FIRST TRIMESTER: Primary | ICD-10-CM

## 2023-09-27 DIAGNOSIS — F33.1 MODERATE EPISODE OF RECURRENT MAJOR DEPRESSIVE DISORDER (H): ICD-10-CM

## 2023-09-27 DIAGNOSIS — F41.1 GAD (GENERALIZED ANXIETY DISORDER): ICD-10-CM

## 2023-09-27 DIAGNOSIS — Z11.4 SCREENING FOR HIV (HUMAN IMMUNODEFICIENCY VIRUS): ICD-10-CM

## 2023-09-27 DIAGNOSIS — N92.6 MISSED MENSES: ICD-10-CM

## 2023-09-27 PROBLEM — R00.2 PALPITATIONS: Status: RESOLVED | Noted: 2023-07-07 | Resolved: 2023-09-27

## 2023-09-27 PROBLEM — H53.143 PHOTOPHOBIA OF BOTH EYES: Status: RESOLVED | Noted: 2023-04-05 | Resolved: 2023-09-27

## 2023-09-27 LAB — HCG UR QL: POSITIVE

## 2023-09-27 PROCEDURE — 81025 URINE PREGNANCY TEST: CPT | Performed by: FAMILY MEDICINE

## 2023-09-27 PROCEDURE — 99215 OFFICE O/P EST HI 40 MIN: CPT | Performed by: FAMILY MEDICINE

## 2023-09-27 RX ORDER — ASPIRIN 81 MG/1
81 TABLET ORAL DAILY
Qty: 90 TABLET | Refills: 1 | Status: SHIPPED | OUTPATIENT
Start: 2023-09-27 | End: 2023-11-08

## 2023-09-27 RX ORDER — PRAZOSIN HYDROCHLORIDE 5 MG/1
5 CAPSULE ORAL AT BEDTIME
COMMUNITY
Start: 2023-09-07 | End: 2023-09-27

## 2023-09-27 RX ORDER — CHOLECALCIFEROL (VITAMIN D3) 50 MCG
1 TABLET ORAL DAILY
Qty: 90 TABLET | Refills: 1 | Status: SHIPPED | OUTPATIENT
Start: 2023-09-27 | End: 2024-08-24

## 2023-09-27 RX ORDER — PRENATAL VIT/IRON FUM/FOLIC AC 27MG-0.8MG
1 TABLET ORAL DAILY
Qty: 90 TABLET | Refills: 3 | Status: SHIPPED | OUTPATIENT
Start: 2023-09-27 | End: 2024-08-24

## 2023-09-27 ASSESSMENT — PATIENT HEALTH QUESTIONNAIRE - PHQ9
10. IF YOU CHECKED OFF ANY PROBLEMS, HOW DIFFICULT HAVE THESE PROBLEMS MADE IT FOR YOU TO DO YOUR WORK, TAKE CARE OF THINGS AT HOME, OR GET ALONG WITH OTHER PEOPLE: SOMEWHAT DIFFICULT
SUM OF ALL RESPONSES TO PHQ QUESTIONS 1-9: 13
SUM OF ALL RESPONSES TO PHQ QUESTIONS 1-9: 13

## 2023-09-27 NOTE — PROGRESS NOTES
Answers submitted by the patient for this visit:  Patient Health Questionnaire (Submitted on 2023)  If you checked off any problems, how difficult have these problems made it for you to do your work, take care of things at home, or get along with other people?: Somewhat difficult  PHQ9 TOTAL SCORE: 13  SUBJECTIVE:      33 year old, female, , not sure about her dates ,  who presents to the clinic today for a new ob visit.    Feels well. Has not  started PNV.  Estimated Date of Delivery: will confirm with ultrasound    Patient's last menstrual period was 2023 (approximate)..      She has not had bleeding since her LMP.   She has had mild nausea. Weight loss has not occurred.   This was not a planned pregnancy.   FOB is involved,  Nithin - unemployed    Single. Being together for last few month . 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  Currently in outpatient drug recovery program and taking Suboxone she will be changed to buprenorphine as she recently found to be pregnant  Currently not taking any depression or anxiety medication since she found out the pregnancy couple weeks ago  Patient does have appointment with her psychiatrist next month overall quite emotional but feeling well.  I was able to review her mental health history through [facility] and through epic.    ===========================================  ROS  PSYCHIATRIC:  Denies mood changes or panic attacks  PHQ9: Last PHQ-9 score on record= No Value exists for the : HP#PHQ9  Social History     Tobacco Use    Smoking status: Every Day     Packs/day: 0.50     Years: 5.00     Pack years: 2.50     Types: Cigarettes     Start date: 3/14/2023     Last attempt to quit: 2012     Years since quittin.7    Smokeless tobacco: Current    Tobacco comments:     e-cig   Substance Use Topics    Alcohol use: No     History   Drug Use Unknown      Comment: Drug use: In treatment currently (2017); wants help geting off of Suboxone and Valium;      History   Smoking Status    Every Day    Packs/day: 0.50    Years: 5.00    Types: Cigarettes    Start date: 3/14/2023    Last attempt to quit: 2012   Smokeless Tobacco    Current     Social History    Substance and Sexual Activity      Alcohol use: No    Family History   Problem Relation Age of Onset    Ovarian Cancer Mother         In remission as of 2017    Substance Abuse Paternal Grandfather     Schizophrenia Brother     Substance Abuse Brother     Depression Brother     Mental Illness Brother     Depression Other      ============================================  MEDICAL HISTORY   No Known Allergies    [unfilled]      Current Outpatient Medications:     aspirin 81 MG EC tablet, Take 1 tablet (81 mg) by mouth daily, Disp: 90 tablet, Rfl: 1    buprenorphine HCl-naloxone HCl (SUBOXONE) 8-2 MG per film, Place 1 Film under the tongue daily, Disp: , Rfl:     busPIRone HCl (BUSPAR) 30 MG tablet, TAKE 1 TABLET(30 MG) BY MOUTH TWICE DAILY, Disp: 180 tablet, Rfl: 3    naloxone (NARCAN) 4 MG/0.1ML nasal spray, Spray 1 spray (4 mg) into one nostril alternating nostrils as needed for opioid reversal every 2-3 minutes until assistance arrives, Disp: 0.2 mL, Rfl: 1    Prenatal Vit-Fe Fumarate-FA (PRENATAL MULTIVITAMIN W/IRON) 27-0.8 MG tablet, Take 1 tablet by mouth daily, Disp: 90 tablet, Rfl: 3    vitamin D3 (CHOLECALCIFEROL) 50 mcg (2000 units) tablet, Take 1 tablet (50 mcg) by mouth daily, Disp: 90 tablet, Rfl: 1    Past Medical History:   Diagnosis Date    Anxiety 2015    GERD (gastroesophageal reflux disease) 2015    Headache     Migraine headache 2015    Mood disorder (H) 2015    Opiate abuse, episodic (H)     Stopped and detoxed.    Panic disorder 2015    PTSD (post-traumatic stress disorder) 2009       History reviewed. No pertinent surgical history.         OB History    Para  "Term  AB Living   4 2 2 0 1 2   SAB IAB Ectopic Multiple Live Births   0 0 0 0 2      # Outcome Date GA Lbr Jose E/2nd Weight Sex Delivery Anes PTL Lv   4 Current            3 Term 17 40w2d 06:40 / 00:11 3.51 kg (7 lb 11.8 oz) M Vag-Vacuum  N OKSANA      Complications: Fetal Intolerance      Name: PURA CLOUD      Apgar1: 5  Apgar5: 8   2 Term 14 42w0d 09:00 3.941 kg (8 lb 11 oz) F Vag-Spont EPI N OKSANA      Birth Comments: System Generated. Please review and update pregnancy details.      Name: Marija      Apgar1: 6  Apgar5: 9   1 AB                GYN History-  Abnormal Pap Smears                        Cervical procedures: none                         History of STI: yes    I personally reviewed the past social/family/medical and surgical history on the date of service.   I reviewed lab work done at Intake visit with patient.    OBJECTIVE:   PHYSICAL EXAM:  /64   Pulse 64   Temp 97.3  F (36.3  C) (Temporal)   Resp 12   Ht 1.727 m (5' 8\")   Wt 71 kg (156 lb 9.6 oz)   LMP 2023 (Approximate)   SpO2 97%   BMI 23.81 kg/m    BMI- Body mass index is 23.81 kg/m ., GENERAL:  Pleasant pregnant female, alert, cooperative  and well groomed.    PHYSICAL EXAM  General: Alert, no acute distress.   EYES normocephalic conjunctivae are erna,   EAR Normal pearly TMs bilaterally without erythema, pus or fluid  Nose is clear.  Oropharynx is moist and clear,   Neck: supple without adenopathy or thyromegaly.  Lungs: Good aeration bilaterally.Clear to auscultation without wheezes, rales or rhonci.    Heart: regular rate and rhythm, normal S1 and S2, no murmurs  Pelvic exam: declined .   Abdomen: soft and nontender, bowel sounds are present, no hepatosplenomegaly or mass palpable.  Skin: clear without rash or lesions  Neuro: normal muscle tone in all 4 extremities, deep tendon reflexes 2+ symmetrically at the patella     Intrauterine pregnancy approx -4wk of GA will confirm with ultrasound     Genetic " Screening: First Trimester Screen    Lauren was seen today for confirmation of pregnancy.    Diagnoses and all orders for this visit:    Supervision of high risk pregnancy in first trimester  -     US OB <14 Weeks w Transvaginal Single; Future  -     Prenatal Vit-Fe Fumarate-FA (PRENATAL MULTIVITAMIN W/IRON) 27-0.8 MG tablet; Take 1 tablet by mouth daily  -     vitamin D3 (CHOLECALCIFEROL) 50 mcg (2000 units) tablet; Take 1 tablet (50 mcg) by mouth daily  -     aspirin 81 MG EC tablet; Take 1 tablet (81 mg) by mouth daily  -     Hepatitis C antibody; Future    Missed menses  -     HCG qualitative urine; Future  -     HCG qualitative urine    Amphetamine use disorder, severe (H)    Polysubstance dependence (H)  Comments:  will continue suboxone during pregnancy benefit outweight the risk    Moderate episode of recurrent major depressive disorder (H)  Comments:  Continue BuSpar 30 mg 3 times a day as needed.  Rest of the medication we will need help from psychiatrist    DAE (generalized anxiety disorder)  Comments:  Now just BuSpar.  Feels Wellbutrin make her more anxious    Current every day vaping  Comments:  1 cart ever day.  Be thinking about cutting down the weightbearing    Screening for HIV (human immunodeficiency virus)  -     HIV Antigen Antibody Combo; Future    Cervical cancer screening    Other orders  -     INFLUENZA VACCINE IM > 6 MONTHS VALENT IIV4 (AFLURIA/FLUZONE); Future  -     PRIMARY CARE FOLLOW-UP SCHEDULING; Future         PLAN:    Orders Placed This Encounter   Procedures    US OB <14 Weeks w Transvaginal Single    INFLUENZA VACCINE IM > 6 MONTHS VALENT IIV4 (AFLURIA/FLUZONE)    HCG qualitative urine    HIV Antigen Antibody Combo    Hepatitis C antibody         Discussed:  - Pre-pregnancy  Body mass index is 23.81 kg/m .   Reviewed best evidence for: weight gain for her weight and height for pregnancy:  RECOMMENDED WEIGHT GAIN: 15-25 lbs.   healthy diet and foods to avoid; exercise and activity  during pregnancy;avoiding exposure to toxoplasmosis; and maintenance of a generally healthy lifestyle.   - Influenza vaccine  not done yet  - COVID vaccinated  - Genetic screening options, including false positive rate with screening tests and diagnostic options (chorionic villus sampling, amniocentesis),     - Safe medications during pregnancy and prenatal vitamin daily discussed.   - Healthy habits including not using tobacco or alcohol, exercising regularly and maintaining healthy diet  - Information given on tips for dealing with nausea, healthy habits, exposures, safety, prenatal appointment schedule, and when to call the doctor.  - Recommendations for breastfeeding given.    - Preeclampsia risk factors:  High risk factors (1+): none  Moderate risk factors (2+): none  Based on her risk factors,  Lauren Santiago  is NOT at high risk of preeclampsia. Low-dose aspirin prophylaxis is NOT recommended for prevention of preeclampsia.     - Reviewed use of triage nurse line and contacting the on-call provider after hours for an urgent need such as fever, vagina bleeding, bladder or vaginal infection, rupture of membranes,  or term labor.    -   - Discussed the harms, benefits, side effects and alternative therapies for current prescribed and OTC medications.  Orders Placed This Encounter   Medications    DISCONTD: prazosin (MINIPRESS) 5 MG capsule     Sig: Take 5 mg by mouth At Bedtime    Prenatal Vit-Fe Fumarate-FA (PRENATAL MULTIVITAMIN W/IRON) 27-0.8 MG tablet     Sig: Take 1 tablet by mouth daily     Dispense:  90 tablet     Refill:  3    vitamin D3 (CHOLECALCIFEROL) 50 mcg (2000 units) tablet     Sig: Take 1 tablet (50 mcg) by mouth daily     Dispense:  90 tablet     Refill:  1    aspirin 81 MG EC tablet     Sig: Take 1 tablet (81 mg) by mouth daily     Dispense:  90 tablet     Refill:  1     - All pt's and FOB's Nithin  questions discussed and answered.  Pt verbalized understanding of and agreement to plan  of care.     - Continue scheduled prenatal care and prn if questions or concerns    Mulu Gómez MD 9/27/2023 12:54 PM   [facility].  [phone]

## 2023-09-27 NOTE — PATIENT INSTRUCTIONS
Managing Morning Sickness (01:55)  Your health professional recommends that you watch this short online health video.  Learn tips for dealing with morning sickness, no matter what time of day you have it.  Purpose:  Gives tips for managing morning sickness, including eating small low-fat meals and avoiding caffeine and spicy food.  Goal:  The user will learn tips for dealing with morning sickness during pregnancy.     How to watch the video    Scan the QR code   OR Visit the website    [email or web address]   Current as of: November 9, 2022               Content Version: 13.7    5411-6491 [employer].   Care instructions adapted under license by your healthcare professional. If you have questions about a medical condition or this instruction, always ask your healthcare professional. [employer] disclaims any warranty or liability for your use of this information.      Learning About High-Iron Foods  What foods are high in iron?     The foods you eat contain nutrients, such as vitamins and minerals. Iron is a nutrient. Your body needs the right amount to stay healthy and work as it should. You can use the list below to help you make choices about which foods to eat.  Here are some foods that contain iron. They have 1 to 2 milligrams of iron per serving.  Fruits  Figs (dried), 5 figs  Vegetables  Asparagus (canned), 6 philip  Oni, beet, Swiss chard, or turnip greens, 1 cup  Dried peas, cooked,   cup  Seaweed, spirulina (dried),   cup  Spinach, (cooked)   cup or (raw) 1 cup  Grains  Cereals, fortified with iron, 1 cup  Grits (instant, cooked), fortified with iron,   cup  Meats and other protein foods  Beans (kidney, lima, navy, white), canned or cooked,   cup  Beef or lamb, 3 oz  Chicken giblets, 3 oz  Chickpeas (garbanzo beans),   cup  Liver of beef, lamb, or pork, 3 oz  Oysters (cooked), 3 oz  Sardines (canned), 3 oz  Soybeans (boiled),   cup  Tofu (firm),   cup  Work with your  "doctor to find out how much of this nutrient you need. Depending on your health, you may need more or less of it in your diet.  Where can you learn more?  Go to https://www.[employer].net/patiented  Enter R005 in the search box to learn more about \"Learning About High-Iron Foods.\"  Current as of: March 1, 2023               Content Version: 13.7    0812-1986 [employer].   Care instructions adapted under license by your healthcare professional. If you have questions about a medical condition or this instruction, always ask your healthcare professional. [employer] disclaims any warranty or liability for your use of this information.      Learning About Prenatal Visits  Your Care Instructions     Regular prenatal visits are very important during any pregnancy. These quick office visits may seem simple and routine. But they can help you and your baby stay healthy. Your doctor is watching for problems that can only be found by regularly checking you and your baby. The visits also give you and your doctor time to build a good relationship.  Many women have prenatal visits every 4 to 6 weeks until week 28 of pregnancy. Then the visits become more frequent. This is often every 2 to 3 weeks through week 36 of pregnancy. In the final month of pregnancy, you likely will see your doctor every week. You may have a different schedule if you have a medical problem or are a teen.  At different times in your pregnancy, you will have exams and tests. Some are routine. Others are done only when there is a chance of a problem. Everything healthy you do for your body helps your growing baby. Rest when you need it. Eat well, drink plenty of water, and exercise regularly.  What happens during a prenatal visit?  You will have blood pressure checks, along with urine tests. You also may have blood tests. If you need to go to the bathroom while waiting for the doctor, tell the nurse. He or she will give you a " [place]: No sample cup so your urine can be tested.  You will be weighed and have your belly measured.  Your doctor may listen to your baby's heartbeat with a special stethoscope.  In your second trimester, your doctor will check your blood sugar (glucose tolerance test) for diabetes that can occur during pregnancy. This is gestational diabetes, which can harm your baby.  You will have tests to check for infections that could harm your . These include group B streptococcus and hepatitis B.  Your doctor may do ultrasounds to check for problems. This also checks your baby's position. An ultrasound uses sound waves to produce a picture of your baby.  You may have other tests at any time during your pregnancy.  Use your visits to discuss with your doctor any concerns you have.  How can you care for yourself at home?  Get plenty of rest.  Exercise every day, if your doctor says it is okay. If you have not exercised in the past, start out slowly. Take many short walks each day.  Eat a balanced diet. Make sure your diet includes plenty of beans, peas, and leafy green vegetables.  Drink plenty of fluids. Cut down on drinks with caffeine, such as coffee, tea, and cola. If you have kidney, heart, or liver disease and have to limit fluids, talk with your doctor before you increase the amount of fluids you drink.  Avoid chemical fumes, paint fumes, and poisons. Do not use alcohol, marijuana, or illegal drugs. Do not smoke, vape, or use tobacco. If you need help quitting, talk to your doctor about stop-smoking programs and medicines. These can increase your chances of quitting for good.  Review all of your medicines with your doctor. Some of your routine medicines may need to be changed to protect your baby. Do not stop or start taking any medicines without talking to your doctor first.  Follow-up care is a key part of your treatment and safety. Be sure to make and go to all appointments, and call your doctor if you are having  "problems. It's also a good idea to know your test results and keep a list of the medicines you take.  Where can you learn more?  Go to https://www.[employer].net/patiented  Enter J502 in the search box to learn more about \"Learning About Prenatal Visits.\"  Current as of: November 9, 2022               Content Version: 13.7    3029-2314 [facility].   Care instructions adapted under license by your healthcare professional. If you have questions about a medical condition or this instruction, always ask your healthcare professional. [facility] disclaims any warranty or liability for your use of this information.      Learning About Domestic Abuse  What is domestic abuse?  Domestic abuse is threats or violent behavior in a personal relationship. It can happen between past or current partners or spouses. It's also called domestic violence or intimate partner violence.  Domestic abuse can affect people of any ethnic group, race, or Oriental orthodox. It can affect teens, adults, or the elderly. And it can happen to people of any sexual identity or social status. But most abuse victims are women.  Abusers use fear, bullying, and threats to control their partners. They control what their partners do, where they go, or who they see. They may act jealous, controlling, or possessive. These early signs of abuse may happen soon after the start of the relationship. Sometimes it can be hard to notice abuse at first. But after the relationship becomes more serious, the abuse may get worse.  If you are being abused in your relationship, it's important to get help. The abuse is not your fault, and you don't have to face it alone.  Be careful  It may not be safe to take home domestic abuse information like this handout. Some people ask a trusted friend to keep it for them. It's also important to plan ahead and to memorize the phone number of places you can go for help. If you are concerned about your safety, do not " use your computer, smartphone, or tablet to read about domestic abuse.   What are the types of domestic abuse?  Abuse can be emotional, physical, or sexual.  Emotional abuse  Emotional abuse is a pattern of threats, insults, or controlling behavior. It includes verbal abuse. It goes beyond healthy disagreements in a relationship. It's a sign of an unhealthy relationship, and it may be against the law.  Do you feel threatened, intimidated, or controlled? Does your partner threaten your children, other family members, or pets?  Does your partner:  Use jokes meant to embarrass or shame you?  Call you names?  Tell you that you are a bad parent or threaten to take away your children?  Threaten to have you or your family members deported?  Control your access to money or other basic needs?  Control what you do, who you see or talk to, or where you go?  Another form of emotional abuse is denying that it is happening. Or the abuser may act like the abuse is no big deal or is your fault.  Sexual abuse  With sexual abuse, abusers may try to convince or force you to have sex. They may force you into sex acts you're not comfortable with. Or they may sexually assault you. Sexual abuse can happen even if you are in a committed relationship.  Physical abuse  Physical abuse means that a partner hits, kicks, or physically hurts you. Physical abuse that starts with a slap might lead to kicking, shoving, and choking over time. The abuser may also threaten to hurt or kill you.  What problems can domestic abuse lead to?  Domestic abuse can be very dangerous. It can cause serious, repeated injury. It can even lead to death.  All forms of abuse can cause long-term health problems from the stress of a violent relationship. Verbal abuse can lead to sexual and physical abuse.  Abuse causes emotional pain, depression, anxiety, and post-traumatic stress. Sexual abuse can lead to sexually transmitted infections (including HIV/AIDS) and  "unplanned pregnancy.  Pregnancy can be a very dangerous time for people in abusive relationships. Pregnant people who are abused may have anemia, infections, bleeding, or poor weight gain. Abuse during this time may also increase your baby's risk of low birth weight, premature birth, and death.  It can be hard for some victims of domestic abuse to ask for help or to leave their relationship. You may feel scared, stuck, or not sure what steps to take. But it's important not to ignore abuse. Talking to someone could be the first step to ending the abuse and taking care of your own health and happiness again.  Where can you get help?  Talk to a trusted friend. Find a local advocacy group, or talk to your doctor about the abuse.  Contact the National Domestic Violence Hotline at 2-767-985-WKXR (1-201.123.4651) for more safety tips. They can guide you to groups in your area that can help. Or go to the National Coalition Against Domestic Violence website at www.[employer].org to learn more.  Domestic violence groups or a counselor in your area can help you make a safety plan for yourself and your children.  When to call for help  Call 911 anytime you think you may need emergency care. For example, call if:  You think that you or someone you know is in danger of being abused.  You have been hurt and can't have someone safely take you to emergency care.  You have just been abused.  A family member has just been abused.  Where can you learn more?  Go to https://www.[employer].net/patiented  Enter S665 in the search box to learn more about \"Learning About Domestic Abuse.\"  Current as of: February 27, 2023               Content Version: 13.7    6140-2108 [employer].   Care instructions adapted under license by your healthcare professional. If you have questions about a medical condition or this instruction, always ask your healthcare professional. [employer] disclaims any warranty or liability for " your use of this information.      Vaginal Bleeding During Pregnancy: Care Instructions  Overview     It's common to have some vaginal spotting when you are pregnant. In some cases, the bleeding isn't serious. And there aren't any more problems with the pregnancy.  But sometimes bleeding is a sign of a more serious problem. This is more common if the bleeding is heavy or painful. Examples of more serious problems include miscarriage, an ectopic pregnancy, and a problem with the placenta.  You may have to see your doctor again to be sure everything is okay. You may also need more tests to find the cause of the bleeding.  Home treatment may be all you need. But it depends on what is causing the bleeding. Be sure to tell your doctor if you have any new symptoms or if your symptoms get worse.  The doctor has checked you carefully, but problems can develop later. If you notice any problems or new symptoms, get medical treatment right away.  Follow-up care is a key part of your treatment and safety. Be sure to make and go to all appointments, and call your doctor if you are having problems. It's also a good idea to know your test results and keep a list of the medicines you take.  How can you care for yourself at home?  If your doctor prescribed medicines, take them exactly as directed. Call your doctor if you think you are having a problem with your medicine.  Do not have vaginal sex until your doctor says it's okay.  Do not put anything in your vagina until your doctor says it's okay.  Ask your doctor about other activities you can or can't do.  Get a lot of rest. Being pregnant can make you tired.  Do not use nonsteroidal anti-inflammatory drugs (NSAIDs), such as ibuprofen (Advil, Motrin), naproxen (Aleve), or aspirin, unless your doctor says it is okay.  When should you call for help?   Call 911 anytime you think you may need emergency care. For example, call if:    You passed out (lost consciousness).     You have  "severe vaginal bleeding. This means you are soaking through a pad each hour for 2 or more hours.     You have sudden, severe pain in your belly or pelvis.   Call your doctor now or seek immediate medical care if:    You have new or worse vaginal bleeding.     You are dizzy or lightheaded, or you feel like you may faint.     You have pain in your belly, pelvis, or lower back.     You think that you are in labor.     You have a sudden release of fluid from your vagina.     You've been having regular contractions for an hour. This means that you've had at least 8 contractions within 1 hour or at least 4 contractions within 20 minutes, even after you change your position and drink fluids.     You notice that your baby has stopped moving or is moving much less than normal.   Watch closely for changes in your health, and be sure to contact your doctor if you have any problems.  Where can you learn more?  Go to https://www.[employer].com.[employer]/patiented  Enter N829 in the search box to learn more about \"Vaginal Bleeding During Pregnancy: Care Instructions.\"  Current as of: November 9, 2022               Content Version: 13.7    5916-6944 [employer].   Care instructions adapted under license by your healthcare professional. If you have questions about a medical condition or this instruction, always ask your healthcare professional. [employer] disclaims any warranty or liability for your use of this information.      "

## 2023-10-11 ENCOUNTER — HOSPITAL ENCOUNTER (OUTPATIENT)
Dept: ULTRASOUND IMAGING | Facility: CLINIC | Age: 33
Discharge: HOME | End: 2023-10-11
Attending: FAMILY MEDICINE | Admitting: FAMILY MEDICINE
Payer: COMMERCIAL

## 2023-10-11 DIAGNOSIS — O09.91 SUPERVISION OF HIGH RISK PREGNANCY IN FIRST TRIMESTER: ICD-10-CM

## 2023-10-11 PROCEDURE — 76801 OB US < 14 WKS SINGLE FETUS: CPT

## 2023-10-25 ENCOUNTER — HOSPITAL ENCOUNTER (OUTPATIENT)
Dept: ULTRASOUND IMAGING | Facility: CLINIC | Age: 33
Discharge: HOME | End: 2023-10-25
Attending: FAMILY MEDICINE | Admitting: FAMILY MEDICINE
Payer: COMMERCIAL

## 2023-10-25 DIAGNOSIS — O09.91 SUPERVISION OF HIGH RISK PREGNANCY IN FIRST TRIMESTER: ICD-10-CM

## 2023-10-25 PROCEDURE — 76801 OB US < 14 WKS SINGLE FETUS: CPT

## 2023-11-08 ENCOUNTER — APPOINTMENT (OUTPATIENT)
Dept: ULTRASOUND IMAGING | Facility: CLINIC | Age: 33
End: 2023-11-08
Attending: EMERGENCY MEDICINE
Payer: COMMERCIAL

## 2023-11-08 ENCOUNTER — HOSPITAL ENCOUNTER (EMERGENCY)
Facility: CLINIC | Age: 33
Discharge: HOME | End: 2023-11-08
Attending: EMERGENCY MEDICINE | Admitting: EMERGENCY MEDICINE
Payer: COMMERCIAL

## 2023-11-08 VITALS
WEIGHT: 153.8 LBS | OXYGEN SATURATION: 98 % | BODY MASS INDEX: 24.14 KG/M2 | HEART RATE: 60 BPM | RESPIRATION RATE: 15 BRPM | HEIGHT: 67 IN | DIASTOLIC BLOOD PRESSURE: 70 MMHG | TEMPERATURE: 98.7 F | SYSTOLIC BLOOD PRESSURE: 113 MMHG

## 2023-11-08 DIAGNOSIS — O03.4 INCOMPLETE MISCARRIAGE: ICD-10-CM

## 2023-11-08 LAB
ALBUMIN UR-MCNC: NEGATIVE MG/DL
ANION GAP SERPL CALCULATED.3IONS-SCNC: 13 MMOL/L (ref 7–15)
APPEARANCE UR: CLEAR
BASOPHILS # BLD AUTO: 0.1 10E3/UL (ref 0–0.2)
BASOPHILS NFR BLD AUTO: 1 %
BILIRUB UR QL STRIP: NEGATIVE
BUN SERPL-MCNC: 9 MG/DL (ref 6–20)
CALCIUM SERPL-MCNC: 9.5 MG/DL (ref 8.6–10)
CHLORIDE SERPL-SCNC: 102 MMOL/L (ref 98–107)
COLOR UR AUTO: ABNORMAL
CREAT SERPL-MCNC: 0.6 MG/DL (ref 0.51–0.95)
DEPRECATED HCO3 PLAS-SCNC: 22 MMOL/L (ref 22–29)
EGFRCR SERPLBLD CKD-EPI 2021: >90 ML/MIN/1.73M2
EOSINOPHIL # BLD AUTO: 0.4 10E3/UL (ref 0–0.7)
EOSINOPHIL NFR BLD AUTO: 4 %
ERYTHROCYTE [DISTWIDTH] IN BLOOD BY AUTOMATED COUNT: 13.5 % (ref 10–15)
GLUCOSE SERPL-MCNC: 120 MG/DL (ref 70–99)
GLUCOSE UR STRIP-MCNC: NEGATIVE MG/DL
HCG INTACT+B SERPL-ACNC: ABNORMAL MIU/ML
HCT VFR BLD AUTO: 40.5 % (ref 35–47)
HGB BLD-MCNC: 13.7 G/DL (ref 11.7–15.7)
HGB UR QL STRIP: ABNORMAL
IMM GRANULOCYTES # BLD: 0 10E3/UL
IMM GRANULOCYTES NFR BLD: 0 %
KETONES UR STRIP-MCNC: NEGATIVE MG/DL
LEUKOCYTE ESTERASE UR QL STRIP: NEGATIVE
LYMPHOCYTES # BLD AUTO: 3.5 10E3/UL (ref 0.8–5.3)
LYMPHOCYTES NFR BLD AUTO: 34 %
MCH RBC QN AUTO: 29.9 PG (ref 26.5–33)
MCHC RBC AUTO-ENTMCNC: 33.8 G/DL (ref 31.5–36.5)
MCV RBC AUTO: 88 FL (ref 78–100)
MONOCYTES # BLD AUTO: 0.7 10E3/UL (ref 0–1.3)
MONOCYTES NFR BLD AUTO: 7 %
MUCOUS THREADS #/AREA URNS LPF: PRESENT /LPF
NEUTROPHILS # BLD AUTO: 5.4 10E3/UL (ref 1.6–8.3)
NEUTROPHILS NFR BLD AUTO: 54 %
NITRATE UR QL: NEGATIVE
NRBC # BLD AUTO: 0 10E3/UL
NRBC BLD AUTO-RTO: 0 /100
PH UR STRIP: 6 [PH] (ref 5–7)
PLATELET # BLD AUTO: 268 10E3/UL (ref 150–450)
POTASSIUM SERPL-SCNC: 3.9 MMOL/L (ref 3.4–5.3)
RBC # BLD AUTO: 4.58 10E6/UL (ref 3.8–5.2)
RBC URINE: <1 /HPF
SODIUM SERPL-SCNC: 137 MMOL/L (ref 135–145)
SP GR UR STRIP: 1.02 (ref 1–1.03)
SQUAMOUS EPITHELIAL: 1 /HPF
UROBILINOGEN UR STRIP-MCNC: <2 MG/DL
WBC # BLD AUTO: 10.1 10E3/UL (ref 4–11)
WBC URINE: 1 /HPF

## 2023-11-08 PROCEDURE — 80048 BASIC METABOLIC PNL TOTAL CA: CPT | Performed by: EMERGENCY MEDICINE

## 2023-11-08 PROCEDURE — 85025 COMPLETE CBC W/AUTO DIFF WBC: CPT | Performed by: EMERGENCY MEDICINE

## 2023-11-08 PROCEDURE — 81001 URINALYSIS AUTO W/SCOPE: CPT | Performed by: EMERGENCY MEDICINE

## 2023-11-08 PROCEDURE — 84702 CHORIONIC GONADOTROPIN TEST: CPT | Performed by: EMERGENCY MEDICINE

## 2023-11-08 PROCEDURE — 76801 OB US < 14 WKS SINGLE FETUS: CPT

## 2023-11-08 PROCEDURE — 99285 EMERGENCY DEPT VISIT HI MDM: CPT | Mod: 25

## 2023-11-08 PROCEDURE — 36415 COLL VENOUS BLD VENIPUNCTURE: CPT | Performed by: EMERGENCY MEDICINE

## 2023-11-08 ASSESSMENT — ACTIVITIES OF DAILY LIVING (ADL): ADLS_ACUITY_SCORE: 33

## 2023-11-09 NOTE — ED PROVIDER NOTES
EMERGENCY DEPARTMENT ENCOUnter      NAME: Lauren Santiago  AGE: 33 year old female  YOB: 1990  MRN: [id number]  EVALUATION DATE & TIME: No admission date for patient encounter.    PCP: Amee Campa    ED PROVIDER: Nohemi Cisneros MD      Chief Complaint   Patient presents with    Vaginal Bleeding - Pregnant     8 weeks pregnant         FINAL IMPRESSION:  1. Incomplete miscarriage          ED COURSE & MEDICAL DECISION MAKING:      In summary, the patient is a 33-year-old female that presents to the emergency department for evaluation of vaginal bleeding in pregnancy thought secondary to an incomplete miscarriage as evidenced from her ultrasound findings..  Recommended close outpatient follow-up with her OB.    9:19 PM Due to a shortage of available emergency department rooms, with the patient's permission I met with the patient for the initial interview and physical examination in a triage room. Discussed plan for treatment and workup in the ED.   review of previous medical records reveals that the patient's blood type is Rh+  2115-updated and discharged patient    Medical Decision Making    History:  Supplemental history from: Documented in chart, if applicable  External Record(s) reviewed: Documented in chart, if applicable.    Work Up:  Chart documentation includes differential considered and any EKGs or imaging independently interpreted by provider, where specified.  In additional to work up documented, I considered the following work up: Documented in chart, if applicable.    External consultation:  Discussion of management with another provider: Documented in chart, if applicable    Complicating factors:  Care impacted by chronic illness: Mental Health  Care affected by social determinants of health: N/A    Disposition considerations: Discharge. No recommendations on prescription strength medication(s). See documentation for any additional details.          At the conclusion of the  encounter I discussed the results of all of the tests and the disposition. The questions were answered. The patient or family acknowledged understanding and was agreeable with the care plan.         MEDICATIONS GIVEN IN THE EMERGENCY:  Medications - No data to display    NEW PRESCRIPTIONS STARTED AT TODAY'S ER VISIT  Discharge Medication List as of 2023 11:17 PM             =================================================================    HPI        Lauren Santiago is a 33 year old female with a pertinent history of anxiety, amphetamine use disorder, GERD, PTSD, and  who presents to this ED via walk in for evaluation of vaginal bleeding while pregnant.  Began having spotting yesterday which has become a little more heavy today.  He states that it was pink and now has become more red.  She has low abdominal cramping which is mild.  Not have any nausea, vomiting or diarrhea.  Denies any trauma.  The patient's blood type is O+.        REVIEW OF SYSTEMS     Constitutional:  Denies fever or chills  HENT:  Denies sore throat   Respiratory:  Denies cough or shortness of breath   Cardiovascular:  Denies chest pain or palpitations  GI:  Denies nausea, or vomiting. Endorses lower abdominal pain   : Endorses vaginal bleeding  Musculoskeletal:  Denies any new extremity pain   Skin:  Denies rash   Neurologic:  Denies headache, focal weakness or sensory changes    All other systems reviewed and are negative      PAST MEDICAL HISTORY:  Past Medical History:   Diagnosis Date    Anxiety 2015    GERD (gastroesophageal reflux disease) 2015    Headache     Migraine headache 2015    Mood disorder (H24) 2015    Opiate abuse, episodic (H)     Stopped and detoxed.    Panic disorder 2015    PTSD (post-traumatic stress disorder)        PAST SURGICAL HISTORY:  History reviewed. No pertinent surgical history.        CURRENT MEDICATIONS:    buprenorphine HCl-naloxone HCl (SUBOXONE) 8-2 MG per  film  Prenatal Vit-Fe Fumarate-FA (PRENATAL MULTIVITAMIN W/IRON) 27-0.8 MG tablet  vitamin D3 (CHOLECALCIFEROL) 50 mcg (2000 units) tablet        ALLERGIES:  No Known Allergies    FAMILY HISTORY:  Family History   Problem Relation Age of Onset    Ovarian Cancer Mother         In remission as of 2017    Substance Abuse Paternal Grandfather     Schizophrenia Brother     Substance Abuse Brother     Depression Brother     Mental Illness Brother     Depression Other        SOCIAL HISTORY:   Social History     Socioeconomic History    Marital status: Legally      Spouse name: None    Number of children: None    Years of education: None    Highest education level: None   Tobacco Use    Smoking status: Every Day     Packs/day: 0.50     Years: 5.00     Additional pack years: 0.00     Total pack years: 2.50     Types: Cigarettes     Start date: 3/14/2023     Last attempt to quit: 2012     Years since quittin.8    Smokeless tobacco: Current    Tobacco comments:     e-cig   Substance and Sexual Activity    Alcohol use: No    Drug use: Not Currently     Types: Benzodiazepines     Comment: Drug use: In treatment currently (2017); wants help geting off of Suboxone and Valium;     Sexual activity: Not Currently     Partners: Male   Social History Narrative    Lives with family.      Social Determinants of Health     Financial Resource Strain: Low Risk  (2023)    Financial Resource Strain     Within the past 12 months, have you or your family members you live with been unable to get utilities (heat, electricity) when it was really needed?: No   Food Insecurity: Low Risk  (2023)    Food Insecurity     Within the past 12 months, did you worry that your food would run out before you got money to buy more?: No     Within the past 12 months, did the food you bought just not last and you didn t have money to get more?: No   Transportation Needs: Low Risk  (2023)    Transportation Needs     Within the  "past 12 months, has lack of transportation kept you from medical appointments, getting your medicines, non-medical meetings or appointments, work, or from getting things that you need?: No   Housing Stability: Low Risk  (9/27/2023)    Housing Stability     Do you have housing? : Yes     Are you worried about losing your housing?: No       VITALS:  Patient Vitals for the past 24 hrs:   BP Temp Temp src Pulse Resp SpO2 Height Weight   11/08/23 2020 113/70 98.7  F (37.1  C) Temporal 60 15 98 % 1.702 m (5' 7\") 69.8 kg (153 lb 12.8 oz)       PHYSICAL EXAM    Constitutional:  Well developed, Well nourished,  HENT:  Normocephalic, Atraumatic, Bilateral external ears normal, Oropharynx moist, Nose normal.   Neck:  Normal range of motion, No meningismus, No stridor.   Eyes:  EOMI, Conjunctiva normal, No discharge.   Respiratory:  Normal breath sounds, No respiratory distress, No wheezing, No chest tenderness.   Cardiovascular:  Normal heart rate, Normal rhythm, No murmurs  GI:  Soft, No tenderness, No guarding, No CVA tenderness.   Musculoskeletal:  Neurovascularly intact distally, No edema, No tenderness, No cyanosis, Good range of motion in all major joints.    Integument:  Warm, Dry, No erythema, No rash.   Lymphatic:  No lymphadenopathy noted.   Neurologic:  Alert & oriented , Normal motor function, Normal sensory function, No focal deficits noted.   Psychiatric:  Affect normal, Judgment normal, Mood normal.      LAB:  All pertinent labs reviewed and interpreted.  Results for orders placed or performed during the hospital encounter of 11/08/23   OB  US 1st trimester w transvag    Impression    IMPRESSION:   1.  Stable or decreased in size of the fetal pole compared to exam from 2 weeks ago. No cardiac activity identified.  2.  Findings diagnostic of first trimester fetal demise..       Basic metabolic panel   Result Value Ref Range    Sodium 137 135 - 145 mmol/L    Potassium 3.9 3.4 - 5.3 mmol/L    Chloride 102 98 - 107 " mmol/L    Carbon Dioxide (CO2) 22 22 - 29 mmol/L    Anion Gap 13 7 - 15 mmol/L    Urea Nitrogen 9.0 6.0 - 20.0 mg/dL    Creatinine 0.60 0.51 - 0.95 mg/dL    GFR Estimate >90 >60 mL/min/1.73m2    Calcium 9.5 8.6 - 10.0 mg/dL    Glucose 120 (H) 70 - 99 mg/dL   UA with Microscopic reflex to Culture    Specimen: Urine, Midstream   Result Value Ref Range    Color Urine Light Yellow Colorless, Straw, Light Yellow, Yellow    Appearance Urine Clear Clear    Glucose Urine Negative Negative mg/dL    Bilirubin Urine Negative Negative    Ketones Urine Negative Negative mg/dL    Specific Gravity Urine 1.017 1.001 - 1.030    Blood Urine >1.0 mg/dL (A) Negative    pH Urine 6.0 5.0 - 7.0    Protein Albumin Urine Negative Negative mg/dL    Urobilinogen Urine <2.0 <2.0 mg/dL    Nitrite Urine Negative Negative    Leukocyte Esterase Urine Negative Negative    Mucus Urine Present (A) None Seen /LPF    RBC Urine <1 <=2 /HPF    WBC Urine 1 <=5 /HPF    Squamous Epithelials Urine 1 <=1 /HPF   HCG quantitative pregnancy   Result Value Ref Range    hCG Quantitative 14,002 (H) <5 mIU/mL   CBC with platelets and differential   Result Value Ref Range    WBC Count 10.1 4.0 - 11.0 10e3/uL    RBC Count 4.58 3.80 - 5.20 10e6/uL    Hemoglobin 13.7 11.7 - 15.7 g/dL    Hematocrit 40.5 35.0 - 47.0 %    MCV 88 78 - 100 fL    MCH 29.9 26.5 - 33.0 pg    MCHC 33.8 31.5 - 36.5 g/dL    RDW 13.5 10.0 - 15.0 %    Platelet Count 268 150 - 450 10e3/uL    % Neutrophils 54 %    % Lymphocytes 34 %    % Monocytes 7 %    % Eosinophils 4 %    % Basophils 1 %    % Immature Granulocytes 0 %    NRBCs per 100 WBC 0 <1 /100    Absolute Neutrophils 5.4 1.6 - 8.3 10e3/uL    Absolute Lymphocytes 3.5 0.8 - 5.3 10e3/uL    Absolute Monocytes 0.7 0.0 - 1.3 10e3/uL    Absolute Eosinophils 0.4 0.0 - 0.7 10e3/uL    Absolute Basophils 0.1 0.0 - 0.2 10e3/uL    Absolute Immature Granulocytes 0.0 <=0.4 10e3/uL    Absolute NRBCs 0.0 10e3/uL       RADIOLOGY:  I have independently reviewed  and interpreted the above imaging, pending the final radiology read.  OB  US 1st trimester w transvag   Final Result   IMPRESSION:    1.  Stable or decreased in size of the fetal pole compared to exam from 2 weeks ago. No cardiac activity identified.   2.  Findings diagnostic of first trimester fetal demise..                    I, Lauren Garcia, am serving as a scribe to document services personally performed by Dr. Cisneros based on my observation and the provider's statements to me. I, Nohemi Cisneros MD attest that Lauren Garcia is acting in a scribe capacity, has observed my performance of the services and has documented them in accordance with my direction.    Nohemi Cisneros MD  Emergency Medicine  [facility] EMERGENCY ROOM  [address] [facility] [id number] [phone]  Dept: [phone]      Nohemi Cisneros MD  11/08/23 0162

## 2023-11-09 NOTE — ED TRIAGE NOTES
"  Pt reports some spotting that was \"light and light red (pink) earlier today and now is somewhat darker red. Pt endorses some cramping in the medial abdomen.  Pt denies any N/V   Triage Assessment (Adult)       Row Name 11/08/23 2023          Triage Assessment    Airway WDL WDL        Respiratory WDL    Respiratory WDL WDL        Skin Circulation/Temperature WDL    Skin Circulation/Temperature WDL WDL        Cardiac WDL    Cardiac WDL WDL        Peripheral/Neurovascular WDL    Peripheral Neurovascular WDL WDL        Cognitive/Neuro/Behavioral WDL    Cognitive/Neuro/Behavioral WDL WDL                     "

## 2023-11-09 NOTE — DISCHARGE INSTRUCTIONS
Please follow-up with your OB within the next couple days for a recheck  Tylenol 650 mg every 4 hours as needed for pain  Ibuprofen 600 mg every 6 hours as needed for pain  Drink plenty of fluids daily.

## 2023-12-07 ENCOUNTER — TELEPHONE (OUTPATIENT)
Dept: FAMILY MEDICINE | Facility: CLINIC | Age: 33
End: 2023-12-07
Payer: COMMERCIAL

## 2023-12-07 ENCOUNTER — NURSE TRIAGE (OUTPATIENT)
Dept: FAMILY MEDICINE | Facility: CLINIC | Age: 33
End: 2023-12-07
Payer: COMMERCIAL

## 2023-12-07 NOTE — TELEPHONE ENCOUNTER
"    Nurse Triage SBAR    Is this a 2nd Level Triage? YES, LICENSED PRACTITIONER REVIEW IS REQUIRED    Situation: Pt had miscarriage at 14 weeks gestation- seen in ER 23    Background:  - Pt stopped bleeding about 2 weeks for 1 day and then it started again.   Pt describes flow as heavy - using pads for control-  using about 1 pad an hour     Assessment:     1. ONSET: \"When did this bleeding start?\"        23  2. DESCRIPTION: \"Describe the bleeding that you are having.\" \"How much bleeding is there?\"     - SPOTTING: spotting, or pinkish / brownish mucous discharge; does not fill panty liner or pad     - MILD:  less than 1 pad / hour; less than patient's usual menstrual bleeding    - MODERATE: 1-2 pads / hour; 1 menstrual cup every 6 hours; small-medium blood clots (e.g., pea, grape, small coin)    - SEVERE: soaking 2 or more pads/hour for 2 or more hours; 1 menstrual cup every 2 hours; bleeding not contained by pads or continuous red blood from vagina; large blood clots (e.g., golf ball, large coin)       Moderate   3. ABDOMINAL PAIN SEVERITY: If present, ask: \"How bad is it?\"  (e.g., Scale 1-10; mild, moderate, or severe)    - MILD (1-3): doesn't interfere with normal activities, abdomen soft and not tender to touch     - MODERATE (4-7): interferes with normal activities or awakens from sleep, abdomen tender to touch     - SEVERE (8-10): excruciating pain, doubled over, unable to do any normal activities   Moderate   4. PREGNANCY: \"Do you know how many weeks or months pregnant you are?\" \"When was the first day of your last normal menstrual period?\"      Patient was 14 weeks gestation at time of incomplete miscarriage   5. HEMODYNAMIC STATUS: \"Are you weak or feeling lightheaded?\" If Yes, ask: \"Can you stand and walk normally?\"       Feels week and dizzy -did recently complete subloxone program - but is also going through 1-2 pads every 2 hours   6. OTHER SYMPTOMS: \"What other symptoms are you having " "with the bleeding?\" (e.g., passed tissue, vaginal discharge, fever, menstrual-type cramps)      Protocol Recommended Disposition:    Go to ER now    Recommendation: Advised patient to go to ER for further evaluation and care.  Will route note to Dr. Gómez so she is aware.  Patient agrees to disposition  Routed to provider    Does the patient meet one of the following criteria for ADS visit consideration? No  Reason for Disposition   SEVERE vaginal bleeding (e.g., soaking 2 pads or tampons per hour and present 2 or more hours; 1 menstrual cup every 2 hours)   MODERATE vaginal bleeding (i.e., soaking 1 pad / hour, clots) and pregnant > 12 weeks    Additional Information   Negative: Shock suspected (e.g., cold/pale/clammy skin, too weak to stand, low BP, rapid pulse)   Negative: Difficult to awaken or acting confused (e.g., disoriented, slurred speech)   Negative: Passed out (i.e., fainted, collapsed and was not responding)   Negative: Sounds like a life-threatening emergency to the triager   Negative: Vaginal bleeding and pregnant 20 or more weeks   Negative: SEVERE abdominal pain (e.g., excruciating)   Negative: Not pregnant or pregnancy status unknown    Protocols used: Pregnancy - Vaginal Bleeding Less Than 20 Weeks EGA-A-OH  Patient agrees and will go to ER- RN offered 911. But patient has . She will let us know how she is doing for follow up apt with Dr. Gómez.      "

## 2023-12-07 NOTE — TELEPHONE ENCOUNTER
RN huddled with Dr. Gómez who agreed that patient should go to ER for evaluation;   Denny Vu, ESTEVANN, RN, PHN, PED-BC, CPEN  [facility]  12/07/23

## 2024-01-18 ENCOUNTER — HOSPITAL ENCOUNTER (EMERGENCY)
Facility: CLINIC | Age: 34
Discharge: PSYCHIATRIC OR SUBSTANCE USE FACILITY | End: 2024-01-18
Attending: EMERGENCY MEDICINE | Admitting: EMERGENCY MEDICINE
Payer: COMMERCIAL

## 2024-01-18 VITALS
OXYGEN SATURATION: 100 % | SYSTOLIC BLOOD PRESSURE: 130 MMHG | RESPIRATION RATE: 16 BRPM | HEIGHT: 67 IN | DIASTOLIC BLOOD PRESSURE: 65 MMHG | TEMPERATURE: 97.9 F | HEART RATE: 70 BPM | WEIGHT: 135 LBS | BODY MASS INDEX: 21.19 KG/M2

## 2024-01-18 DIAGNOSIS — F32.A DEPRESSION, UNSPECIFIED DEPRESSION TYPE: ICD-10-CM

## 2024-01-18 DIAGNOSIS — R45.851 PASSIVE SUICIDAL IDEATIONS: ICD-10-CM

## 2024-01-18 DIAGNOSIS — F19.11 HISTORY OF SUBSTANCE ABUSE (H): ICD-10-CM

## 2024-01-18 PROCEDURE — 99281 EMR DPT VST MAYX REQ PHY/QHP: CPT

## 2024-01-18 NOTE — ED NOTES
Patient left, does not want to talk with any mental health providers, is asking for mental health medications. Patient doesn't want to go to [facility] or any other place where she can't smoke, patient offered nicotine replacement, and patient refused.

## 2024-01-18 NOTE — ED TRIAGE NOTES
Patient here with suicidal thoughts, has no plans at this time. Patient also states she has schizophrenia medications, but they aren't helping her. Patient is currently not taking her medications.

## 2024-01-18 NOTE — ED PROVIDER NOTES
"  History     Chief Complaint:  Suicidal and Psychiatric Evaluation       HPI   Lauren Santiago is a 33 year old female presents for psychiatric evaluation.  Patient reports that she has been off of her prescribed medications for at least the last 1 week and follows with psychiatrist Dr. Marc at [facility].  Currently patient is in substance abuse rehab program.  Presents today requesting to be put on medication for her schizophrenia.  Endorses auditory/visual hallucinations, passive suicidal thoughts (no plan), and depression.  She reports that her previous medications have not worked for her.  Also is requesting to smoke cigarettes.      Independent Historian:   None - Patient Only    Review of External Notes:   N/A      Medications:    buprenorphine HCl-naloxone HCl (SUBOXONE) 8-2 MG per film  Prenatal Vit-Fe Fumarate-FA (PRENATAL MULTIVITAMIN W/IRON) 27-0.8 MG tablet  vitamin D3 (CHOLECALCIFEROL) 50 mcg (2000 units) tablet        Past Medical History:    Past Medical History:   Diagnosis Date    Anxiety 6/16/2015    GERD (gastroesophageal reflux disease) 5/23/2015    Headache     Migraine headache 6/23/2015    Mood disorder (H24) 6/16/2015    Opiate abuse, episodic (H)     Panic disorder 6/23/2015    PTSD (post-traumatic stress disorder) 2009       Past Surgical History:    No past surgical history on file.     Physical Exam   Patient Vitals for the past 24 hrs:   BP Temp Temp src Pulse Resp SpO2 Height Weight   01/18/24 1158 -- -- -- -- -- -- -- 61.2 kg (135 lb)   01/18/24 1157 130/65 97.9  F (36.6  C) Oral 70 16 100 % 1.702 m (5' 7\") --        Physical Exam  General: Patient in mild distress.  Alert and cooperative with exam. Normal mentation.  Anxious appearance  HEENT: NC/AT. Conjunctiva without injection or scleral icterus. External ears normal.  Respiratory: Breathing comfortably on room air  CV: Normal rate, all extremities well perfused  GI:  Non-distended abdomen  Skin: Warm, dry, no " rashes/open wounds on exposed skin  Musculoskeletal: No obvious deformities  Neuro: Alert, answers questions appropriately. No gross motor deficits  Psychiatric: Endorses auditory and visual hallucinations, depression, passive suicidal ideation.  Denies homicidal ideation      Emergency Department Course     Emergency Department Course & Assessments:    Interventions:  Medications - No data to display     Independent Interpretation (X-rays, CTs, rhythm strip):  None    Consultations/Discussion of Management or Tests:  None        Social Determinants of Health affecting care:   None    Disposition:  Patient eloped    Impression & Plan      Medical Decision Making:  Patient is a 33-year-old female with history of schizophrenia who presents with concern for worsening schizophrenia and is requesting that she be placed on medications.  I evaluated the patient in the triage area.  Patient denied overt suicidal ideation or homicidal ideation.  She does not have any specific plans to hurt herself and reports that she would not attempt to hurt herself.  Patient repeatedly requested to smoke and I informed her that this was not allowed on hospital grounds though I would be able to order her nicotine patch or gum; she deferred this.  She again requested that I write her prescription to treat her schizophrenia.  I discussed with her that first she would need to undergo mental health evaluation with [facility].  Patient refused this and walked out of the emergency department.  No indication for mental health hold.  Patient had decision-making capacity and was mentating normally at time of elopement.      Diagnosis:    ICD-10-CM    1. Depression, unspecified depression type  F32.A       2. History of substance abuse (H)  F19.11       3. Passive suicidal ideations  R45.851                 Ashish Wellington,   01/18/24 1218

## 2024-03-11 ENCOUNTER — HOSPITAL ENCOUNTER (EMERGENCY)
Facility: CLINIC | Age: 34
Discharge: HOME | End: 2024-03-12
Attending: EMERGENCY MEDICINE | Admitting: EMERGENCY MEDICINE
Payer: COMMERCIAL

## 2024-03-11 DIAGNOSIS — F19.950: ICD-10-CM

## 2024-03-11 LAB
ALBUMIN SERPL BCG-MCNC: 4 G/DL (ref 3.5–5.2)
ALP SERPL-CCNC: 64 U/L (ref 40–150)
ALT SERPL W P-5'-P-CCNC: 15 U/L (ref 0–50)
AMPHETAMINES UR QL SCN: ABNORMAL
ANION GAP SERPL CALCULATED.3IONS-SCNC: 8 MMOL/L (ref 7–15)
AST SERPL W P-5'-P-CCNC: 22 U/L (ref 0–45)
BARBITURATES UR QL SCN: ABNORMAL
BASOPHILS # BLD AUTO: 0.1 10E3/UL (ref 0–0.2)
BASOPHILS NFR BLD AUTO: 1 %
BENZODIAZ UR QL SCN: ABNORMAL
BILIRUB SERPL-MCNC: 0.6 MG/DL
BUN SERPL-MCNC: 15.4 MG/DL (ref 6–20)
BZE UR QL SCN: ABNORMAL
CALCIUM SERPL-MCNC: 9 MG/DL (ref 8.6–10)
CANNABINOIDS UR QL SCN: ABNORMAL
CHLORIDE SERPL-SCNC: 103 MMOL/L (ref 98–107)
CREAT SERPL-MCNC: 0.67 MG/DL (ref 0.51–0.95)
DEPRECATED HCO3 PLAS-SCNC: 27 MMOL/L (ref 22–29)
EGFRCR SERPLBLD CKD-EPI 2021: >90 ML/MIN/1.73M2
EOSINOPHIL # BLD AUTO: 0.4 10E3/UL (ref 0–0.7)
EOSINOPHIL NFR BLD AUTO: 3 %
ERYTHROCYTE [DISTWIDTH] IN BLOOD BY AUTOMATED COUNT: 13.8 % (ref 10–15)
ETHANOL SERPL-MCNC: <0.01 G/DL
FENTANYL UR QL: ABNORMAL
GLUCOSE SERPL-MCNC: 106 MG/DL (ref 70–99)
HCG SERPL QL: NEGATIVE
HCT VFR BLD AUTO: 37.6 % (ref 35–47)
HGB BLD-MCNC: 12.4 G/DL (ref 11.7–15.7)
IMM GRANULOCYTES # BLD: 0 10E3/UL
IMM GRANULOCYTES NFR BLD: 0 %
INR PPP: 1.15 (ref 0.85–1.15)
LYMPHOCYTES # BLD AUTO: 2.9 10E3/UL (ref 0.8–5.3)
LYMPHOCYTES NFR BLD AUTO: 26 %
MCH RBC QN AUTO: 30.4 PG (ref 26.5–33)
MCHC RBC AUTO-ENTMCNC: 33 G/DL (ref 31.5–36.5)
MCV RBC AUTO: 92 FL (ref 78–100)
MONOCYTES # BLD AUTO: 0.8 10E3/UL (ref 0–1.3)
MONOCYTES NFR BLD AUTO: 8 %
NEUTROPHILS # BLD AUTO: 6.8 10E3/UL (ref 1.6–8.3)
NEUTROPHILS NFR BLD AUTO: 62 %
NRBC # BLD AUTO: 0 10E3/UL
NRBC BLD AUTO-RTO: 0 /100
OPIATES UR QL SCN: ABNORMAL
PCP QUAL URINE (ROCHE): ABNORMAL
PLATELET # BLD AUTO: 262 10E3/UL (ref 150–450)
POTASSIUM SERPL-SCNC: 4.2 MMOL/L (ref 3.4–5.3)
PROT SERPL-MCNC: 6.4 G/DL (ref 6.4–8.3)
RBC # BLD AUTO: 4.08 10E6/UL (ref 3.8–5.2)
SODIUM SERPL-SCNC: 138 MMOL/L (ref 135–145)
WBC # BLD AUTO: 11 10E3/UL (ref 4–11)

## 2024-03-11 PROCEDURE — 250N000011 HC RX IP 250 OP 636

## 2024-03-11 PROCEDURE — 80053 COMPREHEN METABOLIC PANEL: CPT | Performed by: EMERGENCY MEDICINE

## 2024-03-11 PROCEDURE — 85025 COMPLETE CBC W/AUTO DIFF WBC: CPT | Performed by: EMERGENCY MEDICINE

## 2024-03-11 PROCEDURE — 99291 CRITICAL CARE FIRST HOUR: CPT | Performed by: EMERGENCY MEDICINE

## 2024-03-11 PROCEDURE — 84703 CHORIONIC GONADOTROPIN ASSAY: CPT | Performed by: EMERGENCY MEDICINE

## 2024-03-11 PROCEDURE — 99285 EMERGENCY DEPT VISIT HI MDM: CPT | Performed by: EMERGENCY MEDICINE

## 2024-03-11 PROCEDURE — 80307 DRUG TEST PRSMV CHEM ANLYZR: CPT | Performed by: EMERGENCY MEDICINE

## 2024-03-11 PROCEDURE — 36415 COLL VENOUS BLD VENIPUNCTURE: CPT | Performed by: EMERGENCY MEDICINE

## 2024-03-11 PROCEDURE — 85610 PROTHROMBIN TIME: CPT | Performed by: EMERGENCY MEDICINE

## 2024-03-11 PROCEDURE — 250N000013 HC RX MED GY IP 250 OP 250 PS 637: Performed by: EMERGENCY MEDICINE

## 2024-03-11 PROCEDURE — 82077 ASSAY SPEC XCP UR&BREATH IA: CPT | Performed by: EMERGENCY MEDICINE

## 2024-03-11 RX ORDER — OLANZAPINE 10 MG/2ML
INJECTION, POWDER, FOR SOLUTION INTRAMUSCULAR
Status: COMPLETED
Start: 2024-03-11 | End: 2024-03-11

## 2024-03-11 RX ORDER — OLANZAPINE 10 MG/2ML
10 INJECTION, POWDER, FOR SOLUTION INTRAMUSCULAR ONCE
Status: COMPLETED | OUTPATIENT
Start: 2024-03-11 | End: 2024-03-11

## 2024-03-11 RX ORDER — LORAZEPAM 0.5 MG/1
2 TABLET ORAL ONCE
Status: COMPLETED | OUTPATIENT
Start: 2024-03-11 | End: 2024-03-11

## 2024-03-11 RX ORDER — SODIUM CHLORIDE 9 MG/ML
INJECTION, SOLUTION INTRAVENOUS CONTINUOUS
Status: DISCONTINUED | OUTPATIENT
Start: 2024-03-11 | End: 2024-03-12 | Stop reason: HOSPADM

## 2024-03-11 RX ADMIN — OLANZAPINE 10 MG: 10 INJECTION, POWDER, FOR SOLUTION INTRAMUSCULAR at 17:56

## 2024-03-11 RX ADMIN — LORAZEPAM 2 MG: 0.5 TABLET ORAL at 19:21

## 2024-03-11 ASSESSMENT — ACTIVITIES OF DAILY LIVING (ADL)
ADLS_ACUITY_SCORE: 35

## 2024-03-11 NOTE — ED TRIAGE NOTES
PT says she is here because her heart stops sometimes and she needs to make sure it restarts. She says it stops because it is a tyler thing and she cannot really explain it. She is scared her baby is going to die. Pt says she does not know how pregnant she is  She says her nose is messed up and she is having a panic attack.

## 2024-03-11 NOTE — ED PROVIDER NOTES
[facility] [address] EMERGENCY DEPARTMENT ([facility])    3/11/24       ED PROVIDER NOTE      History     Chief Complaint   Patient presents with    Epistaxis    Psychiatric Evaluation     The history is provided by the patient and medical records.     Lauren Santiago is a 34 year old female who presents emerged department stating that she wants the status of her baby checked, states she has a nose bleed and states that she cannot catch her breath because she is so anxious.  The patient presented to the triage area pacing and being very dramatic to the point where I was called to the triage area to evaluate the patient.  The patient at that time agreed to have an IV placed and to have everything checked out but then subsequently got up and started running around the ER yelling and swearing at and demanding to leave.  The patient is not making sense, has tangential thoughts and is not exercising good judgment.  In addition Epic records reveal the patient was pregnant in November with an undetermined disposition and rather was found to in December to have no longer a pregnancy intact.  The patient's quantitative beta-hCG on December 14, 2023 was 2.  The patient at this time will be restrained and a code 21 will be called and the patient will be medicated so that we can further figure out the patient's psychological status and medical status.    Past Medical History  Past Medical History:   Diagnosis Date    Anxiety 6/16/2015    GERD (gastroesophageal reflux disease) 5/23/2015    Headache     Migraine headache 6/23/2015    Mood disorder (H24) 6/16/2015    Opiate abuse, episodic (H)     Stopped and detoxed.    Panic disorder 6/23/2015    PTSD (post-traumatic stress disorder) 2009     No past surgical history on file.    buprenorphine HCl-naloxone HCl (SUBOXONE) 8-2 MG per film  Prenatal Vit-Fe Fumarate-FA (PRENATAL MULTIVITAMIN W/IRON) 27-0.8 MG tablet  vitamin D3 (CHOLECALCIFEROL) 50 mcg (2000 units) tablet      No Known  Allergies    Family History  Family History   Problem Relation Age of Onset    Ovarian Cancer Mother         In remission as of 2017    Substance Abuse Paternal Grandfather     Schizophrenia Brother     Substance Abuse Brother     Depression Brother     Mental Illness Brother     Depression Other      Social History   Social History     Tobacco Use    Smoking status: Every Day     Packs/day: 0.50     Years: 5.00     Additional pack years: 0.00     Total pack years: 2.50     Types: Cigarettes     Start date: 3/14/2023     Last attempt to quit: 2012     Years since quittin.2    Smokeless tobacco: Current    Tobacco comments:     e-cig   Substance Use Topics    Alcohol use: No    Drug use: Not Currently     Types: Benzodiazepines     Comment: Drug use: In treatment currently (2017); wants help geting off of Suboxone and Valium;          A complete review of systems was performed with pertinent positives and negatives noted in the HPI, and all other systems negative.    Physical Exam      Physical Exam  Constitutional:       Comments: Patient was very anxious and erratic and triage.  She had some trickling epistaxis from her nose but was obviously delusional and agitated.   HENT:      Head: Atraumatic.   Eyes:      Pupils: Pupils are equal, round, and reactive to light.   Pulmonary:      Comments: Good aeration bilaterally with hyperventilation  Musculoskeletal:         General: No deformity.   Neurological:      Comments: Moves all extremities with equal strength   Psychiatric:      Comments: Delusional and agitated         ED Course, Procedures, & Data      Procedures       This patient was brought into the ER and started running around the ER yelling screaming and using obscenities.  At that point a code 21 was called and the patient was restrained and given Zyprexa.  Orders Placed This Encounter   Procedures    INR    Comprehensive metabolic panel    Alcohol    HCG qualitative Blood    Peripheral IV  catheter    Give patient Health Officer to Detain information available from Hold/Detain navigator    1:1 [facility]    Diagnostic Evaluation Center (DEC) Assessment Consult Order:    Restraints Violent or Self-Destructive Adult (Age 18 or Older) Seclusion (BH), Wrist - R (BH), Wrist - L (BH), Ankle - R (BH), Ankle - L (BH)    Health Officer Authority to Detain (ASHLYN)    Suicide precautions: Suicide Risk: HIGH; Clinical rationale to override score: modification to the care environment    CBC with platelets differential    Urine Drug Screen     Medications   sodium chloride 0.9% BOLUS 500 mL (has no administration in time range)   sodium chloride 0.9 % infusion (has no administration in time range)   OLANZapine (zyPREXA) injection 10 mg (10 mg Intramuscular $Given 3/11/24 1625)          Critical care was performed.   Critical Care Addendum  My initial assessment, based on my review of nursing observations, focused history, and physical exam, established a high suspicion that Lauren Santiago has severe agitation, which requires immediate intervention, and therefore she is critically ill.     After the initial assessment, the care team behavioral medicine to provide stabilization care. Due to the critical nature of this patient, I reassessed vital signs and mental status multiple times prior to her disposition.     Time also spent performing documentation and coordination of care.     Critical care time (excluding teaching time and procedures): Greater than 30 minutes.     Assessment & Plan      I have reviewed the nursing notes.     At this time the patient's labs and the DEC evaluation are pending.  Patient will be signed out to one of my partners will follow-up with the rest of the patient's evaluation and arrange for the most appropriate disposition.      Working diagnoses:   Toxic psychosis, with delusions (H)       Ventura Scott Md  I, Darren Whiteside, am serving as a trained medical scribe to document services  personally performed by Ventura Scott Md MD based on the provider's statements to me on March 11, 2024.  This document has been checked and approved by the attending provider.    I, Ventura Scott Md MD, was physically present and have reviewed and verified the accuracy of this note documented by Darren Whiteside, medical scribe.      Ventura Scott Md MD    [facility] EMERGENCY DEPARTMENT  3/11/2024     Ventura Scott MD  03/11/24 190

## 2024-03-11 NOTE — CONSULTS
3/11/2024  Lauren Santiago 1990     Writer consulted with ED [facility],  on this date at  6:22 PM. It was determined that pt would not benefit from assessment at this time due to Pt unable able to participate in assessment.    ED will call DEC at [phone] when pt is ready and able to participate in assessment.       Radha Vargas

## 2024-03-11 NOTE — ED NOTES
During triage interview, patient became agitated and demanded to leave. Patient then started pounding on the main doors to be let yet, screaming. When she was unable to open the doors, patient started pacing around the department and got into obs unit; refusing to go to assigned room. Code 21 called and patient was escorted to room with security. Patient received meds per MAR.

## 2024-03-12 VITALS
HEART RATE: 65 BPM | DIASTOLIC BLOOD PRESSURE: 91 MMHG | OXYGEN SATURATION: 100 % | RESPIRATION RATE: 18 BRPM | SYSTOLIC BLOOD PRESSURE: 122 MMHG

## 2024-03-12 ASSESSMENT — ACTIVITIES OF DAILY LIVING (ADL)
ADLS_ACUITY_SCORE: 35

## 2024-03-12 NOTE — PROGRESS NOTES
Code 21 called, RN assessed pt at bedside. Patient was cooperative with taking PO Ativan and voided up to BR. UA collected. Pt now resting comfortably in bed.

## 2024-03-12 NOTE — ED NOTES
3/11/2024  Lauren Santiago 1990     Writer consulted with ED provider on this date at  4:55 AM. It was determined that pt would not benefit from assessment at this time due to Pt unable able to participate in assessment / sleeping.    ED will call DEC at [phone] when pt is ready and able to participate in assessment.     Pratik Artis

## 2024-03-12 NOTE — ED PROVIDER NOTES
I assumed care of this patient from my colleague at 7:30 PM pending DEC assessment and laboratory studies.  Per report patient presented anxious and erratic with initial code 21 called and her requiring Zyprexa.  Patient initially called however then escalated again yelling and nonredirectable requiring oral benzodiazepine.  Question of psychosis NOS possibly substance-induced conveyed.  As per above patient currently monitored in the emergency department pending psychiatric evaluation.    I did evaluate laboratory studies.  Pregnancy test negative.  No significant lecture light abnormalities.  No segment anemia.  Drug screen significant for amphetamines, fentanyl, cannabinoids, and cocaine suspected of contributing to acute psychosis.  Patient signed over to my colleague at 1 AM pending DEC evaluation.     Joel Bravo MD  03/12/24 0035

## 2024-03-12 NOTE — ED NOTES
Emergency Department Patient Sign-out       Brief HPI:  This is a 34 year old female signed out to me by Dr. Dr. Bravo .  See initial ED Provider note for details of the presentation.            Significant Events prior to my assuming care: Patient here with agitation, code 21 called initially.  Awaiting psychiatric evaluation.  Suspecting this may be a drug-induced psychosis.      Exam:   Patient Vitals for the past 24 hrs:   BP Pulse Resp SpO2   03/12/24 0018 104/47 84 16 97 %   03/11/24 2039 -- -- 16 --   03/11/24 2035 104/60 74 -- 98 %           ED RESULTS:   Results for orders placed or performed during the hospital encounter of 03/11/24 (from the past 24 hour(s))   [place] Draw *Canceled*     Status: None ()    Collection Time: 03/11/24  7:31 PM    Narrative    The following orders were created for panel order [place] Draw.  Procedure                               Abnormality         Status                     ---------                               -----------         ------                       Please view results for these tests on the individual orders.   Urine Drug Screen     Status: Abnormal    Collection Time: 03/11/24  7:33 PM    Narrative    The following orders were created for panel order Urine Drug Screen.  Procedure                               Abnormality         Status                     ---------                               -----------         ------                     Urine Drug Screen Panel[[id number]]      Abnormal            Final result                 Please view results for these tests on the individual orders.   Urine Drug Screen Panel     Status: Abnormal    Collection Time: 03/11/24  7:33 PM   Result Value Ref Range    Amphetamines Urine Screen Positive (A) Screen Negative    Barbituates Urine Screen Negative Screen Negative    Benzodiazepine Urine Screen Negative Screen Negative    Cannabinoids Urine Screen Positive (A) Screen Negative    Cocaine Urine Screen Positive (A)  Screen Negative    Fentanyl Qual Urine Screen Positive (A) Screen Negative    Opiates Urine Screen Negative Screen Negative    PCP Urine Screen Negative Screen Negative   CBC with platelets differential     Status: None    Collection Time: 03/11/24 10:13 PM    Narrative    The following orders were created for panel order CBC with platelets differential.  Procedure                               Abnormality         Status                     ---------                               -----------         ------                     CBC with platelets and d...[[id number]]                      Final result                 Please view results for these tests on the individual orders.   INR     Status: Normal    Collection Time: 03/11/24 10:13 PM   Result Value Ref Range    INR 1.15 0.85 - 1.15   Comprehensive metabolic panel     Status: Abnormal    Collection Time: 03/11/24 10:13 PM   Result Value Ref Range    Sodium 138 135 - 145 mmol/L    Potassium 4.2 3.4 - 5.3 mmol/L    Carbon Dioxide (CO2) 27 22 - 29 mmol/L    Anion Gap 8 7 - 15 mmol/L    Urea Nitrogen 15.4 6.0 - 20.0 mg/dL    Creatinine 0.67 0.51 - 0.95 mg/dL    GFR Estimate >90 >60 mL/min/1.73m2    Calcium 9.0 8.6 - 10.0 mg/dL    Chloride 103 98 - 107 mmol/L    Glucose 106 (H) 70 - 99 mg/dL    Alkaline Phosphatase 64 40 - 150 U/L    AST 22 0 - 45 U/L    ALT 15 0 - 50 U/L    Protein Total 6.4 6.4 - 8.3 g/dL    Albumin 4.0 3.5 - 5.2 g/dL    Bilirubin Total 0.6 <=1.2 mg/dL   Alcohol     Status: Normal    Collection Time: 03/11/24 10:13 PM   Result Value Ref Range    Alcohol ethyl <0.01 <=0.01 g/dL   HCG qualitative Blood     Status: Normal    Collection Time: 03/11/24 10:13 PM   Result Value Ref Range    hCG Serum Qualitative Negative Negative   CBC with platelets and differential     Status: None    Collection Time: 03/11/24 10:13 PM   Result Value Ref Range    WBC Count 11.0 4.0 - 11.0 10e3/uL    RBC Count 4.08 3.80 - 5.20 10e6/uL    Hemoglobin 12.4 11.7 - 15.7 g/dL     Hematocrit 37.6 35.0 - 47.0 %    MCV 92 78 - 100 fL    MCH 30.4 26.5 - 33.0 pg    MCHC 33.0 31.5 - 36.5 g/dL    RDW 13.8 10.0 - 15.0 %    Platelet Count 262 150 - 450 10e3/uL    % Neutrophils 62 %    % Lymphocytes 26 %    % Monocytes 8 %    % Eosinophils 3 %    % Basophils 1 %    % Immature Granulocytes 0 %    NRBCs per 100 WBC 0 <1 /100    Absolute Neutrophils 6.8 1.6 - 8.3 10e3/uL    Absolute Lymphocytes 2.9 0.8 - 5.3 10e3/uL    Absolute Monocytes 0.8 0.0 - 1.3 10e3/uL    Absolute Eosinophils 0.4 0.0 - 0.7 10e3/uL    Absolute Basophils 0.1 0.0 - 0.2 10e3/uL    Absolute Immature Granulocytes 0.0 <=0.4 10e3/uL    Absolute NRBCs 0.0 10e3/uL       ED MEDICATIONS:   Medications   sodium chloride 0.9 % infusion ( Intravenous Not Given 3/12/24 0410)   sodium chloride 0.9% BOLUS 500 mL (500 mLs Intravenous Not Given 3/11/24 2301)   OLANZapine (zyPREXA) injection 10 mg (10 mg Intramuscular $Given 3/11/24 1756)   LORazepam (ATIVAN) tablet 2 mg (2 mg Oral $Given 3/11/24 1921)         Impression:    ICD-10-CM    1. Toxic psychosis, with delusions (H)  F19.950           Plan:    Patient is alert and oriented this morning.  She does admit to drug use likely causing agitation last night.  She has told myself and several other she has no thoughts of harming self or others, no hallucinations or voices.  She does not want to stay for mental health evaluation.  She does not want chemical dependency information.  She will be discharged at her request, she knows she can return if she has any further concerns      MD Son Rooney, Kranthi Harvey MD  03/12/24 1798       Kranthi Cline MD  03/12/24 3504

## 2024-03-12 NOTE — DISCHARGE INSTRUCTIONS
Return to the emergency department if you develop worsening symptoms or if you have any further concerns

## 2024-08-05 NOTE — TELEPHONE ENCOUNTER
08/05/24                            Starr VANN Garrett  [address] [facility] [address]    To Whom It May Concern:    This is to certify Starr VANN Garrett was here to  a new ACE wrap for her right knee.                          Maddi Bourne LPN  [facility]  [id number] [facility] [address]  Dept Phone: [phone]       Refill Approved    Rx renewed per Medication Renewal Policy. Medication was last renewed on 9/17/18.    Loretta Mejía, [facility] Triage/Med Refill 11/30/2019     Requested Prescriptions   Pending Prescriptions Disp Refills     KURVELO, 28, 0.15-0.03 mg per tablet [Pharmacy Med Name: KURVELO TABLETS 28S] 84 tablet 0     Sig: TAKE 1 TABLET BY MOUTH DAILY       Oral Contraceptives Protocol Passed - 11/29/2019  9:40 PM        Passed - Visit with PCP or prescribing provider visit in last 12 months      Last office visit with prescriber/PCP: 8/27/2019 Alyssia Scott MD OR same dept: 8/27/2019 Alyssia Scott MD OR same specialty: 8/27/2019 Alyssia Scott MD  Last physical: 1/8/2018 Last MTM visit: Visit date not found   Next visit within 3 mo: Visit date not found  Next physical within 3 mo: Visit date not found  Prescriber OR PCP: Alyssia Beard MD  Last diagnosis associated with med order: 1. Uses birth control  - KURVELO, 28, 0.15-0.03 mg per tablet [Pharmacy Med Name: KURVELO TABLETS 28S]; TAKE 1 TABLET BY MOUTH DAILY  Dispense: 84 tablet; Refill: 0    If protocol passes may refill for 12 months if within 3 months of last provider visit (or a total of 15 months).

## 2024-08-21 ENCOUNTER — OFFICE VISIT (OUTPATIENT)
Dept: FAMILY MEDICINE | Facility: CLINIC | Age: 34
End: 2024-08-21
Payer: COMMERCIAL

## 2024-08-21 VITALS
RESPIRATION RATE: 18 BRPM | TEMPERATURE: 98.1 F | HEART RATE: 81 BPM | OXYGEN SATURATION: 97 % | DIASTOLIC BLOOD PRESSURE: 68 MMHG | SYSTOLIC BLOOD PRESSURE: 107 MMHG

## 2024-08-21 DIAGNOSIS — Z11.3 SCREEN FOR STD (SEXUALLY TRANSMITTED DISEASE): Primary | ICD-10-CM

## 2024-08-21 LAB
ALBUMIN UR-MCNC: NEGATIVE MG/DL
APPEARANCE UR: CLEAR
BILIRUB UR QL STRIP: NEGATIVE
COLOR UR AUTO: YELLOW
GLUCOSE UR STRIP-MCNC: NEGATIVE MG/DL
HGB UR QL STRIP: NEGATIVE
KETONES UR STRIP-MCNC: ABNORMAL MG/DL
LEUKOCYTE ESTERASE UR QL STRIP: NEGATIVE
NITRATE UR QL: NEGATIVE
PH UR STRIP: 6 [PH] (ref 5–8)
SP GR UR STRIP: 1.02 (ref 1–1.03)
UROBILINOGEN UR STRIP-ACNC: 0.2 E.U./DL

## 2024-08-21 PROCEDURE — 99212 OFFICE O/P EST SF 10 MIN: CPT

## 2024-08-21 PROCEDURE — 87491 CHLMYD TRACH DNA AMP PROBE: CPT

## 2024-08-21 PROCEDURE — 87591 N.GONORRHOEAE DNA AMP PROB: CPT

## 2024-08-21 PROCEDURE — 81003 URINALYSIS AUTO W/O SCOPE: CPT

## 2024-08-21 NOTE — PROGRESS NOTES
URGENT CARE  Assessment & Plan   Assessment:   Lauren Santiago is a 34 year old female who's clinical presentation today is consistent with:   1. Screen for STD (sexually transmitted disease)  - UA Macroscopic with reflex to Microscopic and Culture - Clinic Collect  - Chlamydia & Gonorrhea by PCR, GICH/Range - Clinic Collect  Plan:  Educated patient urine analysis was reassuring, and that lab results for STD testing will come back tomorrow, discussed that we will follow-up via MyChart or phone call with results as soon as they become available.  Given that patient is  here for screening (with no known exposures), if results are negative no further treatment is necessary.  We discussed if symptoms are positive a reflux prescription will be sent to her  pharmacy.  Educated patient that if results positive she also needs to make her  sexual partners aware of the diagnosis and recommend they come in for testing/treatment as well.  Also educated patient that she is not to have sex for the next 7 to 10 days until the antibiotics have finished.}    No alarm signs or symptoms present   Differential Diagnoses for this patient's chief complaint that I considered include:  Acute cystitis, gonorrhea, chlamydia, HIV, HPV, syphilis, trichomonas, PID, Mycoplasma genitalium, candidiasis,     Patient is} agreeable to treatment plan and state they will follow-up if symptoms do not improve and/or if symptoms worsen   see patient's AVS 'monitor for' section for specific patient instructions given and discussed regarding what to watch for and when to follow up    MERISSA Cartagena [facility]      ______________________________________________________________________      Subjective     HPI: Lauren Santiago  is a 34 year old  female who presents today for evaluation the following concerns:   Patient presents today endorsing she wants STD screening, she denies any known exposure to any STDs,   Patient states none of  her partners have told her that they are positive for either gonorrhea or chlamydia.    Patient denies any pelvic or abdominal pain.  Patient denies any flank pain. Patient denies any vaginal discharge, or abnormal bleeding.   Patient denies concerns about HIV or syphilis today   Patient does have some burning with urination     Review of Systems:  Pertinent review of systems as reflected in HPI, otherwise negative.     Objective    Physical Exam:  Vitals:    08/21/24 1515   BP: 107/68   BP Location: Right arm   Patient Position: Sitting   Cuff Size: Adult Regular   Pulse: 81   Resp: 18   Temp: 98.1  F (36.7  C)   TempSrc: Oral   SpO2: 97%      General: Alert and oriented, no acute distress, afebrile, normotensive  Psy/mental status: Nonanxious, cooperative  SKIN: Intact, no open areas  ABDOMEN:  soft, non-tender, non-distended    No flank pain or CVA tenderness   Pelvic/ :   Deferred       LABS:   Results for orders placed or performed in visit on 08/21/24   UA Macroscopic with reflex to Microscopic and Culture - Clinic Collect     Status: Abnormal    Specimen: Urine, Midstream   Result Value Ref Range    Color Urine Yellow Colorless, Straw, Light Yellow, Yellow    Appearance Urine Clear Clear    Glucose Urine Negative Negative mg/dL    Bilirubin Urine Negative Negative    Ketones Urine Trace (A) Negative mg/dL    Specific Gravity Urine 1.025 1.005 - 1.030    Blood Urine Negative Negative    pH Urine 6.0 5.0 - 8.0    Protein Albumin Urine Negative Negative mg/dL    Urobilinogen Urine 0.2 0.2, 1.0 E.U./dL    Nitrite Urine Negative Negative    Leukocyte Esterase Urine Negative Negative    Narrative    Microscopic not indicated        ______________________________________________________________________    I explained my diagnostic considerations and recommendations to the patient, who voiced understanding and agreement with the treatment plan.   All questions were answered.   We discussed potential side effects,  risks and benefits of any prescribed or recommended therapies, as well as expectations for response to treatments.  Please see AVS for any patient instructions & handouts given.   Patient was advised to contact the Nurse Care Line, their Primary Care provider, Urgent Care, or the Emergency Department if there are new or worsening symptoms, or call 911 for emergencies.

## 2024-08-22 ENCOUNTER — TELEPHONE (OUTPATIENT)
Dept: URGENT CARE | Facility: URGENT CARE | Age: 34
End: 2024-08-22

## 2024-08-22 DIAGNOSIS — A54.9 GONORRHEA: Primary | ICD-10-CM

## 2024-08-22 LAB
C TRACH DNA SPEC QL PROBE+SIG AMP: NEGATIVE
N GONORRHOEA DNA SPEC QL NAA+PROBE: POSITIVE

## 2024-08-22 PROCEDURE — 99207 PR NO CHARGE LOS: CPT | Performed by: STUDENT IN AN ORGANIZED HEALTH CARE EDUCATION/TRAINING PROGRAM

## 2024-08-22 RX ORDER — CEFTRIAXONE SODIUM 1 G
500 VIAL (EA) INJECTION ONCE
Status: ACTIVE | OUTPATIENT
Start: 2024-08-22

## 2024-08-22 NOTE — TELEPHONE ENCOUNTER
RN left non detailed vm for pt regarding test results.    If pt returns call please advise:      Please call and notify of positive gonorrhea.  Rocephin ordered was placed and she can come into clinic any time for treatment.  Notify recent partners.  No intercourse for 1 week after treatment.    [facility] report sent       Mary COLVIN RN

## 2024-08-23 ENCOUNTER — NURSE TRIAGE (OUTPATIENT)
Dept: NURSING | Facility: CLINIC | Age: 34
End: 2024-08-23
Payer: COMMERCIAL

## 2024-08-23 ENCOUNTER — TELEPHONE (OUTPATIENT)
Dept: FAMILY MEDICINE | Facility: CLINIC | Age: 34
End: 2024-08-23
Payer: COMMERCIAL

## 2024-08-23 NOTE — TELEPHONE ENCOUNTER
Pt states her Gonorrhea test results came back positive. It appears the [facility] already ordered the Rocephin IM, to be administered at the [facility]. Advised pt return to that [facility] and let them know she was already seen and an ABX injection was ordered for her.       No need for triage.        Rosy Patrick, RN on 8/23/2024 at 6:58 PM    Reason for Disposition   Nursing judgment or information in reference     Advised to be seen in [facility] in the next 24 hrs to get the ABX injection.    Protocols used: No Guideline [id number]

## 2024-08-23 NOTE — TELEPHONE ENCOUNTER
New Medication Request    Contacts       Contact Date/Time Type Contact Phone/Fax    08/23/2024 12:34 PM CDT Phone (Incoming) Lauren Santiago (Self) [phone] (M)            What medication are you requesting?: Antibiotics    Reason for medication request: Pt was told that if test results are positive, provider would prescribe antibiotics. Seen in [facility] 8/21/2024    Have you taken this medication before?: Yes: has been prescribed antibiotics for same symptoms before    Controlled Substance Agreement on file:   CSA -- Patient Level:    CSA: None found at the patient level.         Patient offered an appointment? No    Preferred Pharmacy:     [id number] - [place], [place] - [address]  [address]  [facility] [address]  Phone: [phone] Fax: [phone]      Could we send this information to you in [place] or would you prefer to receive a phone call?:   No preference   Okay to leave a detailed message?: Yes at Cell number on file:    Telephone Information:   Mobile [phone]

## 2024-08-23 NOTE — TELEPHONE ENCOUNTER
Reached out to patient and left a message to return phone call. When patient returns phone call please reach out directly to triage nurse so  patient can be informed of her results.     Dayana Li, [facility]

## 2024-08-24 ENCOUNTER — APPOINTMENT (OUTPATIENT)
Dept: RADIOLOGY | Facility: HOSPITAL | Age: 34
DRG: 558 | End: 2024-08-24
Attending: STUDENT IN AN ORGANIZED HEALTH CARE EDUCATION/TRAINING PROGRAM
Payer: COMMERCIAL

## 2024-08-24 ENCOUNTER — APPOINTMENT (OUTPATIENT)
Dept: MRI IMAGING | Facility: HOSPITAL | Age: 34
DRG: 558 | End: 2024-08-24
Attending: EMERGENCY MEDICINE
Payer: COMMERCIAL

## 2024-08-24 ENCOUNTER — HOSPITAL ENCOUNTER (INPATIENT)
Facility: HOSPITAL | Age: 34
LOS: 1 days | Discharge: LEFT BEFORE DISCHARGE | DRG: 558 | End: 2024-08-25
Attending: EMERGENCY MEDICINE | Admitting: INTERNAL MEDICINE
Payer: COMMERCIAL

## 2024-08-24 DIAGNOSIS — M79.622 PAIN OF LEFT UPPER ARM: ICD-10-CM

## 2024-08-24 DIAGNOSIS — L08.9 INFECTION OF LEFT HAND: ICD-10-CM

## 2024-08-24 LAB
ANION GAP SERPL CALCULATED.3IONS-SCNC: 13 MMOL/L (ref 7–15)
BUN SERPL-MCNC: 14.5 MG/DL (ref 6–20)
CALCIUM SERPL-MCNC: 9.3 MG/DL (ref 8.8–10.4)
CHLORIDE SERPL-SCNC: 98 MMOL/L (ref 98–107)
CK SERPL-CCNC: 130 U/L (ref 26–192)
CREAT SERPL-MCNC: 0.68 MG/DL (ref 0.51–0.95)
CRP SERPL-MCNC: 155 MG/L
EGFRCR SERPLBLD CKD-EPI 2021: >90 ML/MIN/1.73M2
ERYTHROCYTE [DISTWIDTH] IN BLOOD BY AUTOMATED COUNT: 13.3 % (ref 10–15)
ERYTHROCYTE [SEDIMENTATION RATE] IN BLOOD BY WESTERGREN METHOD: 25 MM/HR (ref 0–20)
GLUCOSE BLDC GLUCOMTR-MCNC: 119 MG/DL (ref 70–99)
GLUCOSE SERPL-MCNC: 136 MG/DL (ref 70–99)
HBV SURFACE AG SERPL QL IA: NONREACTIVE
HCG INTACT+B SERPL-ACNC: <1 MIU/ML
HCO3 SERPL-SCNC: 26 MMOL/L (ref 22–29)
HCT VFR BLD AUTO: 36.1 % (ref 35–47)
HCV AB SERPL QL IA: NONREACTIVE
HGB BLD-MCNC: 12.3 G/DL (ref 11.7–15.7)
MAGNESIUM SERPL-MCNC: 1.9 MG/DL (ref 1.7–2.3)
MCH RBC QN AUTO: 29.1 PG (ref 26.5–33)
MCHC RBC AUTO-ENTMCNC: 34.1 G/DL (ref 31.5–36.5)
MCV RBC AUTO: 86 FL (ref 78–100)
PLATELET # BLD AUTO: 191 10E3/UL (ref 150–450)
POTASSIUM SERPL-SCNC: 4.1 MMOL/L (ref 3.4–5.3)
RBC # BLD AUTO: 4.22 10E6/UL (ref 3.8–5.2)
SARS-COV-2 RNA RESP QL NAA+PROBE: NEGATIVE
SODIUM SERPL-SCNC: 137 MMOL/L (ref 135–145)
WBC # BLD AUTO: 15 10E3/UL (ref 4–11)

## 2024-08-24 PROCEDURE — 87635 SARS-COV-2 COVID-19 AMP PRB: CPT | Performed by: INTERNAL MEDICINE

## 2024-08-24 PROCEDURE — 87340 HEPATITIS B SURFACE AG IA: CPT | Performed by: INTERNAL MEDICINE

## 2024-08-24 PROCEDURE — 250N000012 HC RX MED GY IP 250 OP 636 PS 637: Performed by: INTERNAL MEDICINE

## 2024-08-24 PROCEDURE — 96375 TX/PRO/DX INJ NEW DRUG ADDON: CPT

## 2024-08-24 PROCEDURE — 250N000009 HC RX 250: Mod: JW | Performed by: EMERGENCY MEDICINE

## 2024-08-24 PROCEDURE — 82550 ASSAY OF CK (CPK): CPT | Performed by: EMERGENCY MEDICINE

## 2024-08-24 PROCEDURE — 85652 RBC SED RATE AUTOMATED: CPT | Performed by: EMERGENCY MEDICINE

## 2024-08-24 PROCEDURE — 73218 MRI UPPER EXTREMITY W/O DYE: CPT | Mod: LT

## 2024-08-24 PROCEDURE — 73130 X-RAY EXAM OF HAND: CPT | Mod: LT

## 2024-08-24 PROCEDURE — 83735 ASSAY OF MAGNESIUM: CPT | Performed by: INTERNAL MEDICINE

## 2024-08-24 PROCEDURE — 250N000011 HC RX IP 250 OP 636: Performed by: INTERNAL MEDICINE

## 2024-08-24 PROCEDURE — 96372 THER/PROPH/DIAG INJ SC/IM: CPT | Performed by: EMERGENCY MEDICINE

## 2024-08-24 PROCEDURE — G0378 HOSPITAL OBSERVATION PER HR: HCPCS

## 2024-08-24 PROCEDURE — 84702 CHORIONIC GONADOTROPIN TEST: CPT | Performed by: EMERGENCY MEDICINE

## 2024-08-24 PROCEDURE — 99222 1ST HOSP IP/OBS MODERATE 55: CPT | Performed by: INTERNAL MEDICINE

## 2024-08-24 PROCEDURE — 96376 TX/PRO/DX INJ SAME DRUG ADON: CPT

## 2024-08-24 PROCEDURE — 72156 MRI NECK SPINE W/O & W/DYE: CPT

## 2024-08-24 PROCEDURE — 96374 THER/PROPH/DIAG INJ IV PUSH: CPT

## 2024-08-24 PROCEDURE — 250N000013 HC RX MED GY IP 250 OP 250 PS 637: Performed by: INTERNAL MEDICINE

## 2024-08-24 PROCEDURE — 80048 BASIC METABOLIC PNL TOTAL CA: CPT | Performed by: EMERGENCY MEDICINE

## 2024-08-24 PROCEDURE — 96365 THER/PROPH/DIAG IV INF INIT: CPT

## 2024-08-24 PROCEDURE — 250N000011 HC RX IP 250 OP 636: Performed by: EMERGENCY MEDICINE

## 2024-08-24 PROCEDURE — 36415 COLL VENOUS BLD VENIPUNCTURE: CPT | Performed by: EMERGENCY MEDICINE

## 2024-08-24 PROCEDURE — 255N000002 HC RX 255 OP 636: Performed by: EMERGENCY MEDICINE

## 2024-08-24 PROCEDURE — A9585 GADOBUTROL INJECTION: HCPCS | Performed by: EMERGENCY MEDICINE

## 2024-08-24 PROCEDURE — 73030 X-RAY EXAM OF SHOULDER: CPT | Mod: LT

## 2024-08-24 PROCEDURE — 86803 HEPATITIS C AB TEST: CPT | Performed by: INTERNAL MEDICINE

## 2024-08-24 PROCEDURE — 99285 EMERGENCY DEPT VISIT HI MDM: CPT | Mod: 25

## 2024-08-24 PROCEDURE — 82962 GLUCOSE BLOOD TEST: CPT

## 2024-08-24 PROCEDURE — 85027 COMPLETE CBC AUTOMATED: CPT | Performed by: EMERGENCY MEDICINE

## 2024-08-24 PROCEDURE — 86140 C-REACTIVE PROTEIN: CPT | Performed by: EMERGENCY MEDICINE

## 2024-08-24 RX ORDER — HYDROMORPHONE HYDROCHLORIDE 4 MG/1
4 TABLET ORAL EVERY 4 HOURS PRN
Status: DISCONTINUED | OUTPATIENT
Start: 2024-08-24 | End: 2024-08-24

## 2024-08-24 RX ORDER — NALOXONE HYDROCHLORIDE 0.4 MG/ML
0.2 INJECTION, SOLUTION INTRAMUSCULAR; INTRAVENOUS; SUBCUTANEOUS
Status: DISCONTINUED | OUTPATIENT
Start: 2024-08-24 | End: 2024-08-25 | Stop reason: HOSPADM

## 2024-08-24 RX ORDER — ONDANSETRON 2 MG/ML
4 INJECTION INTRAMUSCULAR; INTRAVENOUS EVERY 6 HOURS PRN
Status: DISCONTINUED | OUTPATIENT
Start: 2024-08-24 | End: 2024-08-25 | Stop reason: HOSPADM

## 2024-08-24 RX ORDER — BENZTROPINE MESYLATE 0.5 MG/1
0.5 TABLET ORAL DAILY
Status: DISCONTINUED | OUTPATIENT
Start: 2024-08-25 | End: 2024-08-25 | Stop reason: HOSPADM

## 2024-08-24 RX ORDER — LORAZEPAM 0.5 MG/1
0.5 TABLET ORAL EVERY 4 HOURS PRN
Status: DISCONTINUED | OUTPATIENT
Start: 2024-08-24 | End: 2024-08-25 | Stop reason: HOSPADM

## 2024-08-24 RX ORDER — TRAZODONE HYDROCHLORIDE 50 MG/1
50 TABLET, FILM COATED ORAL AT BEDTIME
Status: DISCONTINUED | OUTPATIENT
Start: 2024-08-24 | End: 2024-08-25 | Stop reason: HOSPADM

## 2024-08-24 RX ORDER — DEXTROSE MONOHYDRATE 25 G/50ML
25-50 INJECTION, SOLUTION INTRAVENOUS
Status: DISCONTINUED | OUTPATIENT
Start: 2024-08-24 | End: 2024-08-25 | Stop reason: HOSPADM

## 2024-08-24 RX ORDER — PIPERACILLIN SODIUM, TAZOBACTAM SODIUM 3; .375 G/15ML; G/15ML
3.38 INJECTION, POWDER, LYOPHILIZED, FOR SOLUTION INTRAVENOUS ONCE
Status: COMPLETED | OUTPATIENT
Start: 2024-08-24 | End: 2024-08-24

## 2024-08-24 RX ORDER — HYDROMORPHONE HYDROCHLORIDE 1 MG/ML
0.5 INJECTION, SOLUTION INTRAMUSCULAR; INTRAVENOUS; SUBCUTANEOUS EVERY 4 HOURS PRN
Status: DISCONTINUED | OUTPATIENT
Start: 2024-08-24 | End: 2024-08-25 | Stop reason: HOSPADM

## 2024-08-24 RX ORDER — CALCIUM CARBONATE 500 MG/1
1000 TABLET, CHEWABLE ORAL 4 TIMES DAILY PRN
Status: DISCONTINUED | OUTPATIENT
Start: 2024-08-24 | End: 2024-08-25 | Stop reason: HOSPADM

## 2024-08-24 RX ORDER — NALOXONE HYDROCHLORIDE 0.4 MG/ML
0.4 INJECTION, SOLUTION INTRAMUSCULAR; INTRAVENOUS; SUBCUTANEOUS
Status: DISCONTINUED | OUTPATIENT
Start: 2024-08-24 | End: 2024-08-25 | Stop reason: HOSPADM

## 2024-08-24 RX ORDER — ONDANSETRON 4 MG/1
4 TABLET, ORALLY DISINTEGRATING ORAL EVERY 6 HOURS PRN
Status: DISCONTINUED | OUTPATIENT
Start: 2024-08-24 | End: 2024-08-25 | Stop reason: HOSPADM

## 2024-08-24 RX ORDER — AMOXICILLIN 250 MG
2 CAPSULE ORAL 2 TIMES DAILY PRN
Status: DISCONTINUED | OUTPATIENT
Start: 2024-08-24 | End: 2024-08-25 | Stop reason: HOSPADM

## 2024-08-24 RX ORDER — OLANZAPINE 5 MG/1
10 TABLET ORAL PRN
COMMUNITY

## 2024-08-24 RX ORDER — TRAZODONE HYDROCHLORIDE 50 MG/1
50 TABLET, FILM COATED ORAL AT BEDTIME
COMMUNITY

## 2024-08-24 RX ORDER — HYDROMORPHONE HYDROCHLORIDE 2 MG/1
2 TABLET ORAL EVERY 4 HOURS PRN
Status: DISCONTINUED | OUTPATIENT
Start: 2024-08-24 | End: 2024-08-24

## 2024-08-24 RX ORDER — HYDROXYZINE HYDROCHLORIDE 50 MG/1
50 TABLET, FILM COATED ORAL 3 TIMES DAILY PRN
Status: DISCONTINUED | OUTPATIENT
Start: 2024-08-24 | End: 2024-08-25 | Stop reason: HOSPADM

## 2024-08-24 RX ORDER — BUSPIRONE HYDROCHLORIDE 10 MG/1
10 TABLET ORAL 3 TIMES DAILY
Status: DISCONTINUED | OUTPATIENT
Start: 2024-08-24 | End: 2024-08-25 | Stop reason: HOSPADM

## 2024-08-24 RX ORDER — OLANZAPINE 5 MG/1
10 TABLET ORAL DAILY PRN
Status: DISCONTINUED | OUTPATIENT
Start: 2024-08-24 | End: 2024-08-25 | Stop reason: HOSPADM

## 2024-08-24 RX ORDER — POLYETHYLENE GLYCOL 3350 17 G/17G
17 POWDER, FOR SOLUTION ORAL 2 TIMES DAILY PRN
Status: DISCONTINUED | OUTPATIENT
Start: 2024-08-24 | End: 2024-08-25 | Stop reason: HOSPADM

## 2024-08-24 RX ORDER — METFORMIN HCL 500 MG
500 TABLET, EXTENDED RELEASE 24 HR ORAL
COMMUNITY

## 2024-08-24 RX ORDER — LIDOCAINE 40 MG/G
CREAM TOPICAL
Status: DISCONTINUED | OUTPATIENT
Start: 2024-08-24 | End: 2024-08-25 | Stop reason: HOSPADM

## 2024-08-24 RX ORDER — HYDROXYZINE HYDROCHLORIDE 25 MG/1
25-75 TABLET, FILM COATED ORAL 3 TIMES DAILY PRN
COMMUNITY

## 2024-08-24 RX ORDER — ACETAMINOPHEN 325 MG/1
325-650 TABLET ORAL EVERY 6 HOURS PRN
COMMUNITY

## 2024-08-24 RX ORDER — GUAIFENESIN/DEXTROMETHORPHAN 100-10MG/5
10 SYRUP ORAL EVERY 4 HOURS PRN
Status: DISCONTINUED | OUTPATIENT
Start: 2024-08-24 | End: 2024-08-25 | Stop reason: HOSPADM

## 2024-08-24 RX ORDER — BENZTROPINE MESYLATE 0.5 MG/1
0.5 TABLET ORAL DAILY
COMMUNITY

## 2024-08-24 RX ORDER — LANOLIN ALCOHOL/MO/W.PET/CERES
6 CREAM (GRAM) TOPICAL
COMMUNITY

## 2024-08-24 RX ORDER — KETOROLAC TROMETHAMINE 15 MG/ML
15 INJECTION, SOLUTION INTRAMUSCULAR; INTRAVENOUS ONCE
Status: COMPLETED | OUTPATIENT
Start: 2024-08-24 | End: 2024-08-24

## 2024-08-24 RX ORDER — ACETAMINOPHEN 650 MG/1
650 SUPPOSITORY RECTAL EVERY 4 HOURS PRN
Status: DISCONTINUED | OUTPATIENT
Start: 2024-08-24 | End: 2024-08-25 | Stop reason: HOSPADM

## 2024-08-24 RX ORDER — AMOXICILLIN 250 MG
1 CAPSULE ORAL 2 TIMES DAILY
Status: DISCONTINUED | OUTPATIENT
Start: 2024-08-24 | End: 2024-08-25 | Stop reason: HOSPADM

## 2024-08-24 RX ORDER — BUPRENORPHINE AND NALOXONE 8; 2 MG/1; MG/1
1 FILM, SOLUBLE BUCCAL; SUBLINGUAL 2 TIMES DAILY
Status: DISCONTINUED | OUTPATIENT
Start: 2024-08-24 | End: 2024-08-25 | Stop reason: HOSPADM

## 2024-08-24 RX ORDER — GADOBUTROL 604.72 MG/ML
7 INJECTION INTRAVENOUS ONCE
Status: COMPLETED | OUTPATIENT
Start: 2024-08-24 | End: 2024-08-24

## 2024-08-24 RX ORDER — GABAPENTIN 800 MG/1
800 TABLET ORAL 3 TIMES DAILY
COMMUNITY

## 2024-08-24 RX ORDER — GABAPENTIN 400 MG/1
800 CAPSULE ORAL 3 TIMES DAILY
Status: DISCONTINUED | OUTPATIENT
Start: 2024-08-24 | End: 2024-08-25 | Stop reason: HOSPADM

## 2024-08-24 RX ORDER — BUSPIRONE HYDROCHLORIDE 10 MG/1
10 TABLET ORAL 3 TIMES DAILY
COMMUNITY

## 2024-08-24 RX ORDER — ACETAMINOPHEN 325 MG/1
650 TABLET ORAL EVERY 4 HOURS PRN
Status: DISCONTINUED | OUTPATIENT
Start: 2024-08-24 | End: 2024-08-25 | Stop reason: HOSPADM

## 2024-08-24 RX ORDER — PIPERACILLIN SODIUM, TAZOBACTAM SODIUM 3; .375 G/15ML; G/15ML
3.38 INJECTION, POWDER, LYOPHILIZED, FOR SOLUTION INTRAVENOUS EVERY 8 HOURS
Status: DISCONTINUED | OUTPATIENT
Start: 2024-08-24 | End: 2024-08-25 | Stop reason: HOSPADM

## 2024-08-24 RX ORDER — LORAZEPAM 2 MG/ML
1 INJECTION INTRAMUSCULAR ONCE
Status: COMPLETED | OUTPATIENT
Start: 2024-08-24 | End: 2024-08-24

## 2024-08-24 RX ORDER — AMOXICILLIN 250 MG
1 CAPSULE ORAL 2 TIMES DAILY PRN
Status: DISCONTINUED | OUTPATIENT
Start: 2024-08-24 | End: 2024-08-25 | Stop reason: HOSPADM

## 2024-08-24 RX ORDER — BUPRENORPHINE AND NALOXONE 8; 2 MG/1; MG/1
1 FILM, SOLUBLE BUCCAL; SUBLINGUAL 2 TIMES DAILY
COMMUNITY
End: 2024-10-01

## 2024-08-24 RX ORDER — NICOTINE POLACRILEX 4 MG
15-30 LOZENGE BUCCAL
Status: DISCONTINUED | OUTPATIENT
Start: 2024-08-24 | End: 2024-08-25 | Stop reason: HOSPADM

## 2024-08-24 RX ORDER — AMOXICILLIN 250 MG
2 CAPSULE ORAL 2 TIMES DAILY
Status: DISCONTINUED | OUTPATIENT
Start: 2024-08-24 | End: 2024-08-25 | Stop reason: HOSPADM

## 2024-08-24 RX ORDER — ENOXAPARIN SODIUM 100 MG/ML
40 INJECTION SUBCUTANEOUS EVERY 24 HOURS
Status: DISCONTINUED | OUTPATIENT
Start: 2024-08-24 | End: 2024-08-25 | Stop reason: HOSPADM

## 2024-08-24 RX ADMIN — LORAZEPAM 0.5 MG: 0.5 TABLET ORAL at 21:04

## 2024-08-24 RX ADMIN — LIDOCAINE HYDROCHLORIDE 500 MG: 10 INJECTION, SOLUTION EPIDURAL; INFILTRATION; INTRACAUDAL; PERINEURAL at 14:05

## 2024-08-24 RX ADMIN — HYDROMORPHONE HYDROCHLORIDE 0.5 MG: 1 INJECTION, SOLUTION INTRAMUSCULAR; INTRAVENOUS; SUBCUTANEOUS at 18:45

## 2024-08-24 RX ADMIN — BUSPIRONE HYDROCHLORIDE 10 MG: 10 TABLET ORAL at 22:17

## 2024-08-24 RX ADMIN — HYDROMORPHONE HYDROCHLORIDE 4 MG: 4 TABLET ORAL at 17:09

## 2024-08-24 RX ADMIN — PIPERACILLIN AND TAZOBACTAM 3.38 G: 3; .375 INJECTION, POWDER, FOR SOLUTION INTRAVENOUS at 13:13

## 2024-08-24 RX ADMIN — PIPERACILLIN AND TAZOBACTAM 3.38 G: 3; .375 INJECTION, POWDER, FOR SOLUTION INTRAVENOUS at 21:03

## 2024-08-24 RX ADMIN — GADOBUTROL 7 ML: 604.72 INJECTION INTRAVENOUS at 11:47

## 2024-08-24 RX ADMIN — KETOROLAC TROMETHAMINE 15 MG: 15 INJECTION, SOLUTION INTRAMUSCULAR; INTRAVENOUS at 10:12

## 2024-08-24 RX ADMIN — GABAPENTIN 800 MG: 400 CAPSULE ORAL at 22:17

## 2024-08-24 RX ADMIN — HYDROMORPHONE HYDROCHLORIDE 1 MG: 1 INJECTION, SOLUTION INTRAMUSCULAR; INTRAVENOUS; SUBCUTANEOUS at 10:18

## 2024-08-24 RX ADMIN — BUPRENORPHINE AND NALOXONE 1 FILM: 8; 2 FILM, SOLUBLE BUCCAL; SUBLINGUAL at 22:17

## 2024-08-24 RX ADMIN — HYDROMORPHONE HYDROCHLORIDE 1 MG: 1 INJECTION, SOLUTION INTRAMUSCULAR; INTRAVENOUS; SUBCUTANEOUS at 13:39

## 2024-08-24 RX ADMIN — LORAZEPAM 1 MG: 2 INJECTION INTRAMUSCULAR; INTRAVENOUS at 10:24

## 2024-08-24 RX ADMIN — TRAZODONE HYDROCHLORIDE 50 MG: 50 TABLET ORAL at 22:17

## 2024-08-24 RX ADMIN — LORAZEPAM 0.5 MG: 0.5 TABLET ORAL at 17:10

## 2024-08-24 ASSESSMENT — ACTIVITIES OF DAILY LIVING (ADL)
ADLS_ACUITY_SCORE: 35
ADLS_ACUITY_SCORE: 19
ADLS_ACUITY_SCORE: 35
ADLS_ACUITY_SCORE: 19
ADLS_ACUITY_SCORE: 35
ADLS_ACUITY_SCORE: 19
ADLS_ACUITY_SCORE: 35
ADLS_ACUITY_SCORE: 19
ADLS_ACUITY_SCORE: 35
ADLS_ACUITY_SCORE: 35

## 2024-08-24 ASSESSMENT — COLUMBIA-SUICIDE SEVERITY RATING SCALE - C-SSRS
1. IN THE PAST MONTH, HAVE YOU WISHED YOU WERE DEAD OR WISHED YOU COULD GO TO SLEEP AND NOT WAKE UP?: NO
2. HAVE YOU ACTUALLY HAD ANY THOUGHTS OF KILLING YOURSELF IN THE PAST MONTH?: NO
6. HAVE YOU EVER DONE ANYTHING, STARTED TO DO ANYTHING, OR PREPARED TO DO ANYTHING TO END YOUR LIFE?: NO

## 2024-08-24 NOTE — ED NOTES
Assisted pt out of her long sleeve shirt.  Declined gown pt has tank top on so provider able to do assessment of arm and shoulder.  Pt also wondering if she can get her abx shot that was orderded at  across the street for std.  Informed pt would ask provider.

## 2024-08-24 NOTE — ED TRIAGE NOTES
Patient reports right arm pain that radiates from the shoulder to the hand. Denies trauma.     Triage Assessment (Adult)       Row Name 08/24/24 0908          Triage Assessment    Airway WDL WDL        Respiratory WDL    Respiratory WDL WDL        Skin Circulation/Temperature WDL    Skin Circulation/Temperature WDL WDL        Cardiac WDL    Cardiac WDL WDL        Peripheral/Neurovascular WDL    Peripheral Neurovascular WDL WDL        Cognitive/Neuro/Behavioral WDL    Cognitive/Neuro/Behavioral WDL WDL

## 2024-08-24 NOTE — PHARMACY-ADMISSION MEDICATION HISTORY
Pharmacy Intern Admission Medication History    Admission medication history is complete. The information provided in this note is only as accurate as the sources available at the time of the update.    Information Source(s): Patient via in-person    Pertinent Information: Patient confirmed they took their morning medications today. They showed me a list of every medication they take, including an escitalopram 10 mg once daily to start taking on 08/28/2024. Ceftriaxone dose was a one time dose and was administered 08/22/2024 at 1400. Unable to delete from med list. Trazodone dose is 50 mg confirmed, but patient may have 150 mg on hand per fill history, but wasn't included on their med list. Last hydroxyzine dose was 25 mg.     Changes made to PTA medication list:  Added: All  Deleted: None  Changed: None    Allergies reviewed with patient and updates made in EHR: yes    Medication History Completed By: ANDREEA PARK 8/24/2024 2:40 PM    Current Facility-Administered Medications for the 8/24/24 encounter (Hospital Encounter)   Medication    cefTRIAXone (ROCEPHIN) in lidocaine 1% (PF) for IM administration only 500 mg     PTA Med List   Medication Sig Last Dose    acetaminophen (TYLENOL) 325 MG tablet Take 325-650 mg by mouth every 6 hours as needed for mild pain. More than a month at prn    benztropine (COGENTIN) 0.5 MG tablet Take 0.5 mg by mouth daily. 8/24/2024 at AM    buprenorphine HCl-naloxone HCl (SUBOXONE) 8-2 MG per film Place 1 Film under the tongue 2 times daily. 8/24/2024 at AM    busPIRone (BUSPAR) 10 MG tablet Take 10 mg by mouth 3 times daily. 8/24/2024 at AM    gabapentin (NEURONTIN) 800 MG tablet Take 800 mg by mouth 3 times daily. 8/24/2024 at AM    hydrOXYzine HCl (ATARAX) 25 MG tablet Take 25-75 mg by mouth 3 times daily as needed for anxiety. 8/23/2024 at PM    melatonin 3 MG tablet Take 6 mg by mouth nightly as needed for sleep. 8/20/2024 at hs    metFORMIN (GLUCOPHAGE XR) 500 MG 24 hr tablet  Take 500 mg by mouth daily (with dinner). 8/24/2024 at AM    naloxone (NARCAN) 4 MG/0.1ML nasal spray Spray 4 mg into one nostril alternating nostrils as needed for opioid reversal. every 2-3 minutes until assistance arrives Unknown at prn    OLANZapine (ZYPREXA) 5 MG tablet Take 10 mg by mouth as needed (anxiety). 8/23/2024 at PM    paliperidone (INVEGA SUSTENNA) 156 MG/ML AUDREY injection Inject 156 mg into the muscle every 28 days. 8/7/2024 at AM    traZODone (DESYREL) 50 MG tablet Take 50 mg by mouth at bedtime. 8/23/2024 at hs

## 2024-08-24 NOTE — ED NOTES
Returned call from [facility] and updated on pt status.  Staff would like an update if anything changes.

## 2024-08-24 NOTE — ED PROVIDER NOTES
EMERGENCY DEPARTMENT ENCOUNTER      NAME: Lauren Santiago  AGE: 34 year old female  YOB: 1990  MRN: [id number]  EVALUATION DATE & TIME: 8/24/2024  9:16 AM    PCP: Amee Campa    ED PROVIDER: Adair Griffith M.D.      Chief Complaint   Patient presents with    Arm Pain         FINAL IMPRESSION:  Left hand inflammation/infection  Left arm pain    ED COURSE & MEDICAL DECISION MAKING:    Pertinent Labs & Imaging studies reviewed. (See chart for details)  34 year old female presents to the Emergency Department for evaluation of severe left arm pain.  Describes it as marked achiness starts in the posterior shoulder and radiates down into the hand.  Much worse with movement of the forearm in particular the wrist and long finger.  Denies any injuries traumas.  No prior similar episodes.  Patient with a history of substance abuse and on chronic Suboxone therapy.  This is unchanged.  Patient reports last intravenous drug abuse approximately 6 months ago.  Examination reveals a well-nourished follow-up female in marked distress, tearful.  Vital signs unremarkable.  Neck is supple with mild diffuse tenderness.  Patient with marked tenderness posterior left shoulder.  No rashes.  Slight paresthesias to the entire left arm when compared to the right.  No warmth nor erythema.  No soft tissue swelling.  No rashes.  Primary concern is of radicular symptomatology/impingement.  She does have a distant history of IV drug abuse placing her at slight increased risk of discitis.  IV access being established.  Blood work being obtained including inflammatory markers.  Will obtain MRI of the arm.  Will proceed with intravenous Toradol, Ativan and Dilaudid for symptomatic relief. Patient appears non toxic with stable vitals signs.    09:20 AM I met with the patient for the initial interview. I was unable to perform the physical exam due to the patient being unable to remove long-sleeved shirt over left arm. Discussed plan for  treatment and workup in the ED.  09:40 AM I performed the physical examination after a nurse assisted with removal of the patient's long-sleeve shirt.  10:53 AM.  Protein markedly elevated 155.  Sed rate elevated 25.  White cell count elevated 15.  Patient metabolic panel unremarkable.  12:49 PM I checked on the patient.  MRI without evidence of significant impingement or other cervical processes would explain her symptomatology.  On reexamination patient feels improved.  I can internally and externally rotate her shoulder with minimal symptoms.  However with elevation patient indicates marked discomfort along the proximal medial arm.  Patient now with more appreciable soft tissue swelling to the long finger and index finger of the left hand with exquisite tenderness along the proximal long finger and distal third metacarpal area.  This is raising concerns of potential distal infectious/inflammatory process radiating more proximally.  Patient initiated on Zosyn.  Call placed to [facility]   Also received Rocephin 500 mg IM due to ongoing treatment of STD  1:22 PM I spoke with Dr. Huynh from [facility].  He recommends obtaining MRI of the hand.  He will be able to see patient in the emergency room.  Call placed to the admitting physician 1:55 PM.  Patient discussed with the hospitalist   2:40 PM.  Patient discussed with Dr. Kang of orthopedics.  Patient admitted to him that she had relapsed on fentanyl/methamphetamine and had laid on her arm much in the night after drug use.  This is likely the explanation for her symptomatology given compression symptoms.  He does request that an MRI be done of the shoulder over concerns of effusion given her reports of STD.  At the conclusion of the encounter I discussed the results of all of the tests and the disposition. The questions were answered and return precautions provided. The patient or family acknowledged understanding and was agreeable with  the care plan.     No MIPS measures identified.  Patient represents a critical care situation.  Approximately 45 minutes spent directly involved in patient's care independent of any procedures.      MEDICATIONS GIVEN IN THE EMERGENCY:  Medications   piperacillin-tazobactam (ZOSYN) 3.375 g vial to attach to  mL bag (3.375 g Intravenous $New Bag 8/24/24 1313)   cefTRIAXone (ROCEPHIN) 500 mg in lidocaine injection (has no administration in time range)   HYDROmorphone (DILAUDID) injection 1 mg (has no administration in time range)   LORazepam (ATIVAN) injection 1 mg (1 mg Intravenous $Given 8/24/24 1024)   ketorolac (TORADOL) injection 15 mg (15 mg Intravenous $Given 8/24/24 1012)   HYDROmorphone (DILAUDID) injection 1 mg (1 mg Intravenous $Given 8/24/24 1018)   gadobutrol (GADAVIST) injection 7 mL (7 mLs Intravenous $Given 8/24/24 1147)       NEW PRESCRIPTIONS STARTED AT TODAY'S ER VISIT  New Prescriptions    No medications on file          =================================================================    HPI    Patient information was obtained from: Patient    Use of Intrepreter: N/A       Lauren Santiago is a 34 year old female with a pertient medical history of Mental health and Polysubstance dependence who presents to the ED for evaluation of left arm pain.    Patient states that yesterday they awoke with pain in their left should blade down their left arm and fingers. Today, they awoke and was unable to move their arm, wrist, or fingers without pain.They state that they cannot flex or extend their 2nd and 3rd digits of the left hand and doing so results in screaming pain and that the digits are also swollen. They state that there is no redness of the arm. They report prior IV drug use, but not for about 6 months.      REVIEW OF SYSTEMS   Constitutional:  Denies fever, chills  Respiratory:  Denies productive cough or increased work of breathing  Cardiovascular:  Denies chest pain, palpitations  GI:  Denies  abdominal pain, nausea, vomiting, or change in bowel or bladder habits   Musculoskeletal:  Endorses swelling of the 2nd and 3rd digits of the left hand.  Skin:  Denies rash   Neurologic:  Denies focal weakness  All systems negative except as marked.     PAST MEDICAL HISTORY:  Past Medical History:   Diagnosis Date    Anxiety 6/16/2015    GERD (gastroesophageal reflux disease) 5/23/2015    Headache     Migraine headache 6/23/2015    Mood disorder (H24) 6/16/2015    Opiate abuse, episodic (H)     Stopped and detoxed.    Panic disorder 6/23/2015    PTSD (post-traumatic stress disorder) 2009       PAST SURGICAL HISTORY:  No past surgical history on file.      CURRENT MEDICATIONS:      Current Facility-Administered Medications:     cefTRIAXone (ROCEPHIN) 500 mg in lidocaine injection, 500 mg, Intramuscular, Once, Adair Griffith MD    cefTRIAXone (ROCEPHIN) in lidocaine 1% (PF) for IM administration only 500 mg, 500 mg, Intramuscular, Once,     HYDROmorphone (DILAUDID) injection 1 mg, 1 mg, Intravenous, Once, Adair Griffith MD    piperacillin-tazobactam (ZOSYN) 3.375 g vial to attach to  mL bag, 3.375 g, Intravenous, Once, Adair rGiffith MD, Last Rate: 200 mL/hr at 08/24/24 1313, 3.375 g at 08/24/24 1313    Current Outpatient Medications:     buprenorphine HCl-naloxone HCl (SUBOXONE) 8-2 MG per film, Place 1 Film under the tongue daily, Disp: , Rfl:     Prenatal Vit-Fe Fumarate-FA (PRENATAL MULTIVITAMIN W/IRON) 27-0.8 MG tablet, Take 1 tablet by mouth daily (Patient not taking: Reported on 8/21/2024), Disp: 90 tablet, Rfl: 3    vitamin D3 (CHOLECALCIFEROL) 50 mcg (2000 units) tablet, Take 1 tablet (50 mcg) by mouth daily (Patient not taking: Reported on 8/21/2024), Disp: 90 tablet, Rfl: 1    ALLERGIES:  No Known Allergies    FAMILY HISTORY:  Family History   Problem Relation Age of Onset    Ovarian Cancer Mother         In remission as of 2017    Substance Abuse Paternal Grandfather     Schizophrenia Brother      Substance Abuse Brother     Depression Brother     Mental Illness Brother     Depression Other        SOCIAL HISTORY:   Social History     Socioeconomic History    Marital status: Legally    Tobacco Use    Smoking status: Every Day     Current packs/day: 0.00     Average packs/day: 0.5 packs/day for 5.0 years (2.5 ttl pk-yrs)     Types: Cigarettes     Start date: 3/14/2023     Last attempt to quit: 2012     Years since quittin.6    Smokeless tobacco: Current    Tobacco comments:     e-cig   Substance and Sexual Activity    Alcohol use: No    Drug use: Not Currently     Types: Benzodiazepines     Comment: Drug use: In treatment currently (2017); wants help geting off of Suboxone and Valium;     Sexual activity: Not Currently     Partners: Male   Social History Narrative    Lives with family.      Social Determinants of Health     Financial Resource Strain: High Risk (2024)    Received from [facility]    Overall Financial Resource Strain (CARDIA)     Difficulty of Paying Living Expenses: Hard   Food Insecurity: Food Insecurity Present (2024)    Received from [facility]    Hunger Vital Sign     Worried About Running Out of Food in the Last Year: Often true     Ran Out of Food in the Last Year: Often true   Transportation Needs: Unmet Transportation Needs (2024)    Received from [facility]    PRAPARE - Transportation     Lack of Transportation (Medical): Yes     Lack of Transportation (Non-Medical): Yes   Social Connections: Socially Integrated (3/31/2024)    Received from [facility]    Social Connections     Frequency of Communication with Friends and Family: 0   Interpersonal Safety: Not At Risk (2024)    Received from [facility]    Humiliation, Afraid, Rape, and Kick questionnaire     Fear of Current or Ex-Partner: No     Emotionally Abused: No     Physically  "Abused: No     Sexually Abused: No   Housing Stability: High Risk (5/18/2024)    Received from [facility]    Housing Stability     What is your housing situation today?: 1 - I do not have housing (I am staying with others, in a hotel, in a shelter, living outside on ...       VITALS:  Patient Vitals for the past 24 hrs:   BP Temp Temp src Pulse Resp SpO2 Height Weight   08/24/24 1033 107/64 -- -- 79 -- 97 % -- --   08/24/24 0906 129/75 98.7  F (37.1  C) Temporal 100 18 -- 1.702 m (5' 7\") 74 kg (163 lb 2.3 oz)        PHYSICAL EXAM    Constitutional:  Awake, alert, in marked distress  HENT:  Normocephalic, Atraumatic. Bilateral external ears normal. Oropharynx moist. Nose normal. Neck- Normal range of motion with no guarding, No midline cervical tenderness, Supple, No stridor.   Eyes:  PERRL, EOMI with no signs of entrapment, Conjunctiva normal, No discharge.   Respiratory:  Normal breath sounds, No respiratory distress, No wheezing.    Cardiovascular:  Normal heart rate, Normal rhythm, No appreciable rubs or gallops.   GI:  Soft, No tenderness, No distension, No palpable masses  Musculoskeletal:  Intact distal pulses, No edema. Limited range of motion in the L shoulder and arm. Slight change of sensation diffusely unilaterally in the L arm. No erythema or track marks of any kind in the L arm. Marked tenderness to the dorsum of the hand, the long finger in particular resists range of motion. Marked tenderness in the superior L scapular area.  Integument:  Warm, Dry, No erythema, No rash.   Neurologic:  Alert & oriented, Normal motor function, Normal sensory function, No focal deficits noted.   Psychiatric:  Affect normal, Judgment normal, Mood normal.     LAB:  All pertinent labs reviewed and interpreted.  Results for orders placed or performed during the hospital encounter of 08/24/24   MR Cervical Spine w/o & w Contrast    Impression    IMPRESSION:  1.  Multilevel cervical spondylosis.  2.  No high-grade " spinal canal stenosis. Moderate neural foraminal stenosis on the right at C5-C6.     CBC (+ platelets, no diff)   Result Value Ref Range    WBC Count 15.0 (H) 4.0 - 11.0 10e3/uL    RBC Count 4.22 3.80 - 5.20 10e6/uL    Hemoglobin 12.3 11.7 - 15.7 g/dL    Hematocrit 36.1 35.0 - 47.0 %    MCV 86 78 - 100 fL    MCH 29.1 26.5 - 33.0 pg    MCHC 34.1 31.5 - 36.5 g/dL    RDW 13.3 10.0 - 15.0 %    Platelet Count 191 150 - 450 10e3/uL   Result Value Ref Range    CRP Inflammation 155.00 (H) <5.00 mg/L   Erythrocyte sedimentation rate auto   Result Value Ref Range    Erythrocyte Sedimentation Rate 25 (H) 0 - 20 mm/hr   Basic metabolic panel   Result Value Ref Range    Sodium 137 135 - 145 mmol/L    Potassium 4.1 3.4 - 5.3 mmol/L    Chloride 98 98 - 107 mmol/L    Carbon Dioxide (CO2) 26 22 - 29 mmol/L    Anion Gap 13 7 - 15 mmol/L    Urea Nitrogen 14.5 6.0 - 20.0 mg/dL    Creatinine 0.68 0.51 - 0.95 mg/dL    GFR Estimate >90 >60 mL/min/1.73m2    Calcium 9.3 8.8 - 10.4 mg/dL    Glucose 136 (H) 70 - 99 mg/dL   HCG quantitative pregnancy (blood)   Result Value Ref Range    hCG Quantitative <1 <5 mIU/mL       RADIOLOGY:  Reviewed all pertinent imaging. Please see official radiology report.  MR Cervical Spine w/o & w Contrast   Final Result   IMPRESSION:   1.  Multilevel cervical spondylosis.   2.  No high-grade spinal canal stenosis. Moderate neural foraminal stenosis on the right at C5-C6.                   I, Jos Mckeon, am serving as a scribe to document services personally performed by Adair Griffith MD, based on my observation and the provider's statements to me. I, Adair Griffith MD attest that Jos Mckeon is acting in a scribe capacity, has observed my performance of the services and has documented them in accordance with my direction.    Adair Griffith M.D.  Emergency Medicine  [facility] EMERGENCY DEPARTMENT     Adair Griffith MD  08/24/24 1400       Adair Griffith MD  08/24/24  [id number]

## 2024-08-24 NOTE — ED NOTES
"[facility] ED Handoff Report    ED Chief Complaint: arm pain    ED Diagnosis:  (L08.9) Infection of left hand  Comment:   Plan:     (M79.622) Pain of left upper arm  Comment:   Plan:        PMH:    Past Medical History:   Diagnosis Date    Anxiety 6/16/2015    GERD (gastroesophageal reflux disease) 5/23/2015    Headache     Migraine headache 6/23/2015    Mood disorder (H24) 6/16/2015    Opiate abuse, episodic (H)     Stopped and detoxed.    Panic disorder 6/23/2015    PTSD (post-traumatic stress disorder) 2009        Code Status:  No Order     Falls Risk: No Band: Not applicable    Current Living Situation/Residence: lives in a group home     Elimination Status: Continent: Yes     Activity Level: Independent    Patients Preferred Language:  English     Needed: No    Vital Signs:  /62   Pulse 86   Temp 98.7  F (37.1  C) (Temporal)   Resp 18   Ht 1.702 m (5' 7\")   Wt 74 kg (163 lb 2.3 oz)   LMP 08/04/2023   SpO2 95%   Breastfeeding Unknown   BMI 25.55 kg/m       Cardiac Rhythm: N/A    Pain Score: 9/10    Is the Patient Confused:  No    Last Food or Drink: 8/23/24 at     Focused Assessment:  Pt coming in with c/o left arm pain per pt she fell asleep on arm causing some pain.  Pt states she thinks she slep wrong on it.  Pt unable to move left arm without having increased pain and very painful to touch to arm and hand.  2nd & 3rd digits swoleen and pt unable to straighten index finger out.+radial pulse.    Tests Performed: Done: Labs and Imaging    Treatments Provided:  Abx , Dilaudid, Toradol and Ativan. MRI     Family Dynamics/Concerns: No    Family Updated On Visitor Policy: No    Plan of Care Communicated to Family: No    Who Was Updated about Plan of Care: na    Belongings Checklist Done and Signed by Patient: Yes    Belongings Sent with Patient: back pack, cell phones bead bracelets clothing.cigarette lighter    Medications sent with patient: none        Additional Information:Pt " meth user last used last evening per her friend.      RN: Sonia Balbuena RN   8/24/2024 2:50 PM

## 2024-08-24 NOTE — CONSULTS
ORTHOPAEDIC SURGERY CONSULT    DATE OF CONSULT: 8/24/2024 2:59 PM    REQUESTING PROVIDER: Adair Griffith MD, MD    CC: Left upper extremity pain    DATE OF INJURY: 8/23/24    HISTORY OF PRESENT ILLNESS:   The orthopaedic surgery service was consulted by Adair Torres MD for evaluation and treatment recommendations of the above.    Lauren Santiago is a 34 year old female with history of schizophrenia, polysubstance abuse with recent relapse and recent gonorrhea diagnosis who presents for evaluation of left upper extremity pain including predominantly about the left shoulder and left hand.  She localizes most of the hand pain to the middle finger.  She does have a prior history of injury to the ring and small fingers that have resulted in flexion contracture at the ring and small finger MP joints.  The patient lives in a group home close by.  She states that she relapsed on Friday.  She was smoking meth and she thinks it was laced with fentanyl.  She fell asleep at a random person's house and was asleep for an unknown period of time.  She woke up on Saturday with severe pain in her left upper extremity.  She describes the pain is extending from her shoulder all the way down to her hand.  She describes this as a sharp shooting pain.  She has limited movement to her left shoulder and her left hand.  She denies any fevers or chills.  She denies any significant malaise.  She is fairly weak.    Pain:  Onset: Acute  Location: Left upper extremity including predominantly the left shoulder and hand  Severity: Severe  Quality: Sharp  Alleviating: Rest  Exacerbating: Movement, palpation      PAST MEDICAL HISTORY:  Polysubstance use including meth and fentanyl  Gonorrhea infection  Schizophrenia  Past Medical History:   Diagnosis Date    Anxiety 6/16/2015    GERD (gastroesophageal reflux disease) 5/23/2015    Headache     Migraine headache 6/23/2015    Mood disorder (H24) 6/16/2015    Opiate abuse, episodic (H)      Stopped and detoxed.    Panic disorder 6/23/2015    PTSD (post-traumatic stress disorder) 2009     [Patient denies any personal history of bleeding disorders, clotting disorders, or adverse reactions to anesthesia].    PAST SURGICAL HISTORY:    No past surgical history on file.    MEDICATIONS:   Anticoagulants: None    Prior to Admission medications    Medication Sig Last Dose Taking? Auth Provider Long Term End Date   acetaminophen (TYLENOL) 325 MG tablet Take 325-650 mg by mouth every 6 hours as needed for mild pain. More than a month at prn Yes Unknown, Entered By History     benztropine (COGENTIN) 0.5 MG tablet Take 0.5 mg by mouth daily. 8/24/2024 at AM Yes Unknown, Entered By History Yes    buprenorphine HCl-naloxone HCl (SUBOXONE) 8-2 MG per film Place 1 Film under the tongue 2 times daily. 8/24/2024 at AM Yes Unknown, Entered By History     busPIRone (BUSPAR) 10 MG tablet Take 10 mg by mouth 3 times daily. 8/24/2024 at AM Yes Unknown, Entered By History No    gabapentin (NEURONTIN) 800 MG tablet Take 800 mg by mouth 3 times daily. 8/24/2024 at AM Yes Unknown, Entered By History No    hydrOXYzine HCl (ATARAX) 25 MG tablet Take 25 mg by mouth 3 times daily as needed for anxiety. 8/23/2024 at PM Yes Unknown, Entered By History     melatonin 3 MG tablet Take 6 mg by mouth nightly as needed for sleep. 8/20/2024 at hs Yes Unknown, Entered By History     metFORMIN (GLUCOPHAGE XR) 500 MG 24 hr tablet Take 500 mg by mouth daily (with dinner). 8/24/2024 at AM Yes Unknown, Entered By History Yes    naloxone (NARCAN) 4 MG/0.1ML nasal spray Spray 4 mg into one nostril alternating nostrils as needed for opioid reversal. every 2-3 minutes until assistance arrives Unknown at prn Yes Unknown, Entered By History No    OLANZapine (ZYPREXA) 5 MG tablet Take 5 mg by mouth as needed (anxiety). 8/23/2024 at PM Yes Unknown, Entered By History     paliperidone (INVEGA SUSTENNA) 156 MG/ML AUDREY injection Inject 156 mg into the muscle  every 28 days. 2024 at AM Yes Unknown, Entered By History Yes    traZODone (DESYREL) 50 MG tablet Take 50 mg by mouth at bedtime. 2024 at hs Yes Unknown, Entered By History Yes        ALLERGIES:   Patient has no known allergies.    SOCIAL HISTORY:   Social History     Socioeconomic History    Marital status: Legally      Spouse name: Not on file    Number of children: Not on file    Years of education: Not on file    Highest education level: Not on file   Occupational History    Not on file   Tobacco Use    Smoking status: Every Day     Current packs/day: 0.00     Average packs/day: 0.5 packs/day for 5.0 years (2.5 ttl pk-yrs)     Types: Cigarettes     Start date: 3/14/2023     Last attempt to quit: 2012     Years since quittin.6    Smokeless tobacco: Current    Tobacco comments:     e-cig   Substance and Sexual Activity    Alcohol use: No    Drug use: Not Currently     Types: Benzodiazepines     Comment: Drug use: In treatment currently (2017); wants help geting off of Suboxone and Valium;     Sexual activity: Not Currently     Partners: Male   Other Topics Concern    Not on file   Social History Narrative    Lives with family.      Social Determinants of Health     Financial Resource Strain: High Risk (2024)    Received from [facility]    Overall Financial Resource Strain (CARDIA)     Difficulty of Paying Living Expenses: Hard   Food Insecurity: Food Insecurity Present (2024)    Received from [facility]    Hunger Vital Sign     Worried About Running Out of Food in the Last Year: Often true     Ran Out of Food in the Last Year: Often true   Transportation Needs: Unmet Transportation Needs (2024)    Received from [facility]    PRAPARE - Transportation     Lack of Transportation (Medical): Yes     Lack of Transportation (Non-Medical): Yes   Physical Activity: Not on file   Stress: Not on file   Social Connections: Socially Integrated (3/31/2024)  "   Received from [facility]    Social Connections     Frequency of Communication with Friends and Family: 0   Interpersonal Safety: Not At Risk (5/18/2024)    Received from [facility]    Humiliation, Afraid, Rape, and Kick questionnaire     Fear of Current or Ex-Partner: No     Emotionally Abused: No     Physically Abused: No     Sexually Abused: No   Housing Stability: High Risk (5/18/2024)    Received from [facility]    Housing Stability     What is your housing situation today?: 1 - I do not have housing (I am staying with others, in a hotel, in a shelter, living outside on ...     Living situation: Group home in [place]  Occupation: Not currently working  Illicit Drugs: Polysubstance use with meth and fentanyl    FAMILY HISTORY:  Family History   Problem Relation Age of Onset    Ovarian Cancer Mother         In remission as of 2017    Substance Abuse Paternal Grandfather     Schizophrenia Brother     Substance Abuse Brother     Depression Brother     Mental Illness Brother     Depression Other        Patient denies known family history of bleeding, clotting, or anesthesia related complications.     REVIEW OF SYSTEMS:   10-point reviews of systems was negative except as noted above in the HPI.     PHYSICAL EXAM:   Vitals:    08/24/24 0906 08/24/24 1033 08/24/24 1045 08/24/24 1345   BP: 129/75 107/64 107/63 114/62   Pulse: 100 79 86    Resp: 18      Temp: 98.7  F (37.1  C)      TempSrc: Temporal      SpO2:  97% 95%    Weight: 74 kg (163 lb 2.3 oz)      Height: 1.702 m (5' 7\")        General: Awake, alert, appropriate, following commands, NAD.  Neuro: EOM grossly intact  Skin: No rashes,  skin color normal.  HEENT: Normal.   Lungs: Breathing comfortably and nonlabored, no wheezes or stridor noted.  Heart/Cardiovascular: Regular pulse, no peripheral cyanosis.    Left Upper Extremity:   - No gross deformity, skin " intact. Flexion contracture of the left small and ring fingers at the MP joints with 90 deg contracture, healed wound volar hand  - Significant tenderness to palpation over the left shoulder including the anterior, lateral and posterior aspect of the shoulder.  Significant tenderness palpation over the index and middle fingers.  - Middle finger has mild diffuse swelling, no erythema.  She holds this in a flexed position.  She does not tolerate passive extension of the finger and has very limited active flexion.  She has tenderness along the flexor tendon sheath to the level of the distal palmar crease.  -  index finger with very limited active flexion, tenderness palpation over the flexor sheath.  - Shoulder active range of motion 45 degrees of external rotation, limited forward flexion, 80 degrees abduction.  Passive range of motion 30 degrees forward flexion, 90 degrees abduction, 6 degrees of external rotation with pain  - No significant tenderness to palpation over sternoclavicular joint, clavicle, elbow, forearm  - Fires FPL, EPL, FDI, intrinsics  - SILT median, ulnar, radial, LABC axillary nerve distributions.  - Radial pulse palpable, fingers warm and well perfused.      LABS:  Recent Labs   Lab 08/24/24  1009   WBC 15.0*   HGB 12.3      CRPI 155.00*   SED 25*       IMAGING:  All imaging independently reviewed.    X-ray of the left shoulder 3 views AP internal rotation, external rotation and scapular Y on 8/24/2024 were dependently reviewed and demonstrate no acute fracture or dislocation.  Glenohumeral joint is concentric reduced on all views.  Mild degenerative change at the acromioclavicular joint.    X-rays of the left hand 3 views PA, lateral and oblique on 8/24/2024 were dependently reviewed and demonstrate no acute fracture or dislocation.  No significant degenerative changes.  Mild diffuse swelling in the left hand.    IMPRESSION:   Lauren Santiago is a 34 year old female with past medical  history significant for schizophrenia, gonorrhea infection, polysubstance abuse including meth and fentanyl with recent relapse who presents with severe left upper extremity pain.    The patient's picture is concerning for possible infection given her elevated inflammatory markers, however she does have a recent gonorrhea infection diagnosis.  She has not been fully treated with this to this point.  I do recommend further workup with blood cultures, MRI of the left hand and shoulder to evaluate for any underlying fluid collection, septic arthritis of the shoulder, abscess or flexor tenosynovitis.  The patient will also confirm that she relapsed on meth and fentanyl on Friday and was down for an unknown period of time.  She thinks that she is laying on her left side.  She certainly could have a compressive neuropathy in the left upper extremity contributing to her symptoms.  I recommend admission to the medicine team for further evaluation and pain control.  Also recommend further treatment with physical therapy for passive and active range of motion of the left shoulder, elbow, wrist and hand.  She will remain n.p.o. until MRI has been completed.  If there is fluid on the left shoulder MRI in the glenohumeral joint, we may consider an ultrasound-guided aspiration of the left shoulder.  Patient is very apprehensive about any procedures that may cause pain.  She takes Suboxone and does not feel that narcotic medication provides significant pain relief for her.    RECOMMENDATIONS:   - Admit to Medicine.  - Anticoagulation/DVT Prophylaxis: Recommend SCDs while in bed  - Antibiotics/Tetanus: Broad spectrum IV antibiotics per primary  - X-rays/Imaging: MRI left hand and shoulder  - Activity: up with assist  - Weight bearing: NWB LUE  - Pain control: Multimodal pain regimen per primary.  - Diet: NPO until imaging completed  - Follow-up: TBD pending further workup  - Disposition: TBD      Abhay Huynh MD  Hand and Upper  Extremity Surgeon  [facility]

## 2024-08-24 NOTE — H&P
"[facility]    History and Physical - Hospitalist Service       Date of Admission:  8/24/2024    Assessment & Plan      Lauren Santiago is a 34 year old female admitted on 8/24/2024 for left upper limb pain.  She has a past medical history of anxiety, PTSD and substance use disorder currently on Suboxone.    Infection of left hand/Pain of left upper arm  -Blood culture ordered but patient declined.  -1 dose of IV ceftriaxone and Zosyn given in ED.  -ID consult appreciated.  -Ortho consult appreciated.  -Discontinue IV Dilaudid for pain management    Anxiety  -Ativan as needed        Diet:  Regular  DVT Prophylaxis: Enoxaparin (Lovenox) SQ  Marks Catheter: Not present  Lines: None     Cardiac Monitoring: None  Code Status:  Full code    Clinically Significant Risk Factors Present on Admission                          # Overweight: Estimated body mass index is 25.55 kg/m  as calculated from the following:    Height as of this encounter: 1.702 m (5' 7\").    Weight as of this encounter: 74 kg (163 lb 2.3 oz).                Disposition Plan     Medically Ready for Discharge: Anticipated in 2-4 Days       Matt Aguirre DO  Hospitalist Service  [facility]  Securely message with [employer] (more info)  Text page via [employer] Paging/Directory     ______________________________________________________________________    Chief Complaint   Left upper limb pain    History is obtained from the patient as well as the ED physician.    History of Present Illness   Lauren Santiago is a 34 year old female who with a chief complaint of anxiety, GERD, substance use disorder presents with a chief complaint of left upper limb pain that has been happening for approximately 2 days.  She reported sleeping in a tent in a heavily [place]ed area on a friend's property and woke up with a throbbing left-sided neck pain that traveled down to her shoulder and arms to her left middle finger.  The pain is 10 out " of 10, exacerbated by movement alleviated by rest.  Orthopedics was consulted and recommended a hand MRI.  She is also concerned that the place she that might have had some broken bottles so she is concerned about HIV infection as well.      Past Medical History    Past Medical History:   Diagnosis Date    Anxiety 6/16/2015    GERD (gastroesophageal reflux disease) 5/23/2015    Headache     Migraine headache 6/23/2015    Mood disorder (H24) 6/16/2015    Opiate abuse, episodic (H)     Stopped and detoxed.    Panic disorder 6/23/2015    PTSD (post-traumatic stress disorder) 2009       Past Surgical History   No past surgical history on file.    Prior to Admission Medications   Prior to Admission Medications   Prescriptions Last Dose Informant Patient Reported? Taking?   OLANZapine (ZYPREXA) 5 MG tablet 8/23/2024 at PM  Yes Yes   Sig: Take 10 mg by mouth as needed (anxiety).   acetaminophen (TYLENOL) 325 MG tablet More than a month at prn  Yes Yes   Sig: Take 325-650 mg by mouth every 6 hours as needed for mild pain.   benztropine (COGENTIN) 0.5 MG tablet 8/24/2024 at AM  Yes Yes   Sig: Take 0.5 mg by mouth daily.   buprenorphine HCl-naloxone HCl (SUBOXONE) 8-2 MG per film 8/24/2024 at AM  Yes Yes   Sig: Place 1 Film under the tongue 2 times daily.   busPIRone (BUSPAR) 10 MG tablet 8/24/2024 at AM  Yes Yes   Sig: Take 10 mg by mouth 3 times daily.   gabapentin (NEURONTIN) 800 MG tablet 8/24/2024 at AM  Yes Yes   Sig: Take 800 mg by mouth 3 times daily.   hydrOXYzine HCl (ATARAX) 25 MG tablet 8/23/2024 at PM  Yes Yes   Sig: Take 25-75 mg by mouth 3 times daily as needed for anxiety.   melatonin 3 MG tablet 8/20/2024 at hs  Yes Yes   Sig: Take 6 mg by mouth nightly as needed for sleep.   metFORMIN (GLUCOPHAGE XR) 500 MG 24 hr tablet 8/24/2024 at AM  Yes Yes   Sig: Take 500 mg by mouth daily (with dinner).   naloxone (NARCAN) 4 MG/0.1ML nasal spray Unknown at prn  Yes Yes   Sig: Spray 4 mg into one nostril alternating  nostrils as needed for opioid reversal. every 2-3 minutes until assistance arrives   paliperidone (INVEGA SUSTENNA) 156 MG/ML AUDREY injection 2024 at AM  Yes Yes   Sig: Inject 156 mg into the muscle every 28 days.   traZODone (DESYREL) 50 MG tablet 2024 at hs  Yes Yes   Sig: Take 50 mg by mouth at bedtime.      Facility-Administered Medications Last Administration Doses Remaining   cefTRIAXone (ROCEPHIN) in lidocaine 1% (PF) for IM administration only 500 mg None recorded 1           Review of Systems    The 10 point Review of Systems is negative other than noted in the HPI or here.     Social History   I have reviewed this patient's social history and updated it with pertinent information if needed.  Social History     Tobacco Use    Smoking status: Every Day     Current packs/day: 0.00     Average packs/day: 0.5 packs/day for 5.0 years (2.5 ttl pk-yrs)     Types: Cigarettes     Start date: 3/14/2023     Last attempt to quit: 2012     Years since quittin.6    Smokeless tobacco: Current    Tobacco comments:     e-cig   Substance Use Topics    Alcohol use: No    Drug use: Not Currently     Types: Benzodiazepines     Comment: Drug use: In treatment currently (2017); wants help geting off of Suboxone and Valium;          Family History   I have reviewed this patient's family history and updated it with pertinent information if needed.  Family History   Problem Relation Age of Onset    Ovarian Cancer Mother         In remission as of     Substance Abuse Paternal Grandfather     Schizophrenia Brother     Substance Abuse Brother     Depression Brother     Mental Illness Brother     Depression Other          Allergies   No Known Allergies     Physical Exam   Vital Signs: Temp: 98.2  F (36.8  C) Temp src: Oral BP: 108/67 Pulse: 100   Resp: 16 SpO2: 97 % O2 Device: None (Room air)    Weight: 163 lbs 2.25 oz  GEN: The patient appears in mild distress  HEENT: NCAT, PERRLA, EOMI  CV: S1 & S2 positive.   Tachycardia  LUNGS: Bilateral breathsounds heard.   ABDOMEN: Positive bowel sounds in all quadrants, soft, no rebound or guarding  MUSCULOSKELETAL: No leg swelling.  The patient has left upper arm extremity with mild erythema.  Neurological: The patient appeared to have discomfort in her left upper extremity with pain worse in her left middle finger.      Medical Decision Making       45 MINUTES SPENT BY ME on the date of service doing chart review, history, exam, documentation & further activities per the note.      Data     I have personally reviewed the following data over the past 24 hrs:    15.0 (H)  \   12.3   / 191     137 98 14.5 /  136 (H)   4.1 26 0.68 \     Procal: N/A CRP: 155.00 (H) Lactic Acid: N/A         Imaging results reviewed over the past 24 hrs:   Recent Results (from the past 24 hour(s))   MR Cervical Spine w/o & w Contrast    Narrative    EXAM: MR CERVICAL SPINE W/O and W CONTRAST  LOCATION: [facility]  DATE: 8/24/2024    INDICATION: Left arm radiculopathy; Neck pain; Neck pain, no complicating feature; No chronic neck pain >= 3 months  COMPARISON: None.  CONTRAST: 7ml Gadavist  TECHNIQUE: MRI Cervical Spine without and with IV contrast.    FINDINGS:   Normal craniocervical and atlantoaxial. Reversal normal cervical lordosis. Vertebral body heights are maintained. No pathologic marrow signal or enhancement. No abnormal cord signal. No abnormal intramedullary or leptomeningeal enhancement. No acute   extraspinal abnormality. Inclusion cyst in the subcutaneous fat overlying the right sternomastoid muscle and dorsal to the parotid gland.    Craniovertebral junction and C1-C2: Normal.    C2-C3: Normal disc height. No herniation. Normal facets. No spinal canal or neural foraminal stenosis.     C3-C4: Normal disc height. Shallow broad central disc protrusion. Right uncovertebral hypertrophy. Normal facets. Mild spinal canal stenosis. Mild right neural foraminal stenosis. No  left neural foraminal stenosis.     C4-C5: Normal disc height. No herniation. Mild right facet arthropathy. No spinal canal or neural foraminal stenosis.     C5-C6: Moderate disc height loss. Shallow posterior disc osteophyte complex. Right greater than left uncovertebral hypertrophy. Mild spinal canal stenosis. Moderate right neural foraminal stenosis. Mild left neural foraminal stenosis.     C6-C7: Mild disc height loss. Shallow broad central disc protrusion narrows the ventral CSF space. Left uncovertebral vertebrae. Normal facets. No spinal canal stenosis. No right neural foraminal stenosis. Mild left neural foraminal stenosis     C7-T1: Normal disc height. No herniation. Normal facets. No spinal canal or neural foraminal stenosis.      Impression    IMPRESSION:  1.  Multilevel cervical spondylosis.  2.  No high-grade spinal canal stenosis. Moderate neural foraminal stenosis on the right at C5-C6.     XR Hand Left G/E 3 Views    Narrative    EXAM: XR HAND LEFT G/E 3 VIEWS  LOCATION: [facility]  DATE: 8/24/2024    INDICATION: left index and middle finger pain  COMPARISON: None.      Impression    IMPRESSION:     Flexion of the fingers limits evaluation of the middle and distal phalanges on the AP and oblique views. No definite displaced fracture is identified. Consider dedicated views of the fingers for better evaluation as clinically indicated.   XR Shoulder Left G/E 3 Views    Narrative    EXAM: XR SHOULDER LEFT G/E 3 VIEWS  LOCATION: [facility]  DATE: 8/24/2024    INDICATION: left shoulder pain  COMPARISON: None.      Impression    IMPRESSION: Normal joint spaces and alignment. No fracture.     Recent Labs   Lab 08/24/24  1009   WBC 15.0*   HGB 12.3   MCV 86         POTASSIUM 4.1   CHLORIDE 98   CO2 26   BUN 14.5   CR 0.68   ANIONGAP 13   EJ 9.3   *

## 2024-08-25 ENCOUNTER — ANESTHESIA (OUTPATIENT)
Dept: MEDSURG UNIT | Facility: HOSPITAL | Age: 34
DRG: 558 | End: 2024-08-25
Payer: COMMERCIAL

## 2024-08-25 ENCOUNTER — APPOINTMENT (OUTPATIENT)
Dept: MRI IMAGING | Facility: HOSPITAL | Age: 34
DRG: 558 | End: 2024-08-25
Attending: STUDENT IN AN ORGANIZED HEALTH CARE EDUCATION/TRAINING PROGRAM
Payer: COMMERCIAL

## 2024-08-25 ENCOUNTER — ANESTHESIA EVENT (OUTPATIENT)
Dept: MEDSURG UNIT | Facility: HOSPITAL | Age: 34
DRG: 558 | End: 2024-08-25
Payer: COMMERCIAL

## 2024-08-25 VITALS
TEMPERATURE: 99.2 F | HEIGHT: 67 IN | RESPIRATION RATE: 20 BRPM | OXYGEN SATURATION: 93 % | BODY MASS INDEX: 25.61 KG/M2 | SYSTOLIC BLOOD PRESSURE: 119 MMHG | WEIGHT: 163.14 LBS | HEART RATE: 123 BPM | DIASTOLIC BLOOD PRESSURE: 72 MMHG

## 2024-08-25 LAB
AMPHETAMINES UR QL SCN: ABNORMAL
ANION GAP SERPL CALCULATED.3IONS-SCNC: 9 MMOL/L (ref 7–15)
BARBITURATES UR QL SCN: ABNORMAL
BASOPHILS # BLD AUTO: 0.1 10E3/UL (ref 0–0.2)
BASOPHILS NFR BLD AUTO: 1 %
BENZODIAZ UR QL SCN: ABNORMAL
BUN SERPL-MCNC: 8.2 MG/DL (ref 6–20)
BZE UR QL SCN: ABNORMAL
CALCIUM SERPL-MCNC: 8.9 MG/DL (ref 8.8–10.4)
CANNABINOIDS UR QL SCN: ABNORMAL
CHLORIDE SERPL-SCNC: 100 MMOL/L (ref 98–107)
CREAT SERPL-MCNC: 0.7 MG/DL (ref 0.51–0.95)
CRP SERPL-MCNC: 134.9 MG/L
EGFRCR SERPLBLD CKD-EPI 2021: >90 ML/MIN/1.73M2
EOSINOPHIL # BLD AUTO: 0.3 10E3/UL (ref 0–0.7)
EOSINOPHIL NFR BLD AUTO: 3 %
ERYTHROCYTE [DISTWIDTH] IN BLOOD BY AUTOMATED COUNT: 13.1 % (ref 10–15)
FENTANYL UR QL: ABNORMAL
GLUCOSE BLDC GLUCOMTR-MCNC: 114 MG/DL (ref 70–99)
GLUCOSE BLDC GLUCOMTR-MCNC: 151 MG/DL (ref 70–99)
GLUCOSE BLDC GLUCOMTR-MCNC: 169 MG/DL (ref 70–99)
GLUCOSE SERPL-MCNC: 129 MG/DL (ref 70–99)
HBA1C MFR BLD: 5.6 %
HCG UR QL: NEGATIVE
HCO3 SERPL-SCNC: 30 MMOL/L (ref 22–29)
HCT VFR BLD AUTO: 34.9 % (ref 35–47)
HGB BLD-MCNC: 12 G/DL (ref 11.7–15.7)
HIV 1+2 AB+HIV1 P24 AG SERPL QL IA: NONREACTIVE
IMM GRANULOCYTES # BLD: 0.1 10E3/UL
IMM GRANULOCYTES NFR BLD: 0 %
LYMPHOCYTES # BLD AUTO: 2.4 10E3/UL (ref 0.8–5.3)
LYMPHOCYTES NFR BLD AUTO: 19 %
MAGNESIUM SERPL-MCNC: 2.1 MG/DL (ref 1.7–2.3)
MCH RBC QN AUTO: 29.6 PG (ref 26.5–33)
MCHC RBC AUTO-ENTMCNC: 34.4 G/DL (ref 31.5–36.5)
MCV RBC AUTO: 86 FL (ref 78–100)
MONOCYTES # BLD AUTO: 1.3 10E3/UL (ref 0–1.3)
MONOCYTES NFR BLD AUTO: 11 %
NEUTROPHILS # BLD AUTO: 8.3 10E3/UL (ref 1.6–8.3)
NEUTROPHILS NFR BLD AUTO: 66 %
NRBC # BLD AUTO: 0 10E3/UL
NRBC BLD AUTO-RTO: 0 /100
OPIATES UR QL SCN: ABNORMAL
PCP QUAL URINE (ROCHE): ABNORMAL
PLATELET # BLD AUTO: 200 10E3/UL (ref 150–450)
POTASSIUM SERPL-SCNC: 3.7 MMOL/L (ref 3.4–5.3)
RBC # BLD AUTO: 4.06 10E6/UL (ref 3.8–5.2)
RETICS # AUTO: 0.06 10E6/UL (ref 0.03–0.1)
RETICS/RBC NFR AUTO: 1.5 % (ref 0.5–2)
SODIUM SERPL-SCNC: 139 MMOL/L (ref 135–145)
WBC # BLD AUTO: 12.4 10E3/UL (ref 4–11)

## 2024-08-25 PROCEDURE — 99207 PR APP CREDIT; MD BILLING SHARED VISIT: CPT

## 2024-08-25 PROCEDURE — 99204 OFFICE O/P NEW MOD 45 MIN: CPT | Performed by: INTERNAL MEDICINE

## 2024-08-25 PROCEDURE — 85060 BLOOD SMEAR INTERPRETATION: CPT | Performed by: PATHOLOGY

## 2024-08-25 PROCEDURE — 80048 BASIC METABOLIC PNL TOTAL CA: CPT | Performed by: INTERNAL MEDICINE

## 2024-08-25 PROCEDURE — 86140 C-REACTIVE PROTEIN: CPT | Performed by: STUDENT IN AN ORGANIZED HEALTH CARE EDUCATION/TRAINING PROGRAM

## 2024-08-25 PROCEDURE — 999N000141 HC STATISTIC PRE-PROCEDURE NURSING ASSESSMENT: Performed by: STUDENT IN AN ORGANIZED HEALTH CARE EDUCATION/TRAINING PROGRAM

## 2024-08-25 PROCEDURE — 81025 URINE PREGNANCY TEST: CPT

## 2024-08-25 PROCEDURE — G0378 HOSPITAL OBSERVATION PER HR: HCPCS

## 2024-08-25 PROCEDURE — 87040 BLOOD CULTURE FOR BACTERIA: CPT | Performed by: EMERGENCY MEDICINE

## 2024-08-25 PROCEDURE — 36415 COLL VENOUS BLD VENIPUNCTURE: CPT | Performed by: INTERNAL MEDICINE

## 2024-08-25 PROCEDURE — 85045 AUTOMATED RETICULOCYTE COUNT: CPT | Performed by: INTERNAL MEDICINE

## 2024-08-25 PROCEDURE — 80307 DRUG TEST PRSMV CHEM ANLYZR: CPT

## 2024-08-25 PROCEDURE — 87389 HIV-1 AG W/HIV-1&-2 AB AG IA: CPT | Performed by: INTERNAL MEDICINE

## 2024-08-25 PROCEDURE — 250N000013 HC RX MED GY IP 250 OP 250 PS 637: Performed by: INTERNAL MEDICINE

## 2024-08-25 PROCEDURE — 99239 HOSP IP/OBS DSCHRG MGMT >30: CPT | Performed by: FAMILY MEDICINE

## 2024-08-25 PROCEDURE — 83735 ASSAY OF MAGNESIUM: CPT | Performed by: INTERNAL MEDICINE

## 2024-08-25 PROCEDURE — 120N000001 HC R&B MED SURG/OB

## 2024-08-25 PROCEDURE — 82962 GLUCOSE BLOOD TEST: CPT

## 2024-08-25 PROCEDURE — 258N000003 HC RX IP 258 OP 636: Performed by: FAMILY MEDICINE

## 2024-08-25 PROCEDURE — 250N000011 HC RX IP 250 OP 636: Performed by: FAMILY MEDICINE

## 2024-08-25 PROCEDURE — 73221 MRI JOINT UPR EXTREM W/O DYE: CPT | Mod: LT

## 2024-08-25 PROCEDURE — 250N000012 HC RX MED GY IP 250 OP 636 PS 637: Performed by: INTERNAL MEDICINE

## 2024-08-25 PROCEDURE — 96376 TX/PRO/DX INJ SAME DRUG ADON: CPT

## 2024-08-25 PROCEDURE — 83036 HEMOGLOBIN GLYCOSYLATED A1C: CPT | Performed by: INTERNAL MEDICINE

## 2024-08-25 PROCEDURE — 250N000011 HC RX IP 250 OP 636: Performed by: INTERNAL MEDICINE

## 2024-08-25 PROCEDURE — 36415 COLL VENOUS BLD VENIPUNCTURE: CPT | Performed by: EMERGENCY MEDICINE

## 2024-08-25 PROCEDURE — 85025 COMPLETE CBC W/AUTO DIFF WBC: CPT | Performed by: INTERNAL MEDICINE

## 2024-08-25 RX ORDER — NALOXONE HYDROCHLORIDE 0.4 MG/ML
0.1 INJECTION, SOLUTION INTRAMUSCULAR; INTRAVENOUS; SUBCUTANEOUS
Status: CANCELLED | OUTPATIENT
Start: 2024-08-25

## 2024-08-25 RX ORDER — FENTANYL CITRATE 50 UG/ML
50 INJECTION, SOLUTION INTRAMUSCULAR; INTRAVENOUS EVERY 5 MIN PRN
Status: CANCELLED | OUTPATIENT
Start: 2024-08-25

## 2024-08-25 RX ORDER — NALOXONE HYDROCHLORIDE 0.4 MG/ML
0.2 INJECTION, SOLUTION INTRAMUSCULAR; INTRAVENOUS; SUBCUTANEOUS
Status: CANCELLED | OUTPATIENT
Start: 2024-08-25

## 2024-08-25 RX ORDER — CEFAZOLIN SODIUM 1 G/50ML
1250 SOLUTION INTRAVENOUS EVERY 12 HOURS
Status: DISCONTINUED | OUTPATIENT
Start: 2024-08-25 | End: 2024-08-25 | Stop reason: DRUGHIGH

## 2024-08-25 RX ORDER — HYDROMORPHONE HCL IN WATER/PF 6 MG/30 ML
0.2 PATIENT CONTROLLED ANALGESIA SYRINGE INTRAVENOUS EVERY 5 MIN PRN
Status: CANCELLED | OUTPATIENT
Start: 2024-08-25

## 2024-08-25 RX ORDER — ONDANSETRON 4 MG/1
4 TABLET, ORALLY DISINTEGRATING ORAL EVERY 30 MIN PRN
Status: CANCELLED | OUTPATIENT
Start: 2024-08-25

## 2024-08-25 RX ORDER — DEXAMETHASONE SODIUM PHOSPHATE 4 MG/ML
4 INJECTION, SOLUTION INTRA-ARTICULAR; INTRALESIONAL; INTRAMUSCULAR; INTRAVENOUS; SOFT TISSUE
Status: CANCELLED | OUTPATIENT
Start: 2024-08-25

## 2024-08-25 RX ORDER — CEFAZOLIN SODIUM/WATER 2 G/20 ML
2 SYRINGE (ML) INTRAVENOUS
Status: DISCONTINUED | OUTPATIENT
Start: 2024-08-25 | End: 2024-08-25

## 2024-08-25 RX ORDER — NALOXONE HYDROCHLORIDE 0.4 MG/ML
0.4 INJECTION, SOLUTION INTRAMUSCULAR; INTRAVENOUS; SUBCUTANEOUS
Status: CANCELLED | OUTPATIENT
Start: 2024-08-25

## 2024-08-25 RX ORDER — ONDANSETRON 2 MG/ML
4 INJECTION INTRAMUSCULAR; INTRAVENOUS EVERY 30 MIN PRN
Status: CANCELLED | OUTPATIENT
Start: 2024-08-25

## 2024-08-25 RX ORDER — SODIUM CHLORIDE, SODIUM LACTATE, POTASSIUM CHLORIDE, CALCIUM CHLORIDE 600; 310; 30; 20 MG/100ML; MG/100ML; MG/100ML; MG/100ML
INJECTION, SOLUTION INTRAVENOUS CONTINUOUS
Status: CANCELLED | OUTPATIENT
Start: 2024-08-25

## 2024-08-25 RX ORDER — FENTANYL CITRATE 50 UG/ML
25-50 INJECTION, SOLUTION INTRAMUSCULAR; INTRAVENOUS
Status: CANCELLED | OUTPATIENT
Start: 2024-08-25

## 2024-08-25 RX ORDER — CEFAZOLIN SODIUM/WATER 2 G/20 ML
2 SYRINGE (ML) INTRAVENOUS SEE ADMIN INSTRUCTIONS
Status: DISCONTINUED | OUTPATIENT
Start: 2024-08-25 | End: 2024-08-25

## 2024-08-25 RX ORDER — LIDOCAINE 40 MG/G
CREAM TOPICAL
Status: CANCELLED | OUTPATIENT
Start: 2024-08-25

## 2024-08-25 RX ORDER — OXYCODONE HYDROCHLORIDE 5 MG/1
5 TABLET ORAL
Status: CANCELLED | OUTPATIENT
Start: 2024-08-25

## 2024-08-25 RX ORDER — FENTANYL CITRATE 50 UG/ML
25 INJECTION, SOLUTION INTRAMUSCULAR; INTRAVENOUS EVERY 5 MIN PRN
Status: CANCELLED | OUTPATIENT
Start: 2024-08-25

## 2024-08-25 RX ORDER — CEFAZOLIN SODIUM 1 G/50ML
1250 SOLUTION INTRAVENOUS EVERY 12 HOURS
Status: DISCONTINUED | OUTPATIENT
Start: 2024-08-25 | End: 2024-08-25 | Stop reason: HOSPADM

## 2024-08-25 RX ORDER — HYDROMORPHONE HCL IN WATER/PF 6 MG/30 ML
0.4 PATIENT CONTROLLED ANALGESIA SYRINGE INTRAVENOUS EVERY 5 MIN PRN
Status: CANCELLED | OUTPATIENT
Start: 2024-08-25

## 2024-08-25 RX ORDER — FLUMAZENIL 0.1 MG/ML
0.2 INJECTION, SOLUTION INTRAVENOUS
Status: CANCELLED | OUTPATIENT
Start: 2024-08-25

## 2024-08-25 RX ORDER — OXYCODONE HYDROCHLORIDE 5 MG/1
10 TABLET ORAL
Status: CANCELLED | OUTPATIENT
Start: 2024-08-25

## 2024-08-25 RX ADMIN — SODIUM CHLORIDE 1500 MG: 9 INJECTION, SOLUTION INTRAVENOUS at 10:22

## 2024-08-25 RX ADMIN — GABAPENTIN 800 MG: 400 CAPSULE ORAL at 08:09

## 2024-08-25 RX ADMIN — BUSPIRONE HYDROCHLORIDE 10 MG: 10 TABLET ORAL at 08:07

## 2024-08-25 RX ADMIN — HYDROMORPHONE HYDROCHLORIDE 0.5 MG: 1 INJECTION, SOLUTION INTRAMUSCULAR; INTRAVENOUS; SUBCUTANEOUS at 05:42

## 2024-08-25 RX ADMIN — PIPERACILLIN AND TAZOBACTAM 3.38 G: 3; .375 INJECTION, POWDER, FOR SOLUTION INTRAVENOUS at 05:42

## 2024-08-25 RX ADMIN — GABAPENTIN 800 MG: 400 CAPSULE ORAL at 13:21

## 2024-08-25 RX ADMIN — BUSPIRONE HYDROCHLORIDE 10 MG: 10 TABLET ORAL at 13:21

## 2024-08-25 RX ADMIN — LORAZEPAM 0.5 MG: 0.5 TABLET ORAL at 05:46

## 2024-08-25 RX ADMIN — BENZTROPINE MESYLATE 0.5 MG: 0.5 TABLET ORAL at 08:08

## 2024-08-25 RX ADMIN — PIPERACILLIN AND TAZOBACTAM 3.38 G: 3; .375 INJECTION, POWDER, FOR SOLUTION INTRAVENOUS at 12:08

## 2024-08-25 RX ADMIN — SENNOSIDES AND DOCUSATE SODIUM 1 TABLET: 8.6; 5 TABLET ORAL at 08:07

## 2024-08-25 RX ADMIN — LORAZEPAM 0.5 MG: 0.5 TABLET ORAL at 13:25

## 2024-08-25 RX ADMIN — HYDROMORPHONE HYDROCHLORIDE 0.5 MG: 1 INJECTION, SOLUTION INTRAMUSCULAR; INTRAVENOUS; SUBCUTANEOUS at 13:25

## 2024-08-25 RX ADMIN — INSULIN ASPART 1 UNITS: 100 INJECTION, SOLUTION INTRAVENOUS; SUBCUTANEOUS at 09:16

## 2024-08-25 RX ADMIN — HYDROXYZINE HYDROCHLORIDE 50 MG: 50 TABLET, FILM COATED ORAL at 09:11

## 2024-08-25 ASSESSMENT — ACTIVITIES OF DAILY LIVING (ADL)
ADLS_ACUITY_SCORE: 20
ADLS_ACUITY_SCORE: 19
ADLS_ACUITY_SCORE: 20

## 2024-08-25 NOTE — PLAN OF CARE
PRIMARY DIAGNOSIS: ACUTE PAIN  OUTPATIENT/OBSERVATION GOALS TO BE MET BEFORE DISCHARGE:  1. Pain Status: No improvement noted. Consider adjustment in pain regimen.    2. Return to near baseline physical activity: Yes    3. Cleared for discharge by consultants (if involved): No    Discharge Planner Nurse   Safe discharge environment identified: Yes  Barriers to discharge: Yes       Entered by: Brittany Silverman RN 08/24/2024 7:29 PM     Please review provider order for any additional goals.   Nurse to notify provider when observation goals have been met and patient is ready for discharge.Goal Outcome Evaluation:  New admit from ED. Requesting pain medication, awaiting MD orders.

## 2024-08-25 NOTE — PLAN OF CARE
Problem: Pain Acute  Goal: Optimal Pain Control and Function  Outcome: Progressing  Intervention: Develop Pain Management Plan  Recent Flowsheet Documentation  Taken 8/24/2024 2351 by Francisco Dennison RN  Pain Management Interventions:   relaxation techniques promoted   repositioned   rest     Problem: Infection  Goal: Absence of Infection Signs and Symptoms  Outcome: Progressing     Problem: Comorbidity Management  Goal: Blood Glucose Levels Within Targeted Range  Outcome: Progressing   Goal Outcome Evaluation:PRIMARY DIAGNOSIS: SOFT TISSUE INFECTIONS  OUTPATIENT/OBSERVATION GOALS TO BE MET BEFORE DISCHARGE:  Vitals sign stable or return to baseline:  pt refusing assessment    Tolerating oral antibiotics or has home infusion set up if applicable:  IV Abx     Pain status: pt stating pain medication not as effective.    Return to near baseline physical activity: Yes    Discharge Planner Nurse   Safe discharge environment identified:  pt [facility]  Barriers to discharge: Yes       Entered by: Francisco Dennison RN 08/25/2024 4:42 AM     Please review provider order for any additional goals.     Nurse to notify provider when observation goals have been met and patient is ready for discharge.       Pt appears to be alert and oriented. Pt c/o severe pain and requested IV dilaudid and IV Ativan. Provider denied request. Pt still has IV Dilaudid and Oral Ativan. Pt is refusing staff to complete a full assessment. Pt does not appear SOB. Able to get from chair to bed with minimal assistance.

## 2024-08-25 NOTE — PLAN OF CARE
Problem: Pain Acute  Goal: Optimal Pain Control and Function  Intervention: Develop Pain Management Plan  Recent Flowsheet Documentation  Taken 8/24/2024 2217 by Brittany Silverman, RN  Pain Management Interventions: medication (see MAR)  Taken 8/24/2024 1709 by Brittany Silverman, RN  Pain Management Interventions: medication (see MAR)     Problem: Infection  Goal: Absence of Infection Signs and Symptoms  Outcome: Progressing   Goal Outcome Evaluation:         Heart rate occasional tachy, frustrated most of the shift. Patient unhappy about pain regimen, MD update and changes made. Patient refusing to have lab work done including blood cultures, patient refused Lovenox shout. Up in chair with pillows in place. Continue to monitor.

## 2024-08-25 NOTE — PROGRESS NOTES
[facility]    Medicine Progress Note - Hospitalist Service    Date of Admission:  8/24/2024    Assessment & Plan      Lauren Santiago is a 34 year old female admitted on 8/24/2024 with a history of schizophrenia polysubstance abuse and anxiety presenting with left upper limb pain.  She is currently on Suboxone.  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  Patient recently diagnosed with gonorrhea treated with ceftriaxone IM on 8/21.  MRI of hand show fluid in left middle finger concerning for flexor tenosynovitis.  MRI of left shoulder pending. She complains of increased left arm pain and swelling.  Patient denies fever chills. Requesting muscle relaxant for pain.  Patient to have I&D this afternoon for debridement and irrigation with orthopedic surgery.  Infectious disease following recommending  IV Zosyn and IV vancomycin.  Blood cultures pending.    #Infection of left hand/Pain of left upper arm  -Blood culture ordered but patient declined.   IV Zosyn given in ED.  -ID consult : IV Zosyn and IV Vanco  -Ortho consult appreciated.  - IV Dilaudid for pain management  MRI cervical spine results: Multilevel cervical spondylosis. No high-grade spinal canal stenosis. Moderate neural foraminal stenosis on the right at C5-C6.   Left shoulder x-ray results :normal joint spaces and alignment. No fracture.   Left hand x-ray results:Flexion of the fingers limits evaluation of the middle and distal phalanges on the AP and oblique views. No definite displaced fracture is identified. Consider dedicated views of the fingers for better evaluation as clinically indicated.   MRI of hand results:There does appear to be tenosynovitis involving the third flexor tendon at the level of the phalanges and extending proximally to the level of the mid third metacarpal. This is nonspecific but could be infectious. There is some soft tissue edema over the    third finger and over the volar aspect of the  "distal palm at the level of the second and third metacarpals suspicious for cellulitis. No drainable soft tissue collection is identified. No definite evidence for osteomyelitis within the visualized bones  MRI of shoulder results pending    #Anxiety  -Ativan as needed  -Zyprexa as needed  -Hydroxyzine as needed  -Trazodone at bedtime    # Schizophrenia  On home paliperidone-IM injection every 28 days  BuSpar    # Diabetic mellitus  Glucophage held  Accu-Cheks stable at this time  Sliding scale insulin    # Polysubstance abuse  Case management consulted recommendations pending  Resume home Suboxone  Recent hospitalization meth induced psychosis 6/2024    # Gonorrhea  Patient was tested because she had exposure  IM Rocephin given on 821        Diet: NPO per Anesthesia Guidelines for Procedure/Surgery Except for: Meds    DVT Prophylaxis: Enoxaparin (Lovenox) SQ  Marks Catheter: Not present  Lines: None     Cardiac Monitoring: None  Code Status: Full Code      Clinically Significant Risk Factors Present on Admission                          # Overweight: Estimated body mass index is 25.55 kg/m  as calculated from the following:    Height as of this encounter: 1.702 m (5' 7\").    Weight as of this encounter: 74 kg (163 lb 2.3 oz).                    Disposition Plan     Medically Ready for Discharge: Anticipated in 2-4 Days           The patient's care was discussed with the Attending Physician, Dr. Grimaldo .    Lourdes Shabazz NP  Hospitalist Service  [facility]  Securely message with [employer] (more info)  Text page via [facility] Paging/Directory   ______________________________________________________________________    Interval History   Patient to remain n.p.o.   I&D scheduled with surgery this afternoon because patient ate this morning  Surgery I&D this afternoon  Infectious disease consult: Continue IV Vanco and IV Zosyn, blood culture pending.  No stigmata on exam for endocarditis  Orthopedic " consult: Plan for left hand irrigation debridement and left shoulder aspiration.   MRI of hand results fluid in left middle finger concerning for flexor tenosynovitis  MRI of left shoulder results pending  Patient complaining of increased pain requesting muscle relaxant Ativan given  Recent gonorrhea diagnosis patient received ceftriaxone IM yesterday  Case management consult for discharge planning and drug addiction resources    Physical Exam   Vital Signs: Temp: 99.2  F (37.3  C) Temp src: Oral BP: 119/72 Pulse: (!) 123   Resp: 20 SpO2: 93 % O2 Device: None (Room air)    Weight: 163 lbs 2.25 oz    Constitutional: awake, alert, cooperative, no apparent distress, and appears stated age  Hematologic / Lymphatic: no cervical lymphadenopathy and no supraclavicular lymphadenopathy  Respiratory: No increased work of breathing, good air exchange, clear to auscultation bilaterally, no crackles or wheezing  Cardiovascular: Normal apical impulse, regular rate and rhythm, normal S1 and S2, no S3 or S4, and no murmur noted  GI: No scars, normal bowel sounds, soft, non-distended, non-tender, no masses palpated, no hepatosplenomegally  Skin: no bruising or bleeding, normal skin color, texture, turgor, no redness, warmth, or swelling, no rashes, and no lesions  Musculoskeletal: Left index and middle finger swelling limited movement.  Left arm swelling motor strength is 5 out of 5 all extremities bilaterally.  Left shoulder tender.  Limited mobility left arm.  Positive CMS left upper extremity  Neurologic: Awake, alert, oriented to name, place and time.  Cranial nerves II-XII are grossly intact.  Motor is 5 out of 5 bilaterally.  Cerebellar finger to nose, heel to shin intact.  Sensory is intact.  Babinski down going, Romberg negative, and gait is normal.  Neuropsychiatric: General: normal, calm, and normal eye contact    Medical Decision Making       50 MINUTES SPENT BY ME on the date of service doing chart review, history, exam,  documentation & further activities per the note.      Data     I have personally reviewed the following data over the past 24 hrs:    12.4 (H)  \   12.0   / 200     139 100 8.2 /  151 (H)   3.7 30 (H) 0.70 \     TSH: N/A T4: N/A A1C: 5.6     Procal: N/A CRP: 134.90 (H) Lactic Acid: N/A       Ferritin:  N/A % Retic:  1.5 LDH:  N/A       Imaging results reviewed over the past 24 hrs:   Recent Results (from the past 24 hour(s))   MR Cervical Spine w/o & w Contrast    Narrative    EXAM: MR CERVICAL SPINE W/O and W CONTRAST  LOCATION: [facility]  DATE: 8/24/2024    INDICATION: Left arm radiculopathy; Neck pain; Neck pain, no complicating feature; No chronic neck pain >= 3 months  COMPARISON: None.  CONTRAST: 7ml Gadavist  TECHNIQUE: MRI Cervical Spine without and with IV contrast.    FINDINGS:   Normal craniocervical and atlantoaxial. Reversal normal cervical lordosis. Vertebral body heights are maintained. No pathologic marrow signal or enhancement. No abnormal cord signal. No abnormal intramedullary or leptomeningeal enhancement. No acute   extraspinal abnormality. Inclusion cyst in the subcutaneous fat overlying the right sternomastoid muscle and dorsal to the parotid gland.    Craniovertebral junction and C1-C2: Normal.    C2-C3: Normal disc height. No herniation. Normal facets. No spinal canal or neural foraminal stenosis.     C3-C4: Normal disc height. Shallow broad central disc protrusion. Right uncovertebral hypertrophy. Normal facets. Mild spinal canal stenosis. Mild right neural foraminal stenosis. No left neural foraminal stenosis.     C4-C5: Normal disc height. No herniation. Mild right facet arthropathy. No spinal canal or neural foraminal stenosis.     C5-C6: Moderate disc height loss. Shallow posterior disc osteophyte complex. Right greater than left uncovertebral hypertrophy. Mild spinal canal stenosis. Moderate right neural foraminal stenosis. Mild left neural foraminal stenosis.      C6-C7: Mild disc height loss. Shallow broad central disc protrusion narrows the ventral CSF space. Left uncovertebral vertebrae. Normal facets. No spinal canal stenosis. No right neural foraminal stenosis. Mild left neural foraminal stenosis     C7-T1: Normal disc height. No herniation. Normal facets. No spinal canal or neural foraminal stenosis.      Impression    IMPRESSION:  1.  Multilevel cervical spondylosis.  2.  No high-grade spinal canal stenosis. Moderate neural foraminal stenosis on the right at C5-C6.     XR Hand Left G/E 3 Views    Narrative    EXAM: XR HAND LEFT G/E 3 VIEWS  LOCATION: [facility]  DATE: 8/24/2024    INDICATION: left index and middle finger pain  COMPARISON: None.      Impression    IMPRESSION:     Flexion of the fingers limits evaluation of the middle and distal phalanges on the AP and oblique views. No definite displaced fracture is identified. Consider dedicated views of the fingers for better evaluation as clinically indicated.   XR Shoulder Left G/E 3 Views    Narrative    EXAM: XR SHOULDER LEFT G/E 3 VIEWS  LOCATION: [facility]  DATE: 8/24/2024    INDICATION: left shoulder pain  COMPARISON: None.      Impression    IMPRESSION: Normal joint spaces and alignment. No fracture.   MR Hand Left w/o Contrast    Narrative    EXAM: MR HAND LEFT W/O CONTRAST  LOCATION: [facility]  DATE: 8/24/2024    INDICATION: Possible tendon sheath infection.  COMPARISON: None.  TECHNIQUE: Unenhanced.      Impression    IMPRESSION:     Exam is limited as the patient was unable to straighten her fingers for the exam, which results in portions of the second, third and fourth fingers extending beyond the field-of-view. The patient terminated the exam early before additional sequences   could be obtained to cover these areas.     There does appear to be tenosynovitis involving the third flexor tendon at the level of the phalanges and  extending proximally to the level of the mid third metacarpal. This is nonspecific but could be infectious. There is some soft tissue edema over the   third finger and over the volar aspect of the distal palm at the level of the second and third metacarpals suspicious for cellulitis. No drainable soft tissue collection is identified. No definite evidence for osteomyelitis within the visualized bones.

## 2024-08-25 NOTE — PROGRESS NOTES
Patient is now inpatient. Patient ate Swiss Roll this am at 0700 - surgery team aware and pushed back I & D of left hand to the last case this afternoon. A partial 12 pack of Mountain Dew, a partial bag of Jv's pieces, and a Strawberry e cigarette device was removed from her room and placed in the Short Stay medication room where it is locked. It is in a patient belongings back with patient name and room number on it. Patient refuses SCDs.

## 2024-08-25 NOTE — PLAN OF CARE
PRIMARY DIAGNOSIS: ACUTE PAIN  OUTPATIENT/OBSERVATION GOALS TO BE MET BEFORE DISCHARGE:  1. Pain Status: Improved but still requiring IV narcotics.    2. Return to near baseline physical activity: Yes    3. Cleared for discharge by consultants (if involved): No    Discharge Planner Nurse   Safe discharge environment identified: Yes  Barriers to discharge: Yes       Entered by: Brittany Silverman RN 08/24/2024 11:46 PM     Please review provider order for any additional goals.   Nurse to notify provider when observation goals have been met and patient is ready for discharge.Goal Outcome Evaluation:

## 2024-08-25 NOTE — PLAN OF CARE
PRIMARY DIAGNOSIS: ACUTE PAIN  OUTPATIENT/OBSERVATION GOALS TO BE MET BEFORE DISCHARGE:  1. Pain Status: No improvement noted. Consider adjustment in pain regimen.    2. Return to near baseline physical activity: Yes    3. Cleared for discharge by consultants (if involved): No    Discharge Planner Nurse   Safe discharge environment identified: Yes  Barriers to discharge: Yes       Entered by: Brittany Silverman RN 08/24/2024 7:32 PM     Please review provider order for any additional goals.   Nurse to notify provider when observation goals have been met and patient is ready for discharge.PRIMARY DIAGNOSIS: ACUTE PAIN  OUTPATIENT/OBSERVATION GOALS TO BE MET BEFORE DISCHARGE:  1. Pain Status: No improvement noted. Consider adjustment in pain regimen.    2. Return to near baseline physical activity: Yes    3. Cleared for discharge by consultants (if involved): No    Discharge Planner Nurse   Safe discharge environment identified: Yes  Barriers to discharge: Yes       Entered by: Brittany Silverman RN 08/24/2024 7:31 PM     Please review provider order for any additional goals.   Nurse to notify provider when observation goals have been met and patient is ready for discharge.Goal Outcome Evaluation:  MD notified  multiple times via vocera messaging, patient requesting Ativan and IV pain medication. MD addressed all of these issues. Patient claiming that pain is still not controlled.

## 2024-08-25 NOTE — PHARMACY-VANCOMYCIN DOSING SERVICE
"Pharmacy Vancomycin Initial Note  Date of Service 2024  Patient's  1990  34 year old, female    Indication: Bacteremia and MRSA    Current estimated CrCl = Estimated Creatinine Clearance: 119.1 mL/min (based on SCr of 0.7 mg/dL).    Creatinine for last 3 days  2024: 10:09 AM Creatinine 0.68 mg/dL  2024:  6:00 AM Creatinine 0.70 mg/dL    Recent Vancomycin Level(s) for last 3 days  No results found for requested labs within last 3 days.      Vancomycin IV Administrations (past 72 hours)        No vancomycin orders with administrations in past 72 hours.                    Nephrotoxins and other renal medications (From now, onward)      Start     Dose/Rate Route Frequency Ordered Stop    24 2200  vancomycin (VANCOCIN) 1,250 mg in sodium chloride 0.9 % 250 mL intermittent infusion        Placed in \"Followed by\" Linked Group    1,250 mg  over 90 Minutes Intravenous EVERY 12 HOURS 24 0949      24 1000  vancomycin (VANCOCIN) 1,500 mg in sodium chloride 0.9 % 250 mL intermittent infusion        Placed in \"Followed by\" Linked Group    1,500 mg  over 90 Minutes Intravenous ONCE 24 0949      24 2100  piperacillin-tazobactam (ZOSYN) 3.375 g vial to attach to  mL bag        Note to Pharmacy: For [facility], [facility] and [facility]: For Zosyn-naive patients, use the \"Zosyn initial dose + extended infusion\" order panel.    3.375 g  over 240 Minutes Intravenous EVERY 8 HOURS 24              Contrast Orders - past 72 hours (72h ago, onward)      Start     Dose/Rate Route Frequency Stop    24 1200  gadobutrol (GADAVIST) injection 7 mL         7 mL Intravenous ONCE 24 1147            [place] Prediction of Planned Initial Vancomycin Regimen  Loading dose: 1500 mg at 10:00 2024.  Regimen: 1250 mg IV every 12 hours.  Start time: 22:00 on 2024  Exposure target: AUC24 (range)400-600 mg/L.hr   AUC24,ss: 512 mg/L.hr  Probability of AUC24 > 400: 74 " %  Ctrough,ss: 14.9 mg/L  Probability of Ctrough,ss > 20: 29 %  Probability of nephrotoxicity (Lodise STALIN 2009): 10 %          Plan:  Start vancomycin  1500 mg IV once, followed by vancomycin 1250 mg IV Q12H  Vancomycin monitoring method: AUC  Vancomycin therapeutic monitoring goal: 400-600 mg*h/L  Pharmacy will check vancomycin levels as appropriate in 1-3 Days.    Serum creatinine levels will be ordered daily for the first week of therapy and at least twice weekly for subsequent weeks.      [facility]

## 2024-08-25 NOTE — CARE PLAN
Brief Orthopedic Note    MRI of left hand reviewed. MRI is somewhat limited due to patient tolerance. MRI shows fluid in the middle finger flexor sheath concerning for flexor tenosynovitis. No underlying osteomyelitis appreciated. MRI left shoulder was not completed. The patient has 4/4 Kanavel signs and MRI confirming fluid collection in the middle finger concerning for flexor tenosynovitis. Patient has recently been given a diet so plan for OR tomorrow for I&D left hand and aspiration left shoulder. NPO at midnight. Will reassess in the morning and discuss surgery in detail with the patient. Please contact me directly with questions or concerns.    Abhay Huynh MD  Hand and Upper Extremity Surgeon  [facility]

## 2024-08-25 NOTE — OR NURSING
"Pt arrived via wheelchair from short stay unit. Pt fully clothed underneath hospital gown. Instructed patient to remove all clothing and change into surgical gown. Pt crying. Attempted to help patient, and she screamed at this RN saying \"You're hurting me.\" I asked what was hurting, and she stated her shoulder. I attempted to move her left hand to get it through her tank top and she again screamed and jumped saying \"That finger is broken. You keep hurting me.\" Then she states \"I want my things. I want all of my other clothes and my things. I need to get dressed and get out of here.\" I explained that she is here for surgery and we are in the process of getting her ready for surgery. She  states \"I'm not having surgery. I don't want surgery. I want to go. I need to talk to my family.\" I offered her the use of the phone. She states \"I need all my things and I need to go. I don't even know what you're doing. I need to do research and get a second opinion. Why are you hurting me and being aggressive? You can't make me stay here and have surgery. Pt is adamant about returning to her inpatient room. Another RN is walking patient back to her short stay room, and states she is going to leave the hospital.     Dr. Huynh arrived on unit and has been made aware.   "

## 2024-08-25 NOTE — SIGNIFICANT EVENT
"Significant Event Note    Time of event: 4:15 PM August 25, 2024    Description of event:  Patient stating she wants to leave the hospital and get a second opinion elsewhere. Feels she isn't being treated well here and that no one has explained her procedure to her and doesn't know what's going on. I asked patient if she'd like me to try to help her understand, she did, so I attempted. She appreciated the information and said she understood now what to expect but still wants to go home. She said she would come back in the morning to do the procedure and I explained that it doesn't and can't work that way. She is still insistent on leaving. I told her that she has an infection and if she leaves she is at risk for her infection getting worse, spreading to her bloodstream, causing her to become septic and that she could die. She said \"This is bullshit. I'm leaving.\"    Plan:  AMA form signed by patient and myself    Discussed with: bedside nurse and supervising physician, Dr. Dillan Mcmahon NP    "

## 2024-08-25 NOTE — CONSULTS
Infectious Disease Consultation:  Requesting MD:  Reason for consult:      HISTORY:  Pt with non injection drug usage.  No prior hx endocarditis or blood infection she is aware of.  Acute point tenderness of the shoulder and hand.  Pain severe, all night, unable to sleep.  MRI done.  Will be going to OR today.  No rash.  No murmur on exam.  Crying, wants ativan and pain meds.        Pasted from ORTHO:  Lauren Santiago is a 34 year old female with history of schizophrenia, polysubstance abuse with recent relapse and recent gonorrhea diagnosis who presents for evaluation of left upper extremity pain including predominantly about the left shoulder and left hand.  She localizes most of the hand pain to the middle finger.  She does have a prior history of injury to the ring and small fingers that have resulted in flexion contracture at the ring and small finger MP joints.  The patient lives in a group home close by.  She states that she relapsed on Friday.  She was smoking meth and she thinks it was laced with fentanyl.  She fell asleep at a random person's house and was asleep for an unknown period of time.  She woke up on Saturday with severe pain in her left upper extremity.  She describes the pain is extending from her shoulder all the way down to her hand.  She describes this as a sharp shooting pain.  She has limited movement to her left shoulder and her left hand.  She denies any fevers or chills.  She denies any significant malaise.  She is fairly weak.         Pertinent past history, past infectious disease history:    Past Medical History       Past Medical History:   Diagnosis Date    Anxiety 6/16/2015    GERD (gastroesophageal reflux disease) 5/23/2015    Headache      Migraine headache 6/23/2015    Mood disorder (H24) 6/16/2015    Opiate abuse, episodic (H)       Stopped and detoxed.    Panic disorder 6/23/2015    PTSD (post-traumatic stress disorder) 2009               Past Surgical History  No past surgical  history on file.           Prior to Admission Medications  Prior to Admission Medications   Prescriptions Last Dose Informant Patient Reported? Taking?   OLANZapine (ZYPREXA) 5 MG tablet 8/23/2024 at PM   Yes Yes   Sig: Take 10 mg by mouth as needed (anxiety).   acetaminophen (TYLENOL) 325 MG tablet More than a month at prn   Yes Yes   Sig: Take 325-650 mg by mouth every 6 hours as needed for mild pain.   benztropine (COGENTIN) 0.5 MG tablet 8/24/2024 at AM   Yes Yes   Sig: Take 0.5 mg by mouth daily.   buprenorphine HCl-naloxone HCl (SUBOXONE) 8-2 MG per film 8/24/2024 at AM   Yes Yes   Sig: Place 1 Film under the tongue 2 times daily.   busPIRone (BUSPAR) 10 MG tablet 8/24/2024 at AM   Yes Yes   Sig: Take 10 mg by mouth 3 times daily.   gabapentin (NEURONTIN) 800 MG tablet 8/24/2024 at AM   Yes Yes   Sig: Take 800 mg by mouth 3 times daily.   hydrOXYzine HCl (ATARAX) 25 MG tablet 8/23/2024 at PM   Yes Yes   Sig: Take 25-75 mg by mouth 3 times daily as needed for anxiety.   melatonin 3 MG tablet 8/20/2024 at hs   Yes Yes   Sig: Take 6 mg by mouth nightly as needed for sleep.   metFORMIN (GLUCOPHAGE XR) 500 MG 24 hr tablet 8/24/2024 at AM   Yes Yes   Sig: Take 500 mg by mouth daily (with dinner).   naloxone (NARCAN) 4 MG/0.1ML nasal spray Unknown at prn   Yes Yes   Sig: Spray 4 mg into one nostril alternating nostrils as needed for opioid reversal. every 2-3 minutes until assistance arrives   paliperidone (INVEGA SUSTENNA) 156 MG/ML AUDREY injection 8/7/2024 at AM   Yes Yes   Sig: Inject 156 mg into the muscle every 28 days.   traZODone (DESYREL) 50 MG tablet 8/23/2024 at hs   Yes Yes   Sig: Take 50 mg by mouth at bedtime.      Facility-Administered Medications Last Administration Doses Remaining   cefTRIAXone (ROCEPHIN) in lidocaine 1% (PF) for IM administration only 500 mg None recorded 1               Review of Systems  The 10 point Review of Systems is negative other than noted in the HPI or here.           "  Social History  I have reviewed this patient's social history and updated it with pertinent information if needed.  Social History            Tobacco Use    Smoking status: Every Day       Current packs/day: 0.00       Average packs/day: 0.5 packs/day for 5.0 years (2.5 ttl pk-yrs)       Types: Cigarettes       Start date: 3/14/2023       Last attempt to quit: 2012       Years since quittin.6    Smokeless tobacco: Current    Tobacco comments:       e-cig   Substance Use Topics    Alcohol use: No    Drug use: Not Currently       Types: Benzodiazepines       Comment: Drug use: In treatment currently (2017); wants help geting off of Suboxone and Valium;                   Family History  I have reviewed this patient's family history and updated it with pertinent information if needed.        Family History   Problem Relation Age of Onset    Ovarian Cancer Mother           In remission as of     Substance Abuse Paternal Grandfather      Schizophrenia Brother      Substance Abuse Brother      Depression Brother      Mental Illness Brother      Depression Other                      Medications:  Reviewed prior to admission meds as applicable in chart review.  Current meds are reviewed in the EMR listed MAR.     ANTIBIOTICS:    Current:ancef, zosyn   Prior:CTX   Allergy to:    SH/FH and  travel history(if applicable to consult):    REVIEW OF SYSTEMS:  All other systems negative    EXAMINATION:  /72 (BP Location: Right arm)   Pulse (!) 123   Temp 99.2  F (37.3  C) (Oral)   Resp 20   Ht 1.702 m (5' 7\")   Wt 74 kg (163 lb 2.3 oz)   LMP 2023   SpO2 93%   Breastfeeding Unknown   BMI 25.55 kg/m    Alert, awake  Vitals tabulated above, reviewed  HEENT:tearful.    Neck supple without lymphadenopathy  Sclera clear  CARDIOVASCULAR regular rate and rhythm, no murmur  Lungs CLEAR TO AUSCULTATION   Abdomen soft, NT/ND, absent HEPATOSPLENOMEGALY  Skin normal many tattoo  Joints unable to examine " "really the L shoulder or hand.  Neurologic exam non focal  Wound:  NA        CLINICAL DATABASE FOR---LAB/MICRO/CULTURES/IMAGING STUDIES:  Lab Results   Component Value Date    WBC 12.4 08/25/2024     Lab Results   Component Value Date    RBC 4.06 08/25/2024     Lab Results   Component Value Date    HGB 12.0 08/25/2024     Lab Results   Component Value Date    HCT 34.9 08/25/2024     No components found for: \"MCT\"  Lab Results   Component Value Date    MCV 86 08/25/2024     Lab Results   Component Value Date    MCH 29.6 08/25/2024     Lab Results   Component Value Date    MCHC 34.4 08/25/2024     Lab Results   Component Value Date    RDW 13.1 08/25/2024     Lab Results   Component Value Date     08/25/2024       Last Comprehensive Metabolic Panel:  Sodium   Date Value Ref Range Status   08/25/2024 139 135 - 145 mmol/L Final     Potassium   Date Value Ref Range Status   08/25/2024 3.7 3.4 - 5.3 mmol/L Final   09/01/2021 3.9 3.5 - 5.0 mmol/L Final     Chloride   Date Value Ref Range Status   08/25/2024 100 98 - 107 mmol/L Final   09/01/2021 102 98 - 107 mmol/L Final     Carbon Dioxide (CO2)   Date Value Ref Range Status   08/25/2024 30 (H) 22 - 29 mmol/L Final   09/01/2021 29 22 - 31 mmol/L Final     Anion Gap   Date Value Ref Range Status   08/25/2024 9 7 - 15 mmol/L Final   09/01/2021 10 5 - 18 mmol/L Final     Glucose   Date Value Ref Range Status   08/25/2024 129 (H) 70 - 99 mg/dL Final   09/01/2021 96 70 - 125 mg/dL Final     GLUCOSE BY METER POCT   Date Value Ref Range Status   08/25/2024 151 (H) 70 - 99 mg/dL Final     Urea Nitrogen   Date Value Ref Range Status   08/25/2024 8.2 6.0 - 20.0 mg/dL Final   09/01/2021 10 8 - 22 mg/dL Final     Creatinine   Date Value Ref Range Status   08/25/2024 0.70 0.51 - 0.95 mg/dL Final     GFR Estimate   Date Value Ref Range Status   08/25/2024 >90 >60 mL/min/1.73m2 Final     Comment:     eGFR calculated using 2021 CKD-EPI equation.   01/07/2021 >60 >60 mL/min/1.73m2 " Final     Calcium   Date Value Ref Range Status   08/25/2024 8.9 8.8 - 10.4 mg/dL Final     Comment:     Reference intervals for this test were updated on 7/16/2024 to reflect our healthy population more accurately. There may be differences in the flagging of prior results with similar values performed with this method. Those prior results can be interpreted in the context of the updated reference intervals.       Liver Function Studies -   Recent Labs   Lab Test 03/11/24  2213   PROTTOTAL 6.4   ALBUMIN 4.0   BILITOTAL 0.6   ALKPHOS 64   AST 22   ALT 15       Erythrocyte Sedimentation Rate   Date Value Ref Range Status   08/24/2024 25 (H) 0 - 20 mm/hr Final     Vaginal GC swab + aug 21      MRI:                                                                       IMPRESSION:      Exam is limited as the patient was unable to straighten her fingers for the exam, which results in portions of the second, third and fourth fingers extending beyond the field-of-view. The patient terminated the exam early before additional sequences   could be obtained to cover these areas.      There does appear to be tenosynovitis involving the third flexor tendon at the level of the phalanges and extending proximally to the level of the mid third metacarpal. This is nonspecific but could be infectious. There is some soft tissue edema over the   third finger and over the volar aspect of the distal palm at the level of the second and third metacarpals suspicious for cellulitis. No drainable soft tissue collection is identified. No definite evidence for osteomyelitis within the visualized bones.             IMPRESSION:  Acute L hand, shoulder pain after unclear situation during drug usage  GC + aug 2--500mg IM given ED see note of Nacho INCOLE  Very elevated CRP    PLAN:  Vanco, zosyn  Single BC done, pending no stigmata on exam for endocarditis  Ortho to take to OR  follow      DARWIN MAKI MD  [facility]  Office [phone]

## 2024-08-25 NOTE — PROGRESS NOTES
Orthopaedic Surgery Progress Note 08/25/2024    S: No acute events overnight.  Pain not controlled on current regimen. She has ongoing pain in the finger and shoulder. No change from yesterday despite IV antibiotics.    O:  Temp: 99.2  F (37.3  C) Temp src: Oral BP: 119/72 Pulse: (!) 123   Resp: 20 SpO2: 93 % O2 Device: None (Room air)      Exam:  Gen: No acute distress, resting comfortably in bed.  Resp: Non-labored breathing  MSK:  Left Upper Extremity:   - No gross deformity, skin intact. Flexion contracture of the left small and ring fingers at the MP joints with 90 deg contracture, healed wound volar hand  - Significant tenderness to palpation over the left shoulder including the anterior, lateral and posterior aspect of the shoulder.  Significant tenderness palpation over the index and middle fingers.  - Middle finger has mild diffuse swelling, no erythema.  She holds this in a flexed position.  She does not tolerate passive extension of the finger and has very limited active flexion.  She has tenderness along the flexor tendon sheath to the level of the distal palmar crease.  -  index finger with very limited active flexion, tenderness palpation over the flexor sheath.  - Shoulder active range of motion 45 degrees of external rotation, limited forward flexion, 80 degrees abduction.  Passive range of motion 30 degrees forward flexion, 90 degrees abduction, 6 degrees of external rotation with pain  - No significant tenderness to palpation over sternoclavicular joint, clavicle, elbow, forearm  - Fires FPL, EPL, FDI, intrinsics  - SILT median, ulnar, radial, LABC axillary nerve distributions.  - Radial pulse palpable, fingers warm and well perfused.    Recent Labs   Lab 08/25/24  0600 08/24/24  1009   WBC 12.4* 15.0*   HGB 12.0 12.3    191   CRPI 134.90* 155.00*   SED  --  25*       Assessment: Lauren Santiago is a 34 year old female with past medical history significant for schizophrenia, gonorrhea  infection, polysubstance abuse including meth and fentanyl with recent relapse who presents with severe left upper extremity pain and concern for flexor tenosynovitis of the left middle finger and possible left septic shoulder arthritis. I recommend MRI left shoulder.    The patient has 4/4 Kanavel signs and MRI confirming fluid collection in the middle finger concerning for flexor tenosynovitis. We discussed risks and benefits of surgery including but not limited to pain, infection, wound problems, incomplete symptom relief, nerve injury, bleeding, tendon injury, need for further surgery in the future, as well as the risks of anesthesia among others. Patient elected to proceed with surgery. Left upper extremity was marked. I recommend full inpatient admission and pain medicine/addiction medicine consult.    Plan:  - Admit to Medicine.  - Plan for OR: Left hand irrigation and debridement, left shoulder aspiration   - Preop labs completed   - Surgical site marked   - Consent pending   - Medicine clearance  - Anticoagulation/DVT Prophylaxis: Recommend SCDs while in bed  - Antibiotics/Tetanus: Broad spectrum IV antibiotics per primary  - X-rays/Imaging: MRI left shoulder  - Activity: up with assist  - Weight bearing: NWCHERELLE LUNATHALIE  - Pain control: Multimodal pain regimen per primary.  - Diet: NPO  - Follow-up: TBD   - Disposition: TBD      Abhay Huynh MD  Hand and Upper Extremity Surgeon  [facility]

## 2024-08-25 NOTE — PLAN OF CARE
Patient arrived back on the unit after refusing to have surgical procedure.  Demanding from her nurse that she wants all of her things and is going to leave.  Writer sent a message to Dr. Grimaldo to update and have her come and speak to the patient.  Dr. Grimaldo in the middle of an admit and writer reached out to Suzette JUDD who will be coming to the floor to speak with the patient.

## 2024-08-25 NOTE — INTERVAL H&P NOTE
"I have reviewed the surgical (or preoperative) H&P that is linked to this encounter, and examined the patient. There are no significant changes    Clinical Conditions Present on Arrival:  Clinically Significant Risk Factors Present on Admission                      # Overweight: Estimated body mass index is 25.55 kg/m  as calculated from the following:    Height as of this encounter: 1.702 m (5' 7\").    Weight as of this encounter: 74 kg (163 lb 2.3 oz).       "

## 2024-08-25 NOTE — PROGRESS NOTES
Patient's cell phone was found by housekeeping staff and returned to the Security Desk at the ED admissions office in a white plastic bag.  on duty was given patient's name in the event she returns looking for it.

## 2024-08-25 NOTE — PROGRESS NOTES
Patient has declined her I & D. The procedure was explained to her several times by this author and the surgeon himself. Patient did not received all of her Zosyn antibiotics due to back and forth going down to PACU and then returning to Short Stay and wishing to discharge. Patient was informed she has MRSA.     Patient has chosen to leave AMA. IV was removed. All patient belongings were returned to her which a partial 12 pack of Mountain Dew regular , a full 12 pack of Mountain Dew Red, a partial bag of Jv's pieces, her e cigarette device, and a box of Swiss Rolls. Patient left in her street clothes. Patient left a 12 pack of Mountain Dew Red in the room which was thrown away. Patient's group home phoned to say that she could not return to their facility if she did not have the procedure. Patient left Short Stay 8 at 1640.

## 2024-08-26 RX ORDER — DOXYCYCLINE 100 MG/1
100 CAPSULE ORAL 2 TIMES DAILY
Qty: 14 CAPSULE | Refills: 0 | Status: SHIPPED | OUTPATIENT
Start: 2024-08-26 | End: 2024-09-02

## 2024-08-26 RX ORDER — CEFDINIR 300 MG/1
300 CAPSULE ORAL 2 TIMES DAILY
Qty: 14 CAPSULE | Refills: 0 | Status: SHIPPED | OUTPATIENT
Start: 2024-08-26 | End: 2024-09-02

## 2024-08-26 NOTE — DISCHARGE SUMMARY
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    Weight as of this encounter: 74 kg (163 lb 2.3 oz).       Follow-ups Needed After Discharge       Unresulted Labs Ordered in the Past 30 Days of this Admission       Date and Time Order Name Status Description    8/25/2024  5:58 AM Bld morphology pathology review In process     8/24/2024  1:24 PM Blood Culture Arm, Right Preliminary             Discharge Disposition     Condition at discharge: Serious    Hospital Course   Lauern Santiago is a 34 year old female admitted on 8/24/2024 with a history of schizophrenia polysubstance abuse and anxiety presenting with left upper limb pain.   Currently on suboxone with recent psychiatric hospitalization for meth induced psychosis.  MRI of hand show fluid in left middle finger concerning for flexor tenosynovitis.  Admitted and given IV abx, IM rocephin for recent gonorrhea infection, ID and ortho consults. I and D recommended and while enroute to OR she demanded discharge.  Despite discussions with [facility] about risks including death she demanded discharge.  Will send oral abx as recommended by ortho to pharmacy and highly recommended she stay in the hospital as well as return for ongoing care.  Her group home was aware of her decision to leave [facility] as well.      Doxycycline and cefdinir ordered for 7 " days to her pharmacy and message left on phone as she left before allowing this to be done       Consultations This Hospital Stay   INFECTIOUS DISEASES IP CONSULT  PHARMACY TO DOSE VANCO  CARE MANAGEMENT / SOCIAL WORK IP CONSULT  PAIN MANAGEMENT ADULT IP CONSULT    Code Status   Prior    Time Spent on this Encounter   I, Elke Grimaldo MD, personally saw the patient today and spent greater than 30 minutes discharging this patient.       Elke Grimaldo MD  [facility] EXTENDED RECOVERY AND SHORT STAY  [address] [place] [id number]  Phone: [phone]  Fax: [phone]  ______________________________________________________________________    Physical Exam   Vital Signs:                    Weight: 163 lbs 2.25 oz  See note from earlier today        Primary Care Physician   Amee Campa    Discharge Orders   No discharge procedures on file.    Significant Results and Procedures       Discharge Medications   Discharge Medication List as of 8/25/2024  5:15 PM        CONTINUE these medications which have NOT CHANGED    Details   acetaminophen (TYLENOL) 325 MG tablet Take 325-650 mg by mouth every 6 hours as needed for mild pain., Historical      benztropine (COGENTIN) 0.5 MG tablet Take 0.5 mg by mouth daily., Historical      buprenorphine HCl-naloxone HCl (SUBOXONE) 8-2 MG per film Place 1 Film under the tongue 2 times daily., Historical      busPIRone (BUSPAR) 10 MG tablet Take 10 mg by mouth 3 times daily., Historical      gabapentin (NEURONTIN) 800 MG tablet Take 800 mg by mouth 3 times daily., Historical      hydrOXYzine HCl (ATARAX) 25 MG tablet Take 25-75 mg by mouth 3 times daily as needed for anxiety., Historical      melatonin 3 MG tablet Take 6 mg by mouth nightly as needed for sleep., Historical      metFORMIN (GLUCOPHAGE XR) 500 MG 24 hr tablet Take 500 mg by mouth daily (with dinner)., Historical      naloxone (NARCAN) 4 MG/0.1ML nasal spray Spray 4 mg into one nostril  alternating nostrils as needed for opioid reversal. every 2-3 minutes until assistance arrives, Historical      OLANZapine (ZYPREXA) 5 MG tablet Take 10 mg by mouth as needed (anxiety)., Historical      paliperidone (INVEGA SUSTENNA) 156 MG/ML AUDREY injection Inject 156 mg into the muscle every 28 days., Historical      traZODone (DESYREL) 50 MG tablet Take 50 mg by mouth at bedtime., Historical           Allergies   No Known Allergies

## 2024-08-26 NOTE — PROGRESS NOTES
Surgery Cancelled Note    I saw and evaluated the patient this morning at 0900. I reviewed the MRI results of her left hand, lab workup, physical exam findings and diagnosis of likely flexor tenosynovitis of the left middle finger. We discussed the diagnosis and treatment options at length as outlined in my prior note. I recommended surgical management with I&D of the left hand. The patient verbalized understanding and agreement. She was scheduled and ready for the OR. Upon arriving to the PACU, the patient refused surgery and demanded to leave the hospital. I saw the patient in her short stay room shortly after she was returned from the PACU. She states that she would like a second opinion and wants to leave the hospital. I did review her left shoulder MRI with her which shows no glenohumeral effusion, which is a good sign that she does not likely have septic shoulder arthritis. It does demonstrate acute strain or partial intramuscular injury involving the distal insertion of the trapezius muscle and origins of the deltoid muscle along the scapular spine, acromion, and distal clavicle attachments. I reiterated the diagnosis of likely infection in the flexor tendon sheath of her left middle finger, and the recommendation for surgical debridement to clear the infection. She adamantly refused. I discussed the risks of untreated flexor tenosynovitis including worsening infection in the left upper extremity, sepsis, or even death. We discussed that if the infection spreads, she may require more aggressive surgery such as larger debridement or even amputation. The patient verbalized understanding and reiterates that she wants to leave. I recommend oral antibiotics at minimum given her known infections. She was provided her belongings and signed AMA forms.      Abhay Huynh MD  Hand and Upper Extremity Surgeon  [facility]

## 2024-08-27 LAB
PATH REPORT.COMMENTS IMP SPEC: NORMAL
PATH REPORT.COMMENTS IMP SPEC: NORMAL
PATH REPORT.FINAL DX SPEC: NORMAL
PATH REPORT.MICROSCOPIC SPEC OTHER STN: NORMAL

## 2024-08-30 LAB — BACTERIA BLD CULT: NO GROWTH

## 2024-09-19 ENCOUNTER — TELEPHONE (OUTPATIENT)
Dept: PHARMACY | Facility: OTHER | Age: 34
End: 2024-09-19
Payer: COMMERCIAL

## 2024-09-19 NOTE — TELEPHONE ENCOUNTER
[facility] Recruitment: [facility]     Referral outreach attempt #1 on September 19, 2024      Outcome: left voicemail- Call back number [phone]    Mone Candelario  [facility]

## 2024-09-23 ENCOUNTER — TELEPHONE (OUTPATIENT)
Dept: PHARMACY | Facility: OTHER | Age: 34
End: 2024-09-23
Payer: COMMERCIAL

## 2024-09-23 NOTE — TELEPHONE ENCOUNTER
[facility] Recruitment: [facility] insurance     Referral outreach attempt #2 on September 23, 2024      Outcome: left voicemail- Call back number [phone] and MyCHashplext message sent    [facility]

## 2024-09-29 ENCOUNTER — HEALTH MAINTENANCE LETTER (OUTPATIENT)
Age: 34
End: 2024-09-29

## 2024-10-01 ENCOUNTER — OFFICE VISIT (OUTPATIENT)
Dept: FAMILY MEDICINE | Facility: CLINIC | Age: 34
End: 2024-10-01
Payer: COMMERCIAL

## 2024-10-01 VITALS
HEIGHT: 67 IN | DIASTOLIC BLOOD PRESSURE: 70 MMHG | SYSTOLIC BLOOD PRESSURE: 110 MMHG | RESPIRATION RATE: 16 BRPM | BODY MASS INDEX: 26.79 KG/M2 | TEMPERATURE: 98.4 F | HEART RATE: 101 BPM | OXYGEN SATURATION: 97 % | WEIGHT: 170.7 LBS

## 2024-10-01 DIAGNOSIS — F41.1 GAD (GENERALIZED ANXIETY DISORDER): ICD-10-CM

## 2024-10-01 DIAGNOSIS — F19.90 SUBSTANCE USE DISORDER: Primary | ICD-10-CM

## 2024-10-01 DIAGNOSIS — K13.79 MOUTH PAIN: ICD-10-CM

## 2024-10-01 DIAGNOSIS — Z30.09 COUNSELING FOR BIRTH CONTROL, ORAL CONTRACEPTIVES: ICD-10-CM

## 2024-10-01 DIAGNOSIS — F20.3: ICD-10-CM

## 2024-10-01 DIAGNOSIS — F33.1 MODERATE EPISODE OF RECURRENT MAJOR DEPRESSIVE DISORDER (H): ICD-10-CM

## 2024-10-01 PROBLEM — F43.10 PTSD (POST-TRAUMATIC STRESS DISORDER): Status: ACTIVE | Noted: 2024-03-31

## 2024-10-01 PROBLEM — M65.949 FLEXOR TENOSYNOVITIS OF FINGER: Status: ACTIVE | Noted: 2024-08-26

## 2024-10-01 PROBLEM — A41.9 SEPSIS (H): Status: ACTIVE | Noted: 2024-03-30

## 2024-10-01 PROBLEM — A41.9 SEPSIS (H): Status: RESOLVED | Noted: 2024-03-30 | Resolved: 2024-10-01

## 2024-10-01 PROBLEM — F15.959 METHAMPHETAMINE-INDUCED PSYCHOTIC DISORDER (H): Status: ACTIVE | Noted: 2024-05-18

## 2024-10-01 PROBLEM — L03.90 CELLULITIS: Status: ACTIVE | Noted: 2024-03-30

## 2024-10-01 PROBLEM — F41.0 PANIC DISORDER: Status: ACTIVE | Noted: 2024-03-31

## 2024-10-01 PROBLEM — M65.949 FLEXOR TENOSYNOVITIS OF FINGER: Status: RESOLVED | Noted: 2024-08-26 | Resolved: 2024-10-01

## 2024-10-01 PROCEDURE — 99214 OFFICE O/P EST MOD 30 MIN: CPT

## 2024-10-01 RX ORDER — NORETHINDRONE ACETATE AND ETHINYL ESTRADIOL 1MG-20(21)
1 KIT ORAL DAILY
Qty: 28 TABLET | Refills: 11 | Status: SHIPPED | OUTPATIENT
Start: 2024-10-01 | End: 2024-10-29

## 2024-10-01 RX ORDER — ESCITALOPRAM OXALATE 10 MG/1
10 TABLET ORAL DAILY
COMMUNITY

## 2024-10-01 RX ORDER — BUPRENORPHINE AND NALOXONE 8; 2 MG/1; MG/1
1 FILM, SOLUBLE BUCCAL; SUBLINGUAL 2 TIMES DAILY
Qty: 60 FILM | Refills: 0 | Status: SHIPPED | OUTPATIENT
Start: 2024-10-01 | End: 2024-10-31

## 2024-10-01 RX ORDER — CLINDAMYCIN HCL 150 MG
450 CAPSULE ORAL 3 TIMES DAILY
Qty: 90 CAPSULE | Refills: 0 | Status: SHIPPED | OUTPATIENT
Start: 2024-10-01 | End: 2024-10-11

## 2024-10-01 ASSESSMENT — PATIENT HEALTH QUESTIONNAIRE - PHQ9
SUM OF ALL RESPONSES TO PHQ QUESTIONS 1-9: 13
10. IF YOU CHECKED OFF ANY PROBLEMS, HOW DIFFICULT HAVE THESE PROBLEMS MADE IT FOR YOU TO DO YOUR WORK, TAKE CARE OF THINGS AT HOME, OR GET ALONG WITH OTHER PEOPLE: VERY DIFFICULT
SUM OF ALL RESPONSES TO PHQ QUESTIONS 1-9: 13

## 2024-10-01 NOTE — PROGRESS NOTES
Assessment & Plan     Substance use disorder  Mulu 34-year-old female wanting to resume Suboxone  Previous provider was making her be seen every week and this was not feasible  Doing well on current dose of 8-2 2 times per day films  Minimal cravings  If misses dose will have cold clammy feeling with restless legs and goosebumps  Motivating factors are her 2 kids ages 6 and 11  About to graduate from a group home and go to sober living which she is excited about   we will refill Suboxone and follow-up in 1 month    - buprenorphine HCl-naloxone HCl (SUBOXONE) 8-2 MG per film; Place 1 Film under the tongue 2 times daily.    Mouth pain  Approximately 3-week history of dental pain with a cracked crumbled tooth approximately 19  Made a dentist appointment but has not gone yet  Overnight a large lump developed on the buccal side  Will start clindamycin due to history of MRSA  - Dental Referral; Future  - clindamycin (CLEOCIN) 150 MG capsule; Take 3 capsules (450 mg) by mouth 3 times daily for 10 days.    Counseling for birth control, oral contraceptives  Patient reports that she is wanting to get pregnant again  She is currently sexually active using condoms with 1 male partner  She has 2 kids already and has had at least 1 miscarriage  She has not had her menstrual cycle in approximately 9 months  Discussed with the patient that we should get her more medically stable before we add a lidocaine she is agreeable to this  We are going to start her on oral birth control  She does not have any contraindications    - norethindrone-ethinyl estradiol (JUNEL FE 1/20) 1-20 MG-MCG tablet; Take 1 tablet by mouth daily for 28 days.    Moderate episode of recurrent major depressive disorder (H)  DAE (generalized anxiety disorder)  Schizophrenia  Follows with [facility] for her psychiatric medications including Invega, benztropine, Lexapro, Hydroxyzine, gabapentin    Mulu 34-year-old female that is working to be sober from drugs  "and alcohol  We will see her in 4 weeks to assess her Suboxone as well as do a Pap smear as she is overdue for this for a preventative visit        BMI  Estimated body mass index is 26.73 kg/m  as calculated from the following:    Height as of this encounter: 1.702 m (5' 7.01\").    Weight as of this encounter: 77.4 kg (170 lb 11.2 oz).     Subjective   Lauren is a 34 year old, presenting for the following health issues:  Medication Request (Assessment for suboxone )        10/1/2024    12:53 PM   Additional Questions   Roomed by kirti orourke   Accompanied by angela WALTERS       Opiod Use Disorder  Started Suboxone about 4-5 months ago. Was using fentanyl for short period  Started using pills in her 20s. Eventually used heroin  Had one time use of IVDU of meth and it started  Last meth use was 10 days ago - smoked meth  Stays at an [facility] but graduates soon and then going to sober home  Happy that she is doing well  Has had a rough year-Brother passed from fentanyl overdose and dad had a heart attack who passed  Motivating Factors: 2 children- 6 and 11 currently staying with dad  Current legal: R20 evaluation for mental health. Court in one month. Burgalry charge  On commitment with [facility] for invega injetions  Still gets impending doom  Concurrent: Meth made it worse but has symptoms without  Withdrawal Symptoms: Hot, cold clammy, headache, restless legs, goosepumps  -  Mouth Pain  Tooth that crumbled a few weeks ago  Has upcoming dental appointment   large lump  started today that is painful  .  LMP  Has not had period in the last 9 months  Would like to get pregnant again eventually  Currently sexually active with one male partner  Uses condoms  .      Opioid Use Disorder Criteria:        No data to display              PHQ Score:      7/7/2023     9:52 AM 9/27/2023    11:19 AM 10/1/2024    12:48 PM   PHQ   PHQ-9 Total Score 19 13 13   Q9: Thoughts of better off dead/self-harm past 2 weeks Not at all Not at all Not at all " "    GAD7 Score:       11/18/2020    10:00 AM 4/6/2022     1:17 PM 7/7/2023     9:56 AM   DAE-7 SCORE   Total Score  21 (severe anxiety) 21 (severe anxiety)   Total Score 21 21 21         PDMP Review       None                GENERAL: No fever, chills, weight loss or gain  HEENT: dental pain  CARDIAC: Denies chest pain or shortness of breath  LUNG: Denies dyspnea on exertion or chest pain  ABD: Denies pain, nausea, vomiting, diarrhea, constipation  : No changes in bladder habits  SKIN: Denies new rashes  NEURO: No numbness, weakness, tingling   MSK: No weakness  PSYCH: anxious        Objective    /70   Pulse 101   Temp 98.4  F (36.9  C) (Oral)   Resp 16   Ht 1.702 m (5' 7.01\")   Wt 77.4 kg (170 lb 11.2 oz)   LMP 08/04/2023   SpO2 97%   BMI 26.73 kg/m    Body mass index is 26.73 kg/m .  Physical Exam  Vitals reviewed.   Constitutional:       Appearance: Normal appearance.   HENT:      Head: Normocephalic and atraumatic.      Right Ear: Tympanic membrane, ear canal and external ear normal.      Left Ear: Tympanic membrane, ear canal and external ear normal.      Nose: Nose normal.      Mouth/Throat:      Comments: Multiple missing teeth that are cracked.  Missing tooth19.  Erythematous with slight purulence  Eyes:      Extraocular Movements: Extraocular movements intact.      Pupils: Pupils are equal, round, and reactive to light.   Cardiovascular:      Rate and Rhythm: Normal rate and regular rhythm.      Heart sounds: Normal heart sounds.   Pulmonary:      Effort: Pulmonary effort is normal.      Breath sounds: Normal breath sounds.   Abdominal:      General: Abdomen is flat. Bowel sounds are normal.      Palpations: Abdomen is soft.   Musculoskeletal:      Cervical back: Normal range of motion and neck supple.   Skin:     General: Skin is warm and dry.   Neurological:      General: No focal deficit present.      Mental Status: She is alert.   Psychiatric:         Mood and Affect: Mood normal.         " Behavior: Behavior normal.                Signed Electronically by: Conner Santiago DO    Answers submitted by the patient for this visit:  Patient Health Questionnaire (Submitted on 10/1/2024)  If you checked off any problems, how difficult have these problems made it for you to do your work, take care of things at home, or get along with other people?: Very difficult  PHQ9 TOTAL SCORE: 13

## 2024-11-11 DIAGNOSIS — F19.90 SUBSTANCE USE DISORDER: ICD-10-CM

## 2024-11-11 RX ORDER — BUPRENORPHINE AND NALOXONE 8; 2 MG/1; MG/1
1 FILM, SOLUBLE BUCCAL; SUBLINGUAL 2 TIMES DAILY
Qty: 60 FILM | Refills: 0 | Status: SHIPPED | OUTPATIENT
Start: 2024-11-11

## 2024-11-11 NOTE — TELEPHONE ENCOUNTER
Patient calling in to request refill on suboxone, noting she is completely out and aware she missed her last visit but requesting provider refill.    LOV 10/1/24  Substance use disorder  Pleasant 34-year-old female wanting to resume Suboxone  Previous provider was making her be seen every week and this was not feasible  Doing well on current dose of 8-2 2 times per day films  Minimal cravings  If misses dose will have cold clammy feeling with restless legs and goosebumps  Motivating factors are her 2 kids ages 6 and 11  About to graduate from a group home and go to sober living which she is excited about   we will refill Suboxone and follow-up in 1 month     - buprenorphine HCl-naloxone HCl (SUBOXONE) 8-2 MG per film; Place 1 Film under the tongue 2 times daily.

## (undated) DEVICE — BNDG ELASTIC 3"X5YDS UNSTERILE 6611-30

## (undated) DEVICE — SUCTION MANIFOLD NEPTUNE 2 SYS 1 PORT 702-025-000

## (undated) DEVICE — SU ETHILON 5-0 P-3 18" BLACK 698G

## (undated) DEVICE — DRSG GAUZE 4X4" 3033

## (undated) DEVICE — SOL NACL 0.9% IRRIG 1000ML BOTTLE 2F7124

## (undated) DEVICE — SYR 10ML LL W/O NDL 302995

## (undated) DEVICE — DRSG KERLIX FLUFFS X5

## (undated) DEVICE — SOL WATER IRRIG 1000ML BOTTLE 2F7114

## (undated) DEVICE — DRSG KERLIX 4 1/2"X4YDS ROLL 6715

## (undated) DEVICE — GOWN SRG XL LVL 3 NONREINFORCE SET IN SLV STRL LF 9545

## (undated) DEVICE — SOLUTION IRRIG 2B7127 .9NS 3000ML BAG

## (undated) DEVICE — PREP CHLORAPREP 26ML TINTED HI-LITE ORANGE 930815

## (undated) DEVICE — DRAPE STOCKINETTE 4" 8544

## (undated) DEVICE — ESU CORD BIPOLAR 12' E0512

## (undated) DEVICE — ESU GROUND PAD ADULT REM W/15' CORD E7507DB

## (undated) DEVICE — TUBING IRRIG TUR Y TYPE 96" LF 6543-01

## (undated) DEVICE — CUSTOM PACK UPPER EXTREMITY SOP5BUEHEC

## (undated) DEVICE — Device

## (undated) DEVICE — GLOVE BIOGEL PI ULTRATOUCH G SZ 7.5 42175